# Patient Record
Sex: FEMALE | Race: WHITE | NOT HISPANIC OR LATINO | Employment: OTHER | URBAN - METROPOLITAN AREA
[De-identification: names, ages, dates, MRNs, and addresses within clinical notes are randomized per-mention and may not be internally consistent; named-entity substitution may affect disease eponyms.]

---

## 2017-01-06 ENCOUNTER — TRANSCRIBE ORDERS (OUTPATIENT)
Dept: ADMINISTRATIVE | Facility: HOSPITAL | Age: 77
End: 2017-01-06

## 2017-01-06 DIAGNOSIS — E04.1 NONTOXIC UNINODULAR GOITER: Primary | ICD-10-CM

## 2017-01-09 ENCOUNTER — ALLSCRIPTS OFFICE VISIT (OUTPATIENT)
Dept: OTHER | Facility: OTHER | Age: 77
End: 2017-01-09

## 2017-01-16 ENCOUNTER — ALLSCRIPTS OFFICE VISIT (OUTPATIENT)
Dept: OTHER | Facility: OTHER | Age: 77
End: 2017-01-16

## 2017-01-20 ENCOUNTER — ALLSCRIPTS OFFICE VISIT (OUTPATIENT)
Dept: OTHER | Facility: OTHER | Age: 77
End: 2017-01-20

## 2017-01-26 ENCOUNTER — HOSPITAL ENCOUNTER (OUTPATIENT)
Dept: RADIOLOGY | Facility: HOSPITAL | Age: 77
Discharge: HOME | End: 2017-01-26
Attending: SURGERY
Payer: MEDICARE

## 2017-01-26 DIAGNOSIS — E04.1 NONTOXIC UNINODULAR GOITER: ICD-10-CM

## 2017-01-26 PROCEDURE — 76536 US EXAM OF HEAD AND NECK: CPT

## 2017-02-14 ENCOUNTER — ALLSCRIPTS OFFICE VISIT (OUTPATIENT)
Dept: OTHER | Facility: OTHER | Age: 77
End: 2017-02-14

## 2017-04-10 ENCOUNTER — ALLSCRIPTS OFFICE VISIT (OUTPATIENT)
Dept: OTHER | Facility: OTHER | Age: 77
End: 2017-04-10

## 2017-05-15 ENCOUNTER — APPOINTMENT (EMERGENCY)
Dept: RADIOLOGY | Facility: HOSPITAL | Age: 77
End: 2017-05-15
Payer: MEDICARE

## 2017-05-15 ENCOUNTER — HOSPITAL ENCOUNTER (EMERGENCY)
Facility: HOSPITAL | Age: 77
Discharge: HOME | End: 2017-05-15
Attending: EMERGENCY MEDICINE | Admitting: EMERGENCY MEDICINE
Payer: MEDICARE

## 2017-05-15 VITALS
HEART RATE: 66 BPM | SYSTOLIC BLOOD PRESSURE: 169 MMHG | WEIGHT: 160 LBS | BODY MASS INDEX: 31.25 KG/M2 | OXYGEN SATURATION: 100 % | TEMPERATURE: 97.1 F | RESPIRATION RATE: 20 BRPM | DIASTOLIC BLOOD PRESSURE: 82 MMHG

## 2017-05-15 DIAGNOSIS — R10.9 ABDOMINAL PAIN: Primary | ICD-10-CM

## 2017-05-15 LAB
ALBUMIN SERPL BCP-MCNC: 3.8 G/DL (ref 3.5–5)
ALP SERPL-CCNC: 63 U/L (ref 46–116)
ALT SERPL W P-5'-P-CCNC: 19 U/L (ref 12–78)
ANION GAP SERPL CALCULATED.3IONS-SCNC: 11 MMOL/L (ref 4–13)
AST SERPL W P-5'-P-CCNC: 24 U/L (ref 5–45)
BASOPHILS # BLD AUTO: 0 THOUSANDS/ΜL (ref 0–0.1)
BASOPHILS NFR BLD AUTO: 1 % (ref 0–1)
BILIRUB SERPL-MCNC: 0.5 MG/DL (ref 0.2–1)
BILIRUB UR QL STRIP: NEGATIVE
BUN SERPL-MCNC: 12 MG/DL (ref 5–25)
CALCIUM SERPL-MCNC: 9.7 MG/DL (ref 8.3–10.1)
CHLORIDE SERPL-SCNC: 103 MMOL/L (ref 100–108)
CLARITY UR: CLEAR
CO2 SERPL-SCNC: 27 MMOL/L (ref 21–32)
COLOR UR: YELLOW
CREAT SERPL-MCNC: 0.71 MG/DL (ref 0.6–1.3)
EOSINOPHIL # BLD AUTO: 0.4 THOUSAND/ΜL (ref 0–0.61)
EOSINOPHIL NFR BLD AUTO: 8 % (ref 0–6)
ERYTHROCYTE [DISTWIDTH] IN BLOOD BY AUTOMATED COUNT: 16 % (ref 11.6–15.1)
EST. AVERAGE GLUCOSE BLD GHB EST-MCNC: 111 MG/DL
GFR SERPL CREATININE-BSD FRML MDRD: >60 ML/MIN/1.73SQ M
GLUCOSE SERPL-MCNC: 97 MG/DL (ref 65–140)
GLUCOSE UR STRIP-MCNC: NEGATIVE MG/DL
HBA1C MFR BLD: 5.5 % (ref 4.2–6.3)
HCT VFR BLD AUTO: 31.6 % (ref 37–47)
HGB BLD-MCNC: 10.2 G/DL (ref 12–16)
HGB UR QL STRIP.AUTO: NEGATIVE
KETONES UR STRIP-MCNC: NEGATIVE MG/DL
LACTATE SERPL-SCNC: 1 MMOL/L (ref 0.5–2)
LEUKOCYTE ESTERASE UR QL STRIP: NEGATIVE
LIPASE SERPL-CCNC: 65 U/L (ref 73–393)
LYMPHOCYTES # BLD AUTO: 0.7 THOUSANDS/ΜL (ref 0.6–4.47)
LYMPHOCYTES NFR BLD AUTO: 15 % (ref 14–44)
MCH RBC QN AUTO: 26.5 PG (ref 27–31)
MCHC RBC AUTO-ENTMCNC: 32.2 G/DL (ref 31.4–37.4)
MCV RBC AUTO: 82 FL (ref 82–98)
MONOCYTES # BLD AUTO: 0.6 THOUSAND/ΜL (ref 0.17–1.22)
MONOCYTES NFR BLD AUTO: 13 % (ref 4–12)
NEUTROPHILS # BLD AUTO: 3 THOUSANDS/ΜL (ref 1.85–7.62)
NEUTS SEG NFR BLD AUTO: 63 % (ref 43–75)
NITRITE UR QL STRIP: NEGATIVE
NRBC BLD AUTO-RTO: 0 /100 WBCS
PH UR STRIP.AUTO: 6.5 [PH] (ref 5–9)
PLATELET # BLD AUTO: 232 THOUSANDS/UL (ref 130–400)
PMV BLD AUTO: 7.9 FL (ref 8.9–12.7)
POTASSIUM SERPL-SCNC: 4.2 MMOL/L (ref 3.5–5.3)
PROT SERPL-MCNC: 7.5 G/DL (ref 6.4–8.2)
PROT UR STRIP-MCNC: NEGATIVE MG/DL
RBC # BLD AUTO: 3.84 MILLION/UL (ref 4.2–5.4)
SODIUM SERPL-SCNC: 141 MMOL/L (ref 136–145)
SP GR UR STRIP.AUTO: 1.01 (ref 1–1.03)
T3 SERPL-MCNC: 1.1 NG/ML (ref 0.6–1.8)
T4 FREE SERPL-MCNC: 1.17 NG/DL (ref 0.76–1.46)
TSH SERPL DL<=0.05 MIU/L-ACNC: 2.11 UIU/ML (ref 0.36–3.74)
UROBILINOGEN UR QL STRIP.AUTO: 0.2 E.U./DL
WBC # BLD AUTO: 4.9 THOUSAND/UL (ref 4.8–10.8)

## 2017-05-15 PROCEDURE — 84439 ASSAY OF FREE THYROXINE: CPT | Performed by: EMERGENCY MEDICINE

## 2017-05-15 PROCEDURE — 83690 ASSAY OF LIPASE: CPT | Performed by: EMERGENCY MEDICINE

## 2017-05-15 PROCEDURE — 87040 BLOOD CULTURE FOR BACTERIA: CPT | Performed by: EMERGENCY MEDICINE

## 2017-05-15 PROCEDURE — 85025 COMPLETE CBC W/AUTO DIFF WBC: CPT | Performed by: EMERGENCY MEDICINE

## 2017-05-15 PROCEDURE — 84480 ASSAY TRIIODOTHYRONINE (T3): CPT | Performed by: EMERGENCY MEDICINE

## 2017-05-15 PROCEDURE — 74177 CT ABD & PELVIS W/CONTRAST: CPT

## 2017-05-15 PROCEDURE — 36415 COLL VENOUS BLD VENIPUNCTURE: CPT | Performed by: EMERGENCY MEDICINE

## 2017-05-15 PROCEDURE — 80053 COMPREHEN METABOLIC PANEL: CPT | Performed by: EMERGENCY MEDICINE

## 2017-05-15 PROCEDURE — 84443 ASSAY THYROID STIM HORMONE: CPT | Performed by: EMERGENCY MEDICINE

## 2017-05-15 PROCEDURE — 83036 HEMOGLOBIN GLYCOSYLATED A1C: CPT | Performed by: EMERGENCY MEDICINE

## 2017-05-15 PROCEDURE — 83605 ASSAY OF LACTIC ACID: CPT | Performed by: EMERGENCY MEDICINE

## 2017-05-15 PROCEDURE — 93005 ELECTROCARDIOGRAM TRACING: CPT | Performed by: EMERGENCY MEDICINE

## 2017-05-15 PROCEDURE — 81003 URINALYSIS AUTO W/O SCOPE: CPT | Performed by: EMERGENCY MEDICINE

## 2017-05-15 PROCEDURE — 99284 EMERGENCY DEPT VISIT MOD MDM: CPT

## 2017-05-15 RX ADMIN — IOHEXOL 100 ML: 350 INJECTION, SOLUTION INTRAVENOUS at 14:59

## 2017-05-16 LAB
ATRIAL RATE: 57 BPM
P AXIS: -1 DEGREES
PR INTERVAL: 168 MS
QRS AXIS: -5 DEGREES
QRSD INTERVAL: 158 MS
QT INTERVAL: 514 MS
QTC INTERVAL: 500 MS
T WAVE AXIS: 9 DEGREES
VENTRICULAR RATE: 57 BPM

## 2017-05-17 ENCOUNTER — ALLSCRIPTS OFFICE VISIT (OUTPATIENT)
Dept: OTHER | Facility: OTHER | Age: 77
End: 2017-05-17

## 2017-05-20 LAB
BACTERIA BLD CULT: NORMAL
BACTERIA BLD CULT: NORMAL

## 2017-05-22 ENCOUNTER — ALLSCRIPTS OFFICE VISIT (OUTPATIENT)
Dept: OTHER | Facility: OTHER | Age: 77
End: 2017-05-22

## 2017-06-14 ENCOUNTER — ALLSCRIPTS OFFICE VISIT (OUTPATIENT)
Dept: OTHER | Facility: OTHER | Age: 77
End: 2017-06-14

## 2017-06-14 DIAGNOSIS — E61.2 MAGNESIUM DEFICIENCY: ICD-10-CM

## 2017-06-14 DIAGNOSIS — E55.9 VITAMIN D DEFICIENCY: ICD-10-CM

## 2017-06-26 ENCOUNTER — TRANSCRIBE ORDERS (OUTPATIENT)
Dept: ADMINISTRATIVE | Facility: HOSPITAL | Age: 77
End: 2017-06-26

## 2017-06-26 ENCOUNTER — HOSPITAL ENCOUNTER (OUTPATIENT)
Dept: RADIOLOGY | Facility: HOSPITAL | Age: 77
Discharge: HOME | End: 2017-06-26
Attending: SURGERY
Payer: MEDICARE

## 2017-06-26 ENCOUNTER — APPOINTMENT (OUTPATIENT)
Dept: LAB | Facility: HOSPITAL | Age: 77
End: 2017-06-26
Attending: SURGERY
Payer: MEDICARE

## 2017-06-26 DIAGNOSIS — C50.912 MALIGNANT NEOPLASM OF LEFT FEMALE BREAST, UNSPECIFIED SITE OF BREAST: ICD-10-CM

## 2017-06-26 DIAGNOSIS — E55.9 UNSPECIFIED VITAMIN D DEFICIENCY: ICD-10-CM

## 2017-06-26 DIAGNOSIS — C50.912 MALIGNANT NEOPLASM OF LEFT FEMALE BREAST, UNSPECIFIED SITE OF BREAST: Primary | ICD-10-CM

## 2017-06-26 DIAGNOSIS — E61.2 MAGNESIUM DEFICIENCY: ICD-10-CM

## 2017-06-26 DIAGNOSIS — E61.2 MAGNESIUM DEFICIENCY: Primary | ICD-10-CM

## 2017-06-26 PROCEDURE — 71020 HB CHEST X-RAY 2VW FRONTAL&LATL: CPT

## 2017-06-26 PROCEDURE — 82306 VITAMIN D 25 HYDROXY: CPT | Performed by: FAMILY MEDICINE

## 2017-06-26 PROCEDURE — 86300 IMMUNOASSAY TUMOR CA 15-3: CPT

## 2017-06-26 PROCEDURE — 36415 COLL VENOUS BLD VENIPUNCTURE: CPT | Performed by: FAMILY MEDICINE

## 2017-06-26 PROCEDURE — 83735 ASSAY OF MAGNESIUM: CPT | Performed by: FAMILY MEDICINE

## 2017-06-27 LAB — CANCER AG27-29 SERPL-ACNC: 21.9 U/ML (ref 0–42.3)

## 2017-06-30 ENCOUNTER — ALLSCRIPTS OFFICE VISIT (OUTPATIENT)
Dept: OTHER | Facility: OTHER | Age: 77
End: 2017-06-30

## 2017-07-10 DIAGNOSIS — J30.9 ALLERGIC RHINITIS: ICD-10-CM

## 2017-07-10 DIAGNOSIS — E78.5 HYPERLIPIDEMIA: ICD-10-CM

## 2017-07-12 ENCOUNTER — ALLSCRIPTS OFFICE VISIT (OUTPATIENT)
Dept: OTHER | Facility: OTHER | Age: 77
End: 2017-07-12

## 2017-08-15 ENCOUNTER — GENERIC CONVERSION - ENCOUNTER (OUTPATIENT)
Dept: OTHER | Facility: OTHER | Age: 77
End: 2017-08-15

## 2017-08-15 ENCOUNTER — ALLSCRIPTS OFFICE VISIT (OUTPATIENT)
Dept: OTHER | Facility: OTHER | Age: 77
End: 2017-08-15

## 2017-08-15 DIAGNOSIS — E03.9 HYPOTHYROIDISM: ICD-10-CM

## 2017-08-15 DIAGNOSIS — E78.5 HYPERLIPIDEMIA: ICD-10-CM

## 2017-08-15 DIAGNOSIS — D64.9 ANEMIA: ICD-10-CM

## 2017-08-16 ENCOUNTER — APPOINTMENT (OUTPATIENT)
Dept: LAB | Facility: CLINIC | Age: 77
End: 2017-08-16
Payer: MEDICARE

## 2017-08-16 ENCOUNTER — TRANSCRIBE ORDERS (OUTPATIENT)
Dept: LAB | Facility: CLINIC | Age: 77
End: 2017-08-16

## 2017-08-16 DIAGNOSIS — E78.5 HYPERLIPIDEMIA: ICD-10-CM

## 2017-08-16 DIAGNOSIS — E03.9 HYPOTHYROIDISM: ICD-10-CM

## 2017-08-16 DIAGNOSIS — D64.9 ANEMIA: ICD-10-CM

## 2017-08-16 LAB
BASOPHILS # BLD AUTO: 0.02 THOUSANDS/ΜL (ref 0–0.1)
BASOPHILS NFR BLD AUTO: 0 % (ref 0–1)
CHOLEST SERPL-MCNC: 205 MG/DL (ref 50–200)
EOSINOPHIL # BLD AUTO: 0.14 THOUSAND/ΜL (ref 0–0.61)
EOSINOPHIL NFR BLD AUTO: 3 % (ref 0–6)
ERYTHROCYTE [DISTWIDTH] IN BLOOD BY AUTOMATED COUNT: 15.8 % (ref 11.6–15.1)
HCT VFR BLD AUTO: 32.1 % (ref 34.8–46.1)
HDLC SERPL-MCNC: 43 MG/DL (ref 40–60)
HGB BLD-MCNC: 10.2 G/DL (ref 11.5–15.4)
LDLC SERPL CALC-MCNC: 134 MG/DL (ref 0–100)
LYMPHOCYTES # BLD AUTO: 1.08 THOUSANDS/ΜL (ref 0.6–4.47)
LYMPHOCYTES NFR BLD AUTO: 21 % (ref 14–44)
MCH RBC QN AUTO: 26.1 PG (ref 26.8–34.3)
MCHC RBC AUTO-ENTMCNC: 31.8 G/DL (ref 31.4–37.4)
MCV RBC AUTO: 82 FL (ref 82–98)
MONOCYTES # BLD AUTO: 0.65 THOUSAND/ΜL (ref 0.17–1.22)
MONOCYTES NFR BLD AUTO: 13 % (ref 4–12)
NEUTROPHILS # BLD AUTO: 3.23 THOUSANDS/ΜL (ref 1.85–7.62)
NEUTS SEG NFR BLD AUTO: 63 % (ref 43–75)
NRBC BLD AUTO-RTO: 0 /100 WBCS
PLATELET # BLD AUTO: 243 THOUSANDS/UL (ref 149–390)
PMV BLD AUTO: 11.1 FL (ref 8.9–12.7)
RBC # BLD AUTO: 3.91 MILLION/UL (ref 3.81–5.12)
TRIGL SERPL-MCNC: 141 MG/DL
TSH SERPL DL<=0.05 MIU/L-ACNC: 1.52 UIU/ML (ref 0.36–3.74)
WBC # BLD AUTO: 5.13 THOUSAND/UL (ref 4.31–10.16)

## 2017-08-16 PROCEDURE — 80061 LIPID PANEL: CPT

## 2017-08-16 PROCEDURE — 36415 COLL VENOUS BLD VENIPUNCTURE: CPT

## 2017-08-16 PROCEDURE — 84443 ASSAY THYROID STIM HORMONE: CPT

## 2017-08-16 PROCEDURE — 85025 COMPLETE CBC W/AUTO DIFF WBC: CPT

## 2017-08-23 ENCOUNTER — GENERIC CONVERSION - ENCOUNTER (OUTPATIENT)
Dept: OTHER | Facility: OTHER | Age: 77
End: 2017-08-23

## 2017-09-07 ENCOUNTER — TRANSCRIBE ORDERS (OUTPATIENT)
Dept: LAB | Facility: CLINIC | Age: 77
End: 2017-09-07

## 2017-09-07 ENCOUNTER — APPOINTMENT (OUTPATIENT)
Dept: LAB | Facility: CLINIC | Age: 77
End: 2017-09-07
Payer: MEDICARE

## 2017-09-07 DIAGNOSIS — R10.32 ABDOMINAL PAIN, LEFT LOWER QUADRANT: Primary | ICD-10-CM

## 2017-09-07 PROCEDURE — 83993 ASSAY FOR CALPROTECTIN FECAL: CPT

## 2017-09-13 ENCOUNTER — GENERIC CONVERSION - ENCOUNTER (OUTPATIENT)
Dept: OTHER | Facility: OTHER | Age: 77
End: 2017-09-13

## 2017-09-14 LAB — CALPROTECTIN STL-MCNT: 52 UG/G (ref 0–120)

## 2017-09-25 ENCOUNTER — ALLSCRIPTS OFFICE VISIT (OUTPATIENT)
Dept: OTHER | Facility: OTHER | Age: 77
End: 2017-09-25

## 2017-10-27 NOTE — PROGRESS NOTES
Assessment  1  Nasal congestion (478 19) (R09 81)   2  Itchy eyes (379 99) (H57 8)   3  Fatigue (780 79) (R53 83)   4  Ear pain, left (388 70) (H92 02)   5  Eye pain (379 91) (H57 10)   6  Allergic rhinitis (477 9) (J30 9)    Plan  Myalgia, Nasal congestion    · MethylPREDNISolone Acetate 40 MG/ML Injection Suspension  (DEPO-Medrol)    Discussion/Summary    Christina Costa is a 68year old female who presents for eye itching/pain, ear itching/pain, and sinus congestion  Allergic rhinitis & conjunctivitis - allergic etiology is the likely cause of the patients symptoms, despite the fact that predominance of symptoms on the left makes it less likely  she has not experienced fevers or chills, and she has had no other signs/symptoms of URI  her symptoms have been ongoing for months, have not worsened or resolved, and are associated with itchiness  the patient also has a history of allergies  furthermore, the patient is a poor candidate for oral antibiotics, as she has severe GI side effects when taking them  patient was given a 40 mg injection of methylprednisolone in office  She is to continue drinking plenty of water to ensure hydration, to continue the rest of her prescribed medications including flonase, singulair, and her eyedrops  she will monitor her symptoms for resolution and call back if there is no improvement, or worsening  she is to follow up in 2 weeks otherwise  dr Lyric Lees  The patient was counseled regarding instructions for management,-- prognosis,-- patient and family education,-- impressions,-- risks and benefits of treatment options  Possible side effects of new medications were reviewed with the patient/guardian today  The treatment plan was reviewed with the patient/guardian  The patient/guardian understands and agrees with the treatment plan      Chief Complaint  patient c/o sinus pressure   MM      History of Present Illness  HPI: Christina Costa is a 68year old female who presents for a followup for primarily left sided nasal sinus congestion/pain, eye pain/itching, and ear pain/itching  states she has been experiencing these symptoms, along with generalized weakness and fatigue, for months now  The symptoms occur primarily on the left side, however, she notes that occasionally she does experience symptoms on the right side as well  She states her eyes are dry, itchy, and painful  She states that she feels a fullness/congestion in her nose, primarily on the left side  She states she has a history of a deviated nasal septum as well  She also complains of a feeling of fullness in her left ear, which is sometimes painful and other times like a deep itch which she cannot alleviate  She states she has minimal rhinorrhea, but her congestion is her primary concern  She states she does experience allergies year-round, in the spring/summer as well as the winter  She has felt chills at home, but has had no subjective or objective fever  She denies any lower respiratory signs  She states she does not sleep well at night normally, so her disrupted sleep is likely not related to her current symptomology  came to see Dr Chema Gerardo on sep 13 and was prescribed ketorolac eye drops which she states only burn and do not help her feel better  She has been taking her flonase, which she states helps minimally  She does not notice whether or not her montelukast helps her at all    states she otherwise feels okay  She has been seeing Dr Sebastian Gaxiola for depression and anxiety  He recently changed her medications and she is sleeping better now    has a history of colectomy and cannot take oral antibiotics or steroids without experiencing ill effects to her GI system  Review of Systems    Constitutional: feeling poorly,-- chills-- and-- feeling tired, but-- no fever  ENT: earache-- and-- nasal discharge, but-- no nosebleeds,-- no sore throat,-- no hearing loss-- and-- no hoarseness     Cardiovascular: no complaints of slow or fast heart rate, no chest pain, no palpitations, no leg claudication or lower extremity edema  Respiratory: cough, but-- no shortness of breath,-- no orthopnea,-- no wheezing,-- no shortness of breath during exertion-- and-- no PND  Breasts: no complaints of breast pain, breast lump or nipple discharge  Gastrointestinal: no complaints of abdominal pain, no constipation, no nausea or diarrhea, no vomiting, no bloody stools  Genitourinary: no complaints of dysuria, no incontinence, no pelvic pain, no dysmenorrhea, no vaginal discharge or abnormal vaginal bleeding  Musculoskeletal: no complaints of arthralgia, no myalgia, no joint swelling or stiffness, no limb pain or swelling  Integumentary: no complaints of skin rash or lesion, no itching or dry skin, no skin wounds  Neurological: headache, but-- no numbness,-- no tingling,-- no confusion,-- no dizziness-- and-- no fainting  ROS reviewed  Active Problems  1  Abdominal pain, chronic, left lower quadrant (375 10,433 67) (R10 32,G89 29)   2  Advance directive on file (V49 89) (Z78 9)   3  Allergic rhinitis (477 9) (J30 9)   4  Anemia (285 9) (D64 9)   5  Anxiety disorder (300 00) (F41 9)   6  Breast cancer (174 9) (C50 919)   7  Chronic allergic conjunctivitis (372 14) (H10 45)   8  Depression (311) (F32 9)   9  Esophageal reflux (530 81) (K21 9)   10  Fatigue (780 79) (R53 83)   11  Feared complaint without diagnosis (V65 5) (Z71 1)   12  Fecal impaction (560 32) (K56 41)   13  Fibromyalgia (729 1) (M79 7)   14  Hyperlipidemia (272 4) (E78 5)   15  Hypertension (401 9) (I10)   16  Hypothyroidism (244 9) (E03 9)   17  Insomnia (780 52) (G47 00)   18  Left inguinal hernia (550 90) (K40 90)   19  Lumbar radiculopathy (724 4) (M54 16)   20  Magnesium deficiency (275 2) (E61 2)   21  Myalgia (729 1) (M79 1)   22  Need for Tdap vaccination (V06 1) (Z23)   23   Need for vaccination with 13-polyvalent pneumococcal conjugate vaccine (V03 82) (Z23) 24  Obstructive sleep apnea (327 23) (G47 33)   25  Vitamin D deficiency (268 9) (E55 9)   26  Welcome to Medicare preventive visit (V70 0) (Z00 00)    Past Medical History  1  History of Acute diverticulitis (562 11) (K57 92)   2  History of Allergic conjunctivitis (372 14) (H10 10)   3  History of Anxiety disorder (300 00) (F41 9)   4  History of Benign Polyps Of The Large Intestine (V12 72)   5  History of Breast Cancer (V10 3)   6  History of Cat-scratch disease (078 3) (A28 1)   7  History of Depression (311) (F32 9)   8  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)   9  History of Fibromyalgia (729 1) (M79 7)   10  History of Genital warts (078 11) (A63 0)   11  History of Herniated lumbar intervertebral disc (722 10) (M51 26)   12  History of allergic rhinitis (V12 69) (Z87 09)   13  History of allergic rhinitis (V12 69) (Z87 09)   14  History of constipation (V12 79) (Z87 19)   15  History of dehydration (V12 29) (Z86 39)   16  History of diverticulitis of colon (V12 79) (Z87 19)   17  History of hemorrhoids (V13 89) (Z87 19)   18  History of hypertension (V12 59) (Z86 79)   19  History of urinary frequency (V13 09) (Z87 898)   20  History of urinary tract infection (V13 02) (Z87 440)   21  History of Irritable bowel syndrome (564 1) (K58 9)   22  History of Lyme disease (088 81) (A69 20)   23  History of Lymphedema (457 1) (I89 0)   24  History of Muscle spasms of neck (728 85) (M62 838)   25  History of Muscle strain of chest wall (848 8) (S29 011A)   26  History of Rectocele (618 04) (N81 6)    Family History  Mother    1  No pertinent family history  Father    2  Family history of Emphysema  Sister    3  Family history of Breast Cancer (V16 3)   4  Family history of Carcinoma Of The Pancreas   5  Family history of Lung Cancer (V16 1)  Brother    6  Family history of Lung Cancer (V16 1)  Family History    7  Denied: Family history of Adverse Reaction To Anesthesia   8   Denied: Family history of Benign Polyps Of The Large Intestine   9  Denied: Family history of Bleeding   10  Denied: Family history of Crohn's Disease   11  Denied: Family history of Ulcerative Colitis    Social History   · Advance directive on file (F52 18) (Z78 9)   · Denied: History of Alcohol Use (History)   · Denied: History of Drug Use   · Marital History - Currently    · Marital History - Single   · Never a smoker   · Non-smoker (V49 89) (Z78 9)   · Occupation: Retired  The social history was reviewed and updated today  Surgical History  1  History of Breast Surgery Mastectomy   2  History of  Section   3  History of Cholecystectomy   4  History of Complete Colonoscopy   5  History of Hysterectomy   6  History of Incisional Hernia Repair With Implantation Of Mesh   7  History of Partial Colectomy   8  History of Partial Colectomy, End Colostomy & Distal Segment Closure  Surgical History Reviewed: The surgical history was reviewed and updated today  Current Meds   1  Align Oral Capsule; Therapy: (Recorded:86Gpx1115) to Recorded   2  ALPRAZolam 0 5 MG Oral Tablet; one tab tid prn; Therapy: 82RPG0207 to (Last Rx:54Nhd3510) Ordered   3  Dexilant 60 MG Oral Capsule Delayed Release; TAKE 1 CAPSULE DAILY EVERY   MORNING BEFORE BREAKFAST; Therapy: 39Mya5863 to (Prudence Fair)  Requested for: 61Aji6385; Last   Rx:55Zhc3109 Ordered   4  Dicyclomine HCl - 10 MG Oral Capsule; TAKE 1 CAPSULE 3 TIMES DAILY; Therapy: 48DPL5509 to (Evaluate:91Byf4264)  Requested for: 13LZU8001; Last   Rx:49Ypk7520 Ordered   5  DULoxetine HCl - 30 MG Oral Capsule Delayed Release Particles; TAKE 1 CAPSULE   DAILY  Requested for: 68KMO5559; Last Rx:13Nvq0936 Ordered   6  Fluticasone Propionate 50 MCG/ACT Nasal Suspension; USE 1 SPRAY IN EACH   NOSTRIL DAILY; Therapy: 86Mvf9804 to (Last Rx:97Jaa5217)  Requested for: 74Oit8710 Ordered   7   Ketorolac Tromethamine 0 5 % Ophthalmic Solution; INSTILL 1 DROP INTO AFFECTED   EYE(S) 4 TIMES DAILY; Therapy: 22Wxo5955 to (Last Rx:11Pql9808)  Requested for: 48Etx8327 Ordered   8  Levothyroxine Sodium 25 MCG Oral Tablet; take 1 tablet by mouth once daily; Therapy: 84VUD8249 to (Last Rx:99Qyt9181) Ordered   9  Montelukast Sodium 10 MG Oral Tablet; TAKE 1 TABLET DAILY; Therapy: 22OJP2118 to (Evaluate:13Nov2017)  Requested for: 44ZCF7424; Last   Rx:02Cjp5380 Ordered   10  Olopatadine HCl - 0 2 % Ophthalmic Solution; INSTILL 1 DROP  INTO AFFECTED    EYE(S) ONCE DAILY AS DIRECTED  Requested for: 20Hcp5902; Last Rx:49Bbr4633    Ordered   11  Valsartan 80 MG Oral Tablet; TAKE 1 TABLET DAILY; Therapy: 61Nfj8909 to (Evaluate:11Axh7366)  Requested for: 79Tyt3316; Last    Rx:69Jxf9848 Ordered   12  Vicodin ES 7 5-300 MG Oral Tablet; one to two tabs q4 -q6 hours prn pain for inguinal    hernia; Therapy: 02AWQ0818 to (Last Rx:19Fds8815) Ordered    The medication list was reviewed and updated today  Allergies  1  Duricef TABS   2  Sulfa Drugs  3  Latex    Vitals   Recorded: 57NAA9363 05:00PM   Temperature 97 6 F   Heart Rate 105   Respiration 18   Systolic 437   Diastolic 80   Height 5 ft    Weight 152 lb    BMI Calculated 29 69   BSA Calculated 1 66   O2 Saturation 97     Physical Exam    Constitutional   General appearance: No acute distress, well appearing and well nourished  Eyes   Conjunctiva and lids: No swelling, erythema or discharge  Pupils and irises: Equal, round and reactive to light  Ears, Nose, Mouth, and Throat   External inspection of ears and nose: Normal     Otoscopic examination: Abnormal   The right tympanic membrane was normal  The left tympanic membrane was normal  The right external canal was tender, but-- was normal,-- was not swollen,-- was not erythematous,-- did not have a cerumen impaction,-- did not have a discharge-- and-- did not have a foreign body   The left external canal was tender, but-- was not swollen,-- was not erythematous,-- did not have a cerumen impaction,-- did not have a discharge-- and-- did not have a foreign body  Left ear canal dark red crusting / scabbing  Exam of the right middle ear showed no hemotympanum-- and-- no middle ear effusion  Exam of the left middle ear showed no hemotympanum-- and-- no middle ear effusion  Nasal mucosa, septum, and turbinates: Normal without edema or erythema  Oropharynx: Normal with no erythema, edema, exudate or lesions  Pulmonary   Respiratory effort: No increased work of breathing or signs of respiratory distress  Auscultation of lungs: Clear to auscultation  Cardiovascular   Palpation of heart: Normal PMI, no thrills  Auscultation of heart: Normal rate and rhythm, normal S1 and S2, without murmurs  Examination of extremities for edema and/or varicosities: Normal     Lymphatic   Palpation of lymph nodes in neck: No lymphadenopathy  Skin   Skin and subcutaneous tissue: Normal without rashes or lesions  Psychiatric   Orientation to person, place, and time: Normal     Mood and affect: Normal          Attending Note  Attending Note [facility]: Attending Note: I discussed the case with the Resident and reviewed the Resident's note-- and-- I agree with the Resident management plan as it was presented to me  Level of Participation: I was present in clinic, but did not examine the patient  Future Appointments    Date/Time Provider Specialty Site   11/08/2017 06:30 PM NADIA Covarrubias  Family Medicine [facility]   10/13/2017 11:00 AM NADIA Thakkar  [address] [place] [address]   02/14/2018 03:00 PM Roxi Ellison MD Hematology [facility]cology [place]     Signatures   Electronically signed by : Brody Waller DO; Sep 25 2017  7:44PM EST                       (Author)    Electronically signed by :  NADIA Barton ; Sep 26 2017  2:32PM EST                       (Author)

## 2017-12-22 ENCOUNTER — GENERIC CONVERSION - ENCOUNTER (OUTPATIENT)
Dept: FAMILY MEDICINE CLINIC | Facility: CLINIC | Age: 77
End: 2017-12-22

## 2017-12-26 ENCOUNTER — ALLSCRIPTS OFFICE VISIT (OUTPATIENT)
Dept: OTHER | Facility: OTHER | Age: 77
End: 2017-12-26

## 2017-12-26 ENCOUNTER — GENERIC CONVERSION - ENCOUNTER (OUTPATIENT)
Dept: OTHER | Facility: OTHER | Age: 77
End: 2017-12-26

## 2017-12-26 DIAGNOSIS — E83.42 HYPOMAGNESEMIA: ICD-10-CM

## 2017-12-26 DIAGNOSIS — E55.9 VITAMIN D DEFICIENCY: ICD-10-CM

## 2017-12-26 DIAGNOSIS — D64.9 ANEMIA: ICD-10-CM

## 2017-12-26 DIAGNOSIS — E03.9 HYPOTHYROIDISM: ICD-10-CM

## 2017-12-26 DIAGNOSIS — I10 ESSENTIAL (PRIMARY) HYPERTENSION: ICD-10-CM

## 2017-12-27 ENCOUNTER — APPOINTMENT (OUTPATIENT)
Dept: LAB | Facility: CLINIC | Age: 77
End: 2017-12-27
Payer: MEDICARE

## 2017-12-27 DIAGNOSIS — E03.9 HYPOTHYROIDISM: ICD-10-CM

## 2017-12-27 DIAGNOSIS — J30.9 ALLERGIC RHINITIS: ICD-10-CM

## 2017-12-27 DIAGNOSIS — E83.42 HYPOMAGNESEMIA: ICD-10-CM

## 2017-12-27 DIAGNOSIS — E78.5 HYPERLIPIDEMIA: ICD-10-CM

## 2017-12-27 DIAGNOSIS — E55.9 VITAMIN D DEFICIENCY: ICD-10-CM

## 2017-12-27 DIAGNOSIS — D64.9 ANEMIA: ICD-10-CM

## 2017-12-27 DIAGNOSIS — I10 ESSENTIAL (PRIMARY) HYPERTENSION: ICD-10-CM

## 2017-12-27 LAB
25(OH)D3 SERPL-MCNC: 23.7 NG/ML (ref 30–100)
ALBUMIN SERPL BCP-MCNC: 3.5 G/DL (ref 3.5–5)
ALP SERPL-CCNC: 70 U/L (ref 46–116)
ALT SERPL W P-5'-P-CCNC: 14 U/L (ref 12–78)
ANION GAP SERPL CALCULATED.3IONS-SCNC: 4 MMOL/L (ref 4–13)
AST SERPL W P-5'-P-CCNC: 18 U/L (ref 5–45)
BILIRUB SERPL-MCNC: 0.4 MG/DL (ref 0.2–1)
BUN SERPL-MCNC: 13 MG/DL (ref 5–25)
CALCIUM SERPL-MCNC: 9.1 MG/DL (ref 8.3–10.1)
CHLORIDE SERPL-SCNC: 104 MMOL/L (ref 100–108)
CHOLEST SERPL-MCNC: 183 MG/DL (ref 50–200)
CO2 SERPL-SCNC: 29 MMOL/L (ref 21–32)
CREAT SERPL-MCNC: 0.66 MG/DL (ref 0.6–1.3)
ERYTHROCYTE [DISTWIDTH] IN BLOOD BY AUTOMATED COUNT: 15.4 % (ref 11.6–15.1)
GFR SERPL CREATININE-BSD FRML MDRD: 85 ML/MIN/1.73SQ M
GLUCOSE P FAST SERPL-MCNC: 87 MG/DL (ref 65–99)
HCT VFR BLD AUTO: 30.1 % (ref 34.8–46.1)
HDLC SERPL-MCNC: 39 MG/DL (ref 40–60)
HGB BLD-MCNC: 9.1 G/DL (ref 11.5–15.4)
LDLC SERPL CALC-MCNC: 118 MG/DL (ref 0–100)
MAGNESIUM SERPL-MCNC: 2.1 MG/DL (ref 1.6–2.6)
MCH RBC QN AUTO: 26.4 PG (ref 26.8–34.3)
MCHC RBC AUTO-ENTMCNC: 30.2 G/DL (ref 31.4–37.4)
MCV RBC AUTO: 87 FL (ref 82–98)
PLATELET # BLD AUTO: 229 THOUSANDS/UL (ref 149–390)
PMV BLD AUTO: 10.2 FL (ref 8.9–12.7)
POTASSIUM SERPL-SCNC: 4 MMOL/L (ref 3.5–5.3)
PROT SERPL-MCNC: 7.3 G/DL (ref 6.4–8.2)
RBC # BLD AUTO: 3.45 MILLION/UL (ref 3.81–5.12)
SODIUM SERPL-SCNC: 137 MMOL/L (ref 136–145)
TRIGL SERPL-MCNC: 130 MG/DL
TSH SERPL DL<=0.05 MIU/L-ACNC: 1.87 UIU/ML (ref 0.36–3.74)
WBC # BLD AUTO: 3.99 THOUSAND/UL (ref 4.31–10.16)

## 2017-12-27 PROCEDURE — 83735 ASSAY OF MAGNESIUM: CPT

## 2017-12-27 PROCEDURE — 80061 LIPID PANEL: CPT

## 2017-12-27 PROCEDURE — 84443 ASSAY THYROID STIM HORMONE: CPT

## 2017-12-27 PROCEDURE — 36415 COLL VENOUS BLD VENIPUNCTURE: CPT

## 2017-12-27 PROCEDURE — 80053 COMPREHEN METABOLIC PANEL: CPT

## 2017-12-27 PROCEDURE — 85027 COMPLETE CBC AUTOMATED: CPT

## 2017-12-27 PROCEDURE — 82306 VITAMIN D 25 HYDROXY: CPT

## 2018-01-08 ENCOUNTER — GENERIC CONVERSION - ENCOUNTER (OUTPATIENT)
Dept: OTHER | Facility: OTHER | Age: 78
End: 2018-01-08

## 2018-01-09 NOTE — MISCELLANEOUS
Current Meds   1  Align Oral Capsule; Therapy: (Recorded:54Zyx4273) to Recorded   2  ALPRAZolam 0 5 MG Oral Tablet; one tab tid prn; Therapy: 63KPJ2403 to (Last Rx:55Ule2798) Ordered   3  Dexilant 60 MG Oral Capsule Delayed Release; TAKE 1 CAPSULE DAILY EVERY   MORNING BEFORE BREAKFAST; Therapy: 47Iru7884 to [phone]  Requested for: 25Apr2017; Last   Rx:07Bji9780 Ordered   4  Dicyclomine HCl - 10 MG Oral Capsule; TAKE 1 CAPSULE 3 TIMES DAILY; Therapy: 02SAW3190 to (Evaluate:61Jvg3541)  Requested for: 36LHC0318; Last   Rx:68Ntd8443 Ordered   5  DULoxetine HCl - 30 MG Oral Capsule Delayed Release Particles (Cymbalta); TAKE 1   CAPSULE DAILY  Requested for: 20ARM7064; Last Rx:15Mar2017 Ordered   6  Fluticasone Propionate 50 MCG/ACT Nasal Suspension; USE 1 SPRAY IN EACH   NOSTRIL DAILY; Therapy: 97Lfe3485 to (Last Rx:84Cva7226)  Requested for: 36Gyk6300 Ordered   7  Levothyroxine Sodium 25 MCG Oral Tablet (Synthroid); take 1 tablet by mouth once daily; Therapy: 43NJP1047 to (Last Rx:73Nmq3355) Ordered   8  Montelukast Sodium 10 MG Oral Tablet (Singulair); TAKE 1 TABLET DAILY; Therapy: 43QAA1873 to (Evaluate:13Nov2017)  Requested for: 61XBT1409; Last   Rx:89Nid7224 Ordered   9  Pataday 0 2 % Ophthalmic Solution; INSTILL 1 DROP  INTO AFFECTED EYE(S) ONCE   DAILY AS DIRECTED  Requested for: 21Jun2016; Last Rx:21Jun2016 Ordered   10  Valsartan 80 MG Oral Tablet (Diovan); TAKE 1 TABLET DAILY; Therapy: 89Nqy4303 to (Evaluate:92Drn4433)  Requested for: 84Def0587; Last    Rx:73Bsa2623 Ordered   11  Vicodin ES 7 5-300 MG Oral Tablet; one to two tabs q4 -q6 hours prn pain for inguinal    hernia; Therapy: 66OEL7352 to (Last Rx:07Hbk0105) Ordered    Allergies    1  Duricef TABS   2  Sulfa Drugs    3   Latex    Message   Recorded as Task   Date: 08/21/2017 01:39 PM, Created By: Boaz Andrade   Task Name: Care Coordination   Assigned To: RED coventry,team   Regarding Patient: Gurvinder Ramey, Status: Active   CommentCherri Copeland - 21 Aug 2017 1:39 PM     TASK CREATED  Caller: Self; Care Coordination; [phone] (Home)  DR KAUFMAN - PT WANTS YOU TO KNOW THAT DR Demetra Narayanan WILL SEE HER AGAIN  HER BP HAS BEEN RUNNING A LITTLE HIGH  SHE ALSO HAS SHE ALSO HAS THE OPTION GO TO [facility], BUT SHE WOULD RATHER SEE DR Demetra Narayanan  SHE WANTS TO KNOW IF SHE SHOULD CONTINUE HER BP MEDICATION  SHE DIDN'T REALLY WANT TO SPEAK TO A NURSE  Danisha Henry - 23 Aug 2017 10:22 AM     TASK EDITED  Spoke to patient on the phone this am about her BP  She was concerned it was running too low and if she should still take her valsartan 80mg  We have had several low readings in office, and she has reported losing weight as well  I told her to prevent it from going too low to only take half of the pill 40mg, for a while until she sees Dr Zayda Engel in [address]  She should regularly check her BP while out and keep a record of it and bring it to the next appt  Told her to call with any symptoms like dizziness or palpitations  She agreed  Future Appointments    Date/Time Provider Specialty Site   09/13/2017 07:00 PM NADIA Jorge   [facility]   02/14/2018 03:00 PM Edward Peña MD Hematology [facility]     Signatures   Electronically signed by : Joy Trejo DO; Aug 23 2017 10:22AM EST                       (Author)

## 2018-01-10 NOTE — MISCELLANEOUS
Message  Message Free Text Note Form: Patient called the on-call phone at 5:04 PM and call was returned at 5:06 PM  Patient was seen in the office by Dr Walker Nichols today and She was asked to follow up with her psychiatrist Dr Katelyn Wooten whom she has seen in the past for her depression  However patient called to say that Dr Linda Piña office will not accept her as a new patient since she hasn't been seen there in the last 3 months  Patient wanted to know if there are any other options for her to see a psychiatrist  I suggested to patient to follow-up with family guidance and the phone number was provided to her during this phone call  She was also advised to follow up with [facility] and let us know if family guidance is unable to see her within the next month  Patient agrees with the plan and will call us back tomorrow in the office with an update after she contacts family guidance  Denies any suicidal and homicidal ideation at this time        Signatures   Electronically signed by : Marco Leon MD; Aug 15 2017  5:18PM EST                       (Author)    Electronically signed by : RICKY Oneal ; Aug 16 2017 10:08AM EST                       (Author)

## 2018-01-12 VITALS
DIASTOLIC BLOOD PRESSURE: 84 MMHG | TEMPERATURE: 98.7 F | WEIGHT: 161 LBS | SYSTOLIC BLOOD PRESSURE: 150 MMHG | RESPIRATION RATE: 16 BRPM | HEIGHT: 60 IN | BODY MASS INDEX: 31.61 KG/M2 | HEART RATE: 82 BPM

## 2018-01-12 VITALS
WEIGHT: 156 LBS | TEMPERATURE: 98.5 F | SYSTOLIC BLOOD PRESSURE: 118 MMHG | HEART RATE: 80 BPM | OXYGEN SATURATION: 97 % | BODY MASS INDEX: 30.63 KG/M2 | RESPIRATION RATE: 18 BRPM | DIASTOLIC BLOOD PRESSURE: 70 MMHG | HEIGHT: 60 IN

## 2018-01-12 VITALS
RESPIRATION RATE: 18 BRPM | TEMPERATURE: 96.8 F | DIASTOLIC BLOOD PRESSURE: 72 MMHG | BODY MASS INDEX: 30.69 KG/M2 | OXYGEN SATURATION: 97 % | HEIGHT: 60 IN | HEART RATE: 63 BPM | WEIGHT: 156.31 LBS | SYSTOLIC BLOOD PRESSURE: 124 MMHG

## 2018-01-12 NOTE — MISCELLANEOUS
Provider Comments  Provider Comments:   no show, called and patient will like to come when the temperature comes up        Signatures   Electronically signed by : RICKY Meraz ; Jan 9 2017  6:33PM EST                       (Author)

## 2018-01-13 VITALS
DIASTOLIC BLOOD PRESSURE: 88 MMHG | RESPIRATION RATE: 18 BRPM | HEIGHT: 60 IN | HEART RATE: 97 BPM | OXYGEN SATURATION: 98 % | SYSTOLIC BLOOD PRESSURE: 144 MMHG | TEMPERATURE: 97.8 F

## 2018-01-13 VITALS
HEART RATE: 78 BPM | WEIGHT: 152 LBS | RESPIRATION RATE: 18 BRPM | SYSTOLIC BLOOD PRESSURE: 116 MMHG | HEIGHT: 60 IN | BODY MASS INDEX: 29.84 KG/M2 | DIASTOLIC BLOOD PRESSURE: 70 MMHG | OXYGEN SATURATION: 99 % | TEMPERATURE: 97.8 F

## 2018-01-14 VITALS
SYSTOLIC BLOOD PRESSURE: 100 MMHG | HEIGHT: 60 IN | HEART RATE: 86 BPM | WEIGHT: 153 LBS | TEMPERATURE: 97.5 F | BODY MASS INDEX: 30.04 KG/M2 | DIASTOLIC BLOOD PRESSURE: 70 MMHG | RESPIRATION RATE: 16 BRPM

## 2018-01-14 VITALS
RESPIRATION RATE: 18 BRPM | BODY MASS INDEX: 29.84 KG/M2 | OXYGEN SATURATION: 97 % | WEIGHT: 152 LBS | DIASTOLIC BLOOD PRESSURE: 80 MMHG | SYSTOLIC BLOOD PRESSURE: 140 MMHG | HEIGHT: 60 IN | HEART RATE: 105 BPM | TEMPERATURE: 97.6 F

## 2018-01-14 VITALS
DIASTOLIC BLOOD PRESSURE: 72 MMHG | TEMPERATURE: 97.6 F | HEART RATE: 82 BPM | SYSTOLIC BLOOD PRESSURE: 110 MMHG | OXYGEN SATURATION: 98 % | HEIGHT: 60 IN | BODY MASS INDEX: 30.92 KG/M2 | RESPIRATION RATE: 18 BRPM | WEIGHT: 157.5 LBS

## 2018-01-14 VITALS
WEIGHT: 157 LBS | RESPIRATION RATE: 18 BRPM | BODY MASS INDEX: 30.82 KG/M2 | DIASTOLIC BLOOD PRESSURE: 80 MMHG | HEIGHT: 60 IN | TEMPERATURE: 97.5 F | SYSTOLIC BLOOD PRESSURE: 132 MMHG | HEART RATE: 88 BPM

## 2018-01-14 VITALS
RESPIRATION RATE: 18 BRPM | WEIGHT: 161.5 LBS | DIASTOLIC BLOOD PRESSURE: 80 MMHG | HEART RATE: 87 BPM | SYSTOLIC BLOOD PRESSURE: 128 MMHG | OXYGEN SATURATION: 98 % | TEMPERATURE: 99.4 F | BODY MASS INDEX: 31.71 KG/M2 | HEIGHT: 60 IN

## 2018-01-15 VITALS
HEIGHT: 60 IN | HEART RATE: 76 BPM | OXYGEN SATURATION: 96 % | BODY MASS INDEX: 31.22 KG/M2 | TEMPERATURE: 98.7 F | SYSTOLIC BLOOD PRESSURE: 104 MMHG | WEIGHT: 159 LBS | RESPIRATION RATE: 18 BRPM | DIASTOLIC BLOOD PRESSURE: 64 MMHG

## 2018-01-16 NOTE — MISCELLANEOUS
Message   Recorded as Task   Date: 08/01/2016 04:38 PM, Created By: Radha Zarate   Task Name: Medical Complaint Callback   Assigned To: RED coventry,team   Regarding Patient: Calin Huerta, Status: Active   Comment:    Radha Zarate - 01 Aug 2016 4:38 PM     TASK CREATED  Caller: Self; Medical Complaint; [phone] (Home)  DR Casimiro Negron PT,PT STOPPED TAKING CYMBALTA ,SHE HAD DISCUSSED THIS WITH DR Casimiro Negron  THE PAIN IS SEVERE IN HER LEGS ,SHE KNEW THERE WOULD BE PAIN BUT NOT THIS SEVERE    lm on machine      Signatures   Electronically signed by : RICKY Escamilla ; Aug  4 2016 12:50PM EST                       (Author)

## 2018-01-22 VITALS
RESPIRATION RATE: 18 BRPM | BODY MASS INDEX: 29.64 KG/M2 | HEART RATE: 99 BPM | OXYGEN SATURATION: 98 % | TEMPERATURE: 98.5 F | WEIGHT: 151 LBS | HEIGHT: 60 IN | SYSTOLIC BLOOD PRESSURE: 118 MMHG | DIASTOLIC BLOOD PRESSURE: 70 MMHG

## 2018-01-23 ENCOUNTER — HOSPITAL ENCOUNTER (OUTPATIENT)
Dept: RADIOLOGY | Facility: HOSPITAL | Age: 78
Discharge: HOME | End: 2018-01-23
Attending: SURGERY
Payer: MEDICARE

## 2018-01-23 ENCOUNTER — TRANSCRIBE ORDERS (OUTPATIENT)
Dept: ADMINISTRATIVE | Facility: HOSPITAL | Age: 78
End: 2018-01-23

## 2018-01-23 ENCOUNTER — APPOINTMENT (OUTPATIENT)
Dept: LAB | Facility: HOSPITAL | Age: 78
End: 2018-01-23
Attending: FAMILY MEDICINE
Payer: MEDICARE

## 2018-01-23 VITALS
DIASTOLIC BLOOD PRESSURE: 80 MMHG | HEIGHT: 60 IN | WEIGHT: 155.13 LBS | TEMPERATURE: 97.8 F | RESPIRATION RATE: 18 BRPM | OXYGEN SATURATION: 91 % | BODY MASS INDEX: 30.46 KG/M2 | HEART RATE: 68 BPM | SYSTOLIC BLOOD PRESSURE: 140 MMHG

## 2018-01-23 DIAGNOSIS — C50.912 MALIGNANT NEOPLASM OF LEFT FEMALE BREAST, UNSPECIFIED ESTROGEN RECEPTOR STATUS, UNSPECIFIED SITE OF BREAST (HCC): ICD-10-CM

## 2018-01-23 DIAGNOSIS — D64.9 ANEMIA: ICD-10-CM

## 2018-01-23 DIAGNOSIS — C50.912 MALIGNANT NEOPLASM OF LEFT FEMALE BREAST, UNSPECIFIED ESTROGEN RECEPTOR STATUS, UNSPECIFIED SITE OF BREAST (HCC): Primary | ICD-10-CM

## 2018-01-23 LAB
FERRITIN SERPL-MCNC: 9 NG/ML (ref 8–388)
IRON SERPL-MCNC: 36 UG/DL (ref 50–170)

## 2018-01-23 PROCEDURE — 83540 ASSAY OF IRON: CPT

## 2018-01-23 PROCEDURE — 36415 COLL VENOUS BLD VENIPUNCTURE: CPT

## 2018-01-23 PROCEDURE — 86300 IMMUNOASSAY TUMOR CA 15-3: CPT

## 2018-01-23 PROCEDURE — 71046 X-RAY EXAM CHEST 2 VIEWS: CPT

## 2018-01-23 PROCEDURE — 82728 ASSAY OF FERRITIN: CPT

## 2018-01-23 NOTE — MISCELLANEOUS
Reason For Visit  Reason For Visit Free Text Note Form: ANA met with the PT in regards to physician's request for assistance with Life Alert or some type of alert device to be used in the home in the event that the PT would fall and cannot get to a phone  ANA contacted two companies but both were too expensive for the budget of the PT  Representative suggested that the PT call directly to have a phone screening and to discuss financial options  PT states she will contact them when she is home on her land line phone  ANA will continue to look for free/more affordable options for the PT  ANA provided contact number for future/ follow-up needs  Active Problems    1  Abdominal pain, chronic, left lower quadrant (072 71,616 79) (R10 32,G89 29)   2  Advance directive on file (V49 89) (Z78 9)   3  Allergic rhinitis (477 9) (J30 9)   4  Anemia (285 9) (D64 9)   5  Anxiety disorder (300 00) (F41 9)   6  Breast cancer (174 9) (C50 919)   7  Chronic allergic conjunctivitis (372 14) (H10 45)   8  Depression (311) (F32 9)   9  Esophageal reflux (530 81) (K21 9)   10  Fatigue (780 79) (R53 83)   11  Fibromyalgia (729 1) (M79 7)   12  Hyperlipidemia (272 4) (E78 5)   13  Hypertension (401 9) (I10)   14  Hypomagnesemia (275 2) (E83 42)   15  Hypothyroidism (244 9) (E03 9)   16  Insomnia (780 52) (G47 00)   17  Left inguinal hernia (550 90) (K40 90)   18  Lumbar radiculopathy (724 4) (M54 16)   19  Magnesium deficiency (275 2) (E61 2)   20  Nasal congestion (478 19) (R09 81)   21  Need for vaccination with 13-polyvalent pneumococcal conjugate vaccine (V03 82) (Z23)   22  Obstructive sleep apnea (327 23) (G47 33)   23  Opioid use, unspecified, uncomplicated (386 35) (Q44 98)   24  Vitamin D deficiency (268 9) (E55 9)    Current Meds   1  Align Oral Capsule; Therapy: (Recorded:46Afn2596) to Recorded   2  ALPRAZolam 0 5 MG Oral Tablet; one tab tid prn; Therapy: 62LNK7219 to (Last Rx:46Xuo3510) Ordered   3   Depakote  MG Oral Tablet Extended Release 24 Hour (Divalproex Sodium ER); Therapy: (Recorded:12Obl4730) to Recorded   4  Dexilant 60 MG Oral Capsule Delayed Release; TAKE 1 CAPSULE DAILY EVERY   MORNING BEFORE BREAKFAST; Therapy: 24Cja8890 to (Evaluate:20Yjj2768)  Requested for: 04MFK5439; Last   Rx:66Qto8611 Ordered   5  DULoxetine HCl - 30 MG Oral Capsule Delayed Release Particles (Cymbalta); TAKE 1   CAPSULE DAILY  Requested for: 37NTM8862; Last Rx:17Vma6636 Ordered   6  Fluticasone Propionate 50 MCG/ACT Nasal Suspension; USE 1 SPRAY IN EACH   NOSTRIL DAILY; Therapy: 83Bvr8189 to (Last Rx:50Jly4969)  Requested for: 41Wvd1366 Ordered   7  Ketorolac Tromethamine 0 5 % Ophthalmic Solution; INSTILL 1 DROP INTO AFFECTED   EYE(S) 4 TIMES DAILY; Therapy: 06Hsa9790 to (Last Rx:89Gay3999)  Requested for: 36Nce7530 Ordered   8  Levothyroxine Sodium 25 MCG Oral Tablet (Synthroid); take 1 tablet by mouth once daily; Therapy: 59FGX5823 to (Last Rx:30Tzm2465) Ordered   9  Olopatadine HCl - 0 2 % Ophthalmic Solution (Pataday); INSTILL 1 DROP  INTO   AFFECTED EYE(S) ONCE DAILY AS DIRECTED  Requested for: 37Rlb3485; Last   Rx:19Zji7481 Ordered   10  RisperDAL 0 25 MG Oral Tablet (RisperiDONE); Therapy: (Recorded:52Tkw0714) to Recorded   11  Valsartan 80 MG Oral Tablet (Diovan); TAKE 1 TABLET DAILY; Therapy: 23Qtk6138 to (Danuta Rodriguez)  Requested for: 05QKE5876; Last    Rx:46Ock0305 Ordered   12  Vicodin ES 7 5-300 MG Oral Tablet; one to two tabs q4 -q6 hours prn pain for inguinal    hernia; Therapy: 07NRX2898 to (Last Rx:74Udt8441) Ordered    Allergies    1  Duricef TABS   2  Sulfa Drugs    3   Latex    Signatures   Electronically signed by : Santy Banegas; Jan 8 2018 10:44AM EST                       (Author)

## 2018-01-23 NOTE — CONSULTS
Chief Complaint  needs bloodwork for vitamin d , hemoglobin a1c and thyroid      History of Present Illness  HPI: "I feel horrible"  Pt is having more problems ambulating  She recently saw her orthopod who stated she is a candidate for b/l knee replacement but is she medically stable  She has a rx for a walker which she will fill The pt has long standing depression with all her medical problems  She is seeing Dr Corry Conway who is adjusting her depression meds  The pt states she doesn't feel well but most of the time this is the same complaint in every visit  The pt has constant pain and is on long standing narcotics for her pain  Review of Systems    Constitutional: recent 3 lbs lb weight gain  Cardiovascular: No complaints of slow heart rate, no fast heart rate, no chest pain, no palpitations, no leg claudication, no lower extremity edema  Respiratory: No complaints of shortness of breath, no wheezing, no cough, no SOB on exertion, no orthopnea, no PND  Musculoskeletal: arthralgias, myalgias and joint stiffness  Psychiatric: depression  Active Problems    1  Abdominal pain, chronic, left lower quadrant (308 90,298 30) (R10 32,G89 29)   2  Advance directive on file (V49 89) (Z78 9)   3  Allergic rhinitis (477 9) (J30 9)   4  Anemia (285 9) (D64 9)   5  Anxiety disorder (300 00) (F41 9)   6  Breast cancer (174 9) (C50 919)   7  Chronic allergic conjunctivitis (372 14) (H10 45)   8  Depression (311) (F32 9)   9  Esophageal reflux (530 81) (K21 9)   10  Fatigue (780 79) (R53 83)   11  Fibromyalgia (729 1) (M79 7)   12  Hyperlipidemia (272 4) (E78 5)   13  Hypertension (401 9) (I10)   14  Hypothyroidism (244 9) (E03 9)   15  Insomnia (780 52) (G47 00)   16  Left inguinal hernia (550 90) (K40 90)   17  Lumbar radiculopathy (724 4) (M54 16)   18  Magnesium deficiency (275 2) (E61 2)   19  Nasal congestion (478 19) (R09 81)   20   Need for vaccination with 13-polyvalent pneumococcal conjugate vaccine (V03 82) (Z23)   21  Obstructive sleep apnea (327 23) (G47 33)   22  Opioid use, unspecified, uncomplicated (926 14) (E03 31)   23   Vitamin D deficiency (268 9) (E55 9)    Past Medical History    · History of Acute diverticulitis (562 11) (K57 92)   · History of Acute hyperglycemia (790 29) (R73 9)   · History of Allergic conjunctivitis (372 14) (H10 10)   · History of Anxiety disorder (300 00) (F41 9)   · History of Benign Polyps Of The Large Intestine (V12 72)   · History of Breast Cancer (V10 3)   · History of Cat-scratch disease (078 3) (A28 1)   · History of Depression (311) (F32 9)   · History of Ear pain, left (388 70) (H92 02)   · 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)   · History of Feared complaint without diagnosis (V65 5) (Z71 1)   · History of Fibromyalgia (729 1) (M79 7)   · History of Genital warts (078 11) (A63 0)   · History of Herniated lumbar intervertebral disc (722 10) (M51 26)   · History of abdominal pain (V13 89) (V33 901)   · History of allergic rhinitis (V12 69) (Z87 09)   · History of allergic rhinitis (V12 69) (Z87 09)   · History of constipation (V12 79) (Z87 19)   · History of dehydration (V12 29) (Z86 39)   · History of diarrhea (V12 79) (U52 379)   · History of diverticulitis of colon (V12 79) (Z87 19)   · History of eye pain (V12 49) (Z86 69)   · History of fecal impaction (V12 79) (Z87 19)   · History of hemorrhoids (V13 89) (Z87 19)   · History of hypertension (V12 59) (Z86 79)   · History of influenza vaccination (V49 89) (Z92 29)   · History of itching of eye (V12 49) (Z86 69)   · History of muscle pain (V13 59) (Z87 39)   · History of skin pruritus (V13 3) (Z87 2)   · History of urinary frequency (V13 09) (G48 352)   · History of urinary frequency (V13 09) (P98 974)   · History of urinary tract infection (V13 02) (Z87 440)   · History of Irritable bowel syndrome (564 1) (K58 9)   · History of Lyme disease (088 81) (A69 20)   · History of Lymphedema (457 1) (I89 0)   · History of Muscle spasms of neck (728 85) (D67 581)   · History of Muscle strain of chest wall (848 8) (S29 011A)   · History of Need for Tdap vaccination (V06 1) (Z23)   · History of Rectocele (618 04) (N81 6)   · History of Screening for cardiovascular condition (V81 2) (Z13 6)   · History of Screening for diabetes mellitus (V77 1) (Z13 1)   · History of Welcome to Medicare preventive visit (V70 0) (Z00 00)    Surgical History    · History of Breast Surgery Mastectomy   · History of  Section   · History of Cholecystectomy   · History of Complete Colonoscopy   · History of Hysterectomy   · History of Incisional Hernia Repair With Implantation Of Mesh   · History of Partial Colectomy   · History of Partial Colectomy, End Colostomy & Distal Segment Closure    Family History    · No pertinent family history    · Family history of Emphysema    · Family history of Breast Cancer (V16 3)   · Family history of Carcinoma Of The Pancreas   · Family history of Lung Cancer (V16 1)    · Family history of Lung Cancer (V16 1)    · Denied: Family history of Adverse Reaction To Anesthesia   · Denied: Family history of Benign Polyps Of The Large Intestine   · Denied: Family history of Bleeding   · Denied: Family history of Crohn's Disease   · Denied: Family history of Ulcerative Colitis    Social History    · Advance directive on file (V49 89) (Z78 9)   · Denied: History of Alcohol Use (History)   · Denied: History of Drug Use   · Marital History - Currently    · Marital History - Single   · Never a smoker   · Non-smoker (V49 89) (Z78 9)   · Occupation: Retired    Current Meds   1  Align Oral Capsule; Therapy: (Recorded:25Htd6135) to Recorded   2  ALPRAZolam 0 5 MG Oral Tablet; one tab tid prn; Therapy: 23NCP7522 to (Last Rx:2017) Ordered   3  Depakote  MG Oral Tablet Extended Release 24 Hour; Therapy: (Recorded:17Tls1969) to Recorded   4   Dexilant 60 MG Oral Capsule Delayed Release; TAKE 1 CAPSULE DAILY EVERY MORNING BEFORE BREAKFAST; Therapy: 83Ngm8815 to (Evaluate:62Bcy6558)  Requested for: 15AGL8781; Last   Rx:74Paz9116 Ordered   5  DULoxetine HCl - 30 MG Oral Capsule Delayed Release Particles; TAKE 1 CAPSULE   DAILY  Requested for: 79DID0176; Last Rx:15Mar2017 Ordered   6  Fluticasone Propionate 50 MCG/ACT Nasal Suspension; USE 1 SPRAY IN EACH   NOSTRIL DAILY; Therapy: 13Amg7360 to (Last Rx:41Hmi8934)  Requested for: 37Vah7604 Ordered   7  Ketorolac Tromethamine 0 5 % Ophthalmic Solution; INSTILL 1 DROP INTO AFFECTED   EYE(S) 4 TIMES DAILY; Therapy: 98Hnu6324 to (Last Rx:26Idl8657)  Requested for: 34Xdo7975 Ordered   8  Levothyroxine Sodium 25 MCG Oral Tablet; take 1 tablet by mouth once daily; Therapy: 27ZSA2157 to (Last Rx:11Fqg8072) Ordered   9  Olopatadine HCl - 0 2 % Ophthalmic Solution; INSTILL 1 DROP  INTO AFFECTED   EYE(S) ONCE DAILY AS DIRECTED  Requested for: 21Jvs0360; Last Rx:26Raq9934   Ordered   10  RisperDAL 0 25 MG Oral Tablet; Therapy: (Recorded:68Dis6160) to Recorded   11  Valsartan 80 MG Oral Tablet; TAKE 1 TABLET DAILY; Therapy: 73Jbm9512 to (21 )  Requested for: 26Oct2017; Last    Rx:26Oct2017 Ordered   12  Vicodin ES 7 5-300 MG Oral Tablet; one to two tabs q4 -q6 hours prn pain for inguinal    hernia; Therapy: 59YTN7613 to (Last Rx:16Top8784) Ordered    Allergies    1  Duricef TABS   2  Sulfa Drugs    3  Latex    Vitals  Signs    Systolic: 998FNSHUJHQD: 80   Temperature: 97 8 FHeart Rate: 47IQDNLSLWZVL: 33ZRKLAEGB: 102ZSWXOHYHL: 37SWHDEO: 5 ft Weight: 155 lb 2 ozBMI Calculated: 30  3BSA Calculated: 1 68O2 Saturation: 91    Physical Exam    Constitutional   General appearance: Abnormal   chronically ill, obese and appears older than stated age  Pulmonary   Auscultation of lungs: Clear to auscultation  Musculoskeletal uses a cane Very slow walking     Psychiatric   Orientation to person, place, and time: Normal     Mood and affect: Abnormal   Mood and Affect: depressed and despairing  Assessment    1  Anemia (285 9) (D64 9)   2  Fatigue (780 79) (R53 83)   3  Hypothyroidism (244 9) (E03 9)   4  Fibromyalgia (729 1) (M79 7)   5  Vitamin D deficiency (268 9) (E55 9)   6  Hypomagnesemia (275 2) (E83 42)   7  Depression (311) (F32 9)    Plan     Anemia    · (1) CBC/ PLT (NO DIFF); Status:Active; Requested for:22Evk7284; Anxiety disorder    · ALPRAZolam 0 5 MG Oral Tablet; one tab tid prn     Depression    · *VB-Depression Screening; Status:Temporary Deferral - currently treated by psych;     12/26/2018     Hypertension, Hypothyroidism, Vitamin D deficiency    · (1) COMPREHENSIVE METABOLIC PANEL; Status:Active; Requested for:43Rjh0035;      Hypomagnesemia    · (1) MAGNESIUM; Status:Active; Requested for:86Ajx5709;      Hypothyroidism    · (1) TSH; Status:Active; Requested for:14Sqo1119;      Lumbar radiculopathy, Opioid use, unspecified, uncomplicated    · Vicodin ES 7 5-300 MG Oral Tablet; one to two tabs q4 -q6 hours prn pain for  inguinal hernia     Vitamin D deficiency    · (1) VITAMIN D 25-HYDROXY; Status:Active; Requested for:47Dve3370; Pt will get blood work to work up fatigue and possible see if the pt is ready for knee replacement surgery  Pt will continue her antidepressant meds and f/u with Dr Katelin Birmingham  Pt will get the walker for stability when she walks  F/u in 3 months for chronic problems  Alprazolam refilled     Signatures   Electronically signed by : NADIA Rob ; Dec 27 2017  8:33PM EST                       (Author)

## 2018-01-23 NOTE — MISCELLANEOUS
Reason For Visit  Reason For Visit Free Text Note Form: ANA spoke with Benoit Grimaldo [phone] of [employer] in regards to Life Alert (type) services for the PT  This representative explained that we would need to determine if the PT has PERS benefits through her [employer]  This benefit would allow her to have services within the home paid for to assist her in the event that she would fall   Her heatlhcare benefits would cover this if she is eligible  [facility] is one of the companies who offer this service in her area and if eligible, the process can be started to begin the service  ANA then called PT to explain this process and will follow -up with the next step  Active Problems    1  Abdominal pain, chronic, left lower quadrant (488 44,441 88) (R10 32,G89 29)   2  Advance directive on file (V49 89) (Z78 9)   3  Allergic rhinitis (477 9) (J30 9)   4  Anemia (285 9) (D64 9)   5  Anxiety disorder (300 00) (F41 9)   6  Breast cancer (174 9) (C50 919)   7  Chronic allergic conjunctivitis (372 14) (H10 45)   8  Depression (311) (F32 9)   9  Esophageal reflux (530 81) (K21 9)   10  Fatigue (780 79) (R53 83)   11  Fibromyalgia (729 1) (M79 7)   12  Hyperlipidemia (272 4) (E78 5)   13  Hypertension (401 9) (I10)   14  Hypomagnesemia (275 2) (E83 42)   15  Hypothyroidism (244 9) (E03 9)   16  Insomnia (780 52) (G47 00)   17  Left inguinal hernia (550 90) (K40 90)   18  Lumbar radiculopathy (724 4) (M54 16)   19  Magnesium deficiency (275 2) (E61 2)   20  Nasal congestion (478 19) (R09 81)   21  Need for vaccination with 13-polyvalent pneumococcal conjugate vaccine (V03 82) (Z23)   22  Obstructive sleep apnea (327 23) (G47 33)   23  Opioid use, unspecified, uncomplicated (357 35) (X57 25)   24  Vitamin D deficiency (268 9) (E55 9)    Current Meds   1  Align Oral Capsule; Therapy: (Recorded:19Igp0666) to Recorded   2  ALPRAZolam 0 5 MG Oral Tablet; one tab tid prn;    Therapy: 85AVE2248 to (Last Rx:50Haw9324) Ordered   3  Depakote  MG Oral Tablet Extended Release 24 Hour (Divalproex Sodium ER); Therapy: (Recorded:04Lal2324) to Recorded   4  Dexilant 60 MG Oral Capsule Delayed Release; TAKE 1 CAPSULE DAILY EVERY   MORNING BEFORE BREAKFAST; Therapy: 07Meh8916 to (Evaluate:50Hxv2356)  Requested for: 61HXN5340; Last   Rx:30Vug5242 Ordered   5  DULoxetine HCl - 30 MG Oral Capsule Delayed Release Particles (Cymbalta); TAKE 1   CAPSULE DAILY  Requested for: 71MNF3257; Last Rx:44Sbq0429 Ordered   6  Fluticasone Propionate 50 MCG/ACT Nasal Suspension; USE 1 SPRAY IN EACH   NOSTRIL DAILY; Therapy: 66Bwd7280 to (Last Rx:98Fhj0621)  Requested for: 60Kil4762 Ordered   7  Ketorolac Tromethamine 0 5 % Ophthalmic Solution; INSTILL 1 DROP INTO AFFECTED   EYE(S) 4 TIMES DAILY; Therapy: 59Pgl3500 to (Last Rx:23Nee5081)  Requested for: 94Hqa6003 Ordered   8  Levothyroxine Sodium 25 MCG Oral Tablet (Synthroid); take 1 tablet by mouth once daily; Therapy: 73DYT3621 to (Last Rx:77Clf1930) Ordered   9  Olopatadine HCl - 0 2 % Ophthalmic Solution (Pataday); INSTILL 1 DROP  INTO   AFFECTED EYE(S) ONCE DAILY AS DIRECTED  Requested for: 86Zvg4649; Last   Rx:37Puo5995 Ordered   10  RisperDAL 0 25 MG Oral Tablet (RisperiDONE); Therapy: (Recorded:51Byv3848) to Recorded   11  Valsartan 80 MG Oral Tablet (Diovan); TAKE 1 TABLET DAILY; Therapy: 26Rju4957 to (Brittany [place])  Requested for: 38NQB4152; Last    Rx:59Jtu9727 Ordered   12  Vicodin ES 7 5-300 MG Oral Tablet; one to two tabs q4 -q6 hours prn pain for inguinal    hernia; Therapy: 97RUC4586 to (Last Rx:01Fna0772) Ordered    Allergies    1  Duricef TABS   2  Sulfa Drugs    3   Latex    Signatures   Electronically signed by : Santy Smith; Jan 9 2018 11:40AM EST                       (Author)

## 2018-01-24 LAB — CANCER AG27-29 SERPL-ACNC: 15.5 U/ML (ref 0–42.3)

## 2018-01-30 ENCOUNTER — OFFICE VISIT (OUTPATIENT)
Dept: FAMILY MEDICINE CLINIC | Facility: CLINIC | Age: 78
End: 2018-01-30
Payer: MEDICARE

## 2018-01-30 VITALS
SYSTOLIC BLOOD PRESSURE: 120 MMHG | BODY MASS INDEX: 29.88 KG/M2 | WEIGHT: 153 LBS | DIASTOLIC BLOOD PRESSURE: 60 MMHG | RESPIRATION RATE: 16 BRPM | HEART RATE: 64 BPM | OXYGEN SATURATION: 99 %

## 2018-01-30 DIAGNOSIS — D50.8 IRON DEFICIENCY ANEMIA SECONDARY TO INADEQUATE DIETARY IRON INTAKE: Primary | ICD-10-CM

## 2018-01-30 DIAGNOSIS — R10.13 ABDOMINAL PAIN, EPIGASTRIC: ICD-10-CM

## 2018-01-30 DIAGNOSIS — I10 ESSENTIAL HYPERTENSION: ICD-10-CM

## 2018-01-30 DIAGNOSIS — R06.00 DYSPNEA ON EXERTION: ICD-10-CM

## 2018-01-30 LAB
SL AMB POCT GLUCOSE BLD: 135
SL AMB POCT HGB: 7.7

## 2018-01-30 PROCEDURE — 99214 OFFICE O/P EST MOD 30 MIN: CPT | Performed by: FAMILY MEDICINE

## 2018-01-30 PROCEDURE — 85018 HEMOGLOBIN: CPT | Performed by: FAMILY MEDICINE

## 2018-01-30 PROCEDURE — 82948 REAGENT STRIP/BLOOD GLUCOSE: CPT | Performed by: FAMILY MEDICINE

## 2018-01-30 NOTE — PROGRESS NOTES
Assessment/Plan:    No problem-specific Assessment & Plan notes found for this encounter  Diagnoses and all orders for this visit:    Iron deficiency anemia secondary to inadequate dietary iron intake  -     POCT hemoglobin fingerstick    Dyspnea on exertion  -     POCT blood glucose        Subjective:      Patient ID: Anne Cole is a 68 y o  female  HPI  This is a follow-up exam for recent review of blood work where the patient had worsening anemia  The patient also had iron and ferritin levels done which were also low  The patient states that she is having epigastric pain in spite of taking a PPI medication  The patient also has a history of fibromyalgia and has been on Vicodin tablets for treatment of the pain for many years  The patient also states chronic fatigue which seems to be getting worse over the last couple weeks  The patient states her last upper endoscopy was approximately 4 years ago by Dr Bailey Ortiz  Review of Systems   Constitutional:          The patient's gait states decreased activity secondary to the anemia most likely  Respiratory: Negative  Cardiovascular: Positive for palpitations  Gastrointestinal: Positive for abdominal pain (  Patient states epigastric pain most recently  )  Psychiatric/Behavioral:          Patient states continued depression in spite is seen a psychiatrist and being treated with medications  Objective:     Physical Exam   Constitutional: She appears well-developed and well-nourished  Cardiovascular: Normal rate, regular rhythm and normal heart sounds  Pulmonary/Chest: Effort normal and breath sounds normal    Psychiatric: She has a normal mood and affect   Her behavior is normal  Judgment and thought content normal

## 2018-01-30 NOTE — PATIENT INSTRUCTIONS
The patient had a fingerstick hemoglobin of 7 7 today on this visit  The patient will be referred for a full CBC to compare her hemoglobin from the visit on 12/27/2017  The patient will be referred to her GI doctor, Dr Mina Simpson who will most likely need to do an EGD to determine the cause of her anemia and epigastric pain  Note that the patient is a Jehovah's witness and is not a candidate for transfusions  The patient was advised that her iron and ferritin levels were low and that she should start iron supplementation

## 2018-01-31 ENCOUNTER — APPOINTMENT (OUTPATIENT)
Dept: LAB | Facility: CLINIC | Age: 78
End: 2018-01-31
Payer: MEDICARE

## 2018-01-31 DIAGNOSIS — D50.8 IRON DEFICIENCY ANEMIA SECONDARY TO INADEQUATE DIETARY IRON INTAKE: ICD-10-CM

## 2018-01-31 DIAGNOSIS — M79.7 FIBROMYALGIA: Primary | ICD-10-CM

## 2018-01-31 LAB
BASOPHILS # BLD AUTO: 0.03 THOUSANDS/ΜL (ref 0–0.1)
BASOPHILS NFR BLD AUTO: 1 % (ref 0–1)
EOSINOPHIL # BLD AUTO: 0.15 THOUSAND/ΜL (ref 0–0.61)
EOSINOPHIL NFR BLD AUTO: 3 % (ref 0–6)
ERYTHROCYTE [DISTWIDTH] IN BLOOD BY AUTOMATED COUNT: 15.5 % (ref 11.6–15.1)
HCT VFR BLD AUTO: 33 % (ref 34.8–46.1)
HGB BLD-MCNC: 10 G/DL (ref 11.5–15.4)
LYMPHOCYTES # BLD AUTO: 1.18 THOUSANDS/ΜL (ref 0.6–4.47)
LYMPHOCYTES NFR BLD AUTO: 25 % (ref 14–44)
MCH RBC QN AUTO: 26 PG (ref 26.8–34.3)
MCHC RBC AUTO-ENTMCNC: 30.3 G/DL (ref 31.4–37.4)
MCV RBC AUTO: 86 FL (ref 82–98)
MONOCYTES # BLD AUTO: 0.43 THOUSAND/ΜL (ref 0.17–1.22)
MONOCYTES NFR BLD AUTO: 9 % (ref 4–12)
NEUTROPHILS # BLD AUTO: 2.88 THOUSANDS/ΜL (ref 1.85–7.62)
NEUTS SEG NFR BLD AUTO: 62 % (ref 43–75)
NRBC BLD AUTO-RTO: 0 /100 WBCS
PLATELET # BLD AUTO: 254 THOUSANDS/UL (ref 149–390)
PMV BLD AUTO: 10.3 FL (ref 8.9–12.7)
RBC # BLD AUTO: 3.85 MILLION/UL (ref 3.81–5.12)
WBC # BLD AUTO: 4.68 THOUSAND/UL (ref 4.31–10.16)

## 2018-01-31 PROCEDURE — 85025 COMPLETE CBC W/AUTO DIFF WBC: CPT

## 2018-01-31 PROCEDURE — 36415 COLL VENOUS BLD VENIPUNCTURE: CPT

## 2018-02-01 RX ORDER — HYDROCODONE BITARTRATE AND ACETAMINOPHEN 7.5; 3 MG/1; MG/1
1 TABLET ORAL EVERY 6 HOURS PRN
Qty: 180 TABLET | Refills: 0 | Status: SHIPPED | OUTPATIENT
Start: 2018-02-01 | End: 2018-02-21 | Stop reason: SDUPTHER

## 2018-02-02 ENCOUNTER — TELEPHONE (OUTPATIENT)
Dept: FAMILY MEDICINE CLINIC | Facility: CLINIC | Age: 78
End: 2018-02-02

## 2018-02-07 ENCOUNTER — TELEPHONE (OUTPATIENT)
Dept: FAMILY MEDICINE CLINIC | Facility: CLINIC | Age: 78
End: 2018-02-07

## 2018-02-09 ENCOUNTER — PATIENT OUTREACH (OUTPATIENT)
Dept: FAMILY MEDICINE CLINIC | Facility: CLINIC | Age: 78
End: 2018-02-09

## 2018-02-19 ENCOUNTER — TELEPHONE (OUTPATIENT)
Dept: FAMILY MEDICINE CLINIC | Facility: CLINIC | Age: 78
End: 2018-02-19

## 2018-02-19 NOTE — TELEPHONE ENCOUNTER
Dr Marquita Henriquez REFILL FOR HYDROCODONE  SEND TO [facility]

## 2018-02-21 DIAGNOSIS — M79.7 FIBROMYALGIA: ICD-10-CM

## 2018-02-21 RX ORDER — HYDROCODONE BITARTRATE AND ACETAMINOPHEN 7.5; 3 MG/1; MG/1
1 TABLET ORAL EVERY 6 HOURS PRN
Qty: 45 TABLET | Refills: 0 | Status: SHIPPED | OUTPATIENT
Start: 2018-02-21 | End: 2018-03-12 | Stop reason: SDUPTHER

## 2018-02-21 NOTE — TELEPHONE ENCOUNTER
Hydrocodone rx is ready for   It is too early to check iron levels  The pt needs to be on the supplement for at least too months

## 2018-02-27 ENCOUNTER — OFFICE VISIT (OUTPATIENT)
Dept: FAMILY MEDICINE CLINIC | Facility: CLINIC | Age: 78
End: 2018-02-27
Payer: MEDICARE

## 2018-02-27 VITALS
DIASTOLIC BLOOD PRESSURE: 70 MMHG | RESPIRATION RATE: 18 BRPM | WEIGHT: 152 LBS | OXYGEN SATURATION: 96 % | BODY MASS INDEX: 29.69 KG/M2 | HEART RATE: 80 BPM | SYSTOLIC BLOOD PRESSURE: 130 MMHG

## 2018-02-27 DIAGNOSIS — F41.9 ANXIETY: ICD-10-CM

## 2018-02-27 DIAGNOSIS — F33.1 MODERATE EPISODE OF RECURRENT MAJOR DEPRESSIVE DISORDER (HCC): ICD-10-CM

## 2018-02-27 DIAGNOSIS — D50.9 IRON DEFICIENCY ANEMIA, UNSPECIFIED IRON DEFICIENCY ANEMIA TYPE: Primary | ICD-10-CM

## 2018-02-27 DIAGNOSIS — E03.9 ACQUIRED HYPOTHYROIDISM: ICD-10-CM

## 2018-02-27 PROCEDURE — 99214 OFFICE O/P EST MOD 30 MIN: CPT | Performed by: FAMILY MEDICINE

## 2018-02-27 RX ORDER — VENLAFAXINE HYDROCHLORIDE 75 MG/1
150 CAPSULE, EXTENDED RELEASE ORAL EVERY MORNING
COMMUNITY
Start: 2018-02-12 | End: 2018-11-08 | Stop reason: ALTCHOICE

## 2018-02-27 NOTE — PROGRESS NOTES
Assessment/Plan:    No problem-specific Assessment & Plan notes found for this encounter  Diagnoses and all orders for this visit:    Iron deficiency anemia, unspecified iron deficiency anemia type  -     CBC and differential; Future  -     Ferritin; Future  -     Iron; Future    Moderate episode of recurrent major depressive disorder (HCC)    Acquired hypothyroidism    Anxiety    Other orders  -     venlafaxine (EFFEXOR-XR) 75 mg 24 hr capsule;         Subjective:      Patient ID: David Reilly is a 68 y o  female  This is a follow-up appointment for fatigue complaints  The patient has iron deficiency anemia and has been on iron supplements for about 1 month  Patient was told today that is too early to check a CBC  Patient was given an order to check his CBC in 1 month  The patient also was complaining of fatigue symptoms unrelated she thinks to her iron deficiency anemia  The patient is seen a psychiatrist for longstanding depression  He recently took her off Depakote and Cymbalta and started on Effexor 75 milligrams daily  Patient also takes alprazolam 0 5 milligrams 3 times a day  The patient was requesting increased dose of the alprazolam but she was told the anxiety should be treated with the Effexor  The present time she denies constipation secondary to the iron supplementation  The patient is waiting for an EGD that should be scheduled with her GI doctor on Friday  The following portions of the patient's history were reviewed and updated as appropriate: allergies, current medications, past family history, past medical history, past social history, past surgical history and problem list     Review of Systems   Constitutional:          The patient states she has decreased activity secondary to fatigue symptoms   Respiratory: Negative  Cardiovascular: Negative      Gastrointestinal:         The patient complains of abdominal bloating symptoms   Musculoskeletal: Positive for arthralgias and myalgias  Neurological: Negative  Psychiatric/Behavioral: Positive for sleep disturbance  Objective:      /70   Pulse 80   Resp 18   Wt 68 9 kg (152 lb)   SpO2 96%   BMI 29 69 kg/m²          Physical Exam   Constitutional: She appears well-developed and well-nourished  Patient is overweight with a BMI of 29 69   Cardiovascular: Normal rate, regular rhythm and normal heart sounds  Pulmonary/Chest: Effort normal and breath sounds normal    Musculoskeletal:    Patient uses a cane to walk  Psychiatric: Her behavior is normal  Judgment and thought content normal      Patient has depressed mood today  Vitals reviewed

## 2018-02-27 NOTE — PATIENT INSTRUCTIONS
The patient has iron deficiency anemia and a CBC and diff, iron, and ferritin level were ordered in 1 month  The anemia probably explains the patient's symptoms of fatigue  The patient also has depression has been restarted on Effexor 75 milligrams by her psychiatrist   The psychiatrist stopped her Cymbalta and Depakote  The patient has hypothyroidism but her last 3 levels have been stable  There are no changes in her levothyroxine dose  Patient also has anxiety and/or he takes alprazolam 0 5 milligrams t i d  This medication was not changed  The patient was advised to follow up in 1 month after she gets her CBC and diff, iron and ferritin level

## 2018-03-05 ENCOUNTER — TELEPHONE (OUTPATIENT)
Dept: FAMILY MEDICINE CLINIC | Facility: CLINIC | Age: 78
End: 2018-03-05

## 2018-03-05 DIAGNOSIS — F41.9 ANXIETY: Primary | ICD-10-CM

## 2018-03-05 RX ORDER — CLONAZEPAM 0.5 MG/1
0.5 TABLET, ORALLY DISINTEGRATING ORAL 2 TIMES DAILY
Qty: 60 TABLET | Refills: 0 | OUTPATIENT
Start: 2018-03-05 | End: 2018-03-08

## 2018-03-06 NOTE — TELEPHONE ENCOUNTER
PT CALLED RE REFILL OF ALPRAZOLAM ,ASKS IF YOU WILL ORDER TO [facility]  ASKS IF YOU COULD DO THIS ASAP SO THAT HER CHIQUITA CAN  AT PHARMACY AROUND 530

## 2018-03-06 NOTE — TELEPHONE ENCOUNTER
Pt meant that her xanax wasn't covered but alprazolam is  She cant afford clonazapam its $65   Can you change it?

## 2018-03-08 ENCOUNTER — TELEPHONE (OUTPATIENT)
Dept: FAMILY MEDICINE CLINIC | Facility: CLINIC | Age: 78
End: 2018-03-08

## 2018-03-08 DIAGNOSIS — F41.9 ANXIETY: Primary | ICD-10-CM

## 2018-03-08 RX ORDER — CLONAZEPAM 0.5 MG/1
0.5 TABLET ORAL 2 TIMES DAILY
Qty: 60 TABLET | Refills: 0 | OUTPATIENT
Start: 2018-03-08 | End: 2018-03-12 | Stop reason: SINTOL

## 2018-03-08 RX ORDER — ALPRAZOLAM 0.5 MG/1
0.5 TABLET ORAL 3 TIMES DAILY PRN
Qty: 30 TABLET | Refills: 0 | OUTPATIENT
Start: 2018-03-08

## 2018-03-08 NOTE — TELEPHONE ENCOUNTER
Dr Baylee Pena Pt asking if you could take care of this for her since Dr Gemini Gamboa hasn't sent the alprazolam  She didn't get the clonazepam bc of cost

## 2018-03-09 ENCOUNTER — TELEPHONE (OUTPATIENT)
Dept: FAMILY MEDICINE CLINIC | Facility: CLINIC | Age: 78
End: 2018-03-09

## 2018-03-09 DIAGNOSIS — G89.4 CHRONIC PAIN SYNDROME: Primary | ICD-10-CM

## 2018-03-09 NOTE — TELEPHONE ENCOUNTER
I spoke to this pt yesterday and reordered Clonazepam generic so see can afford it  It should have been called in today

## 2018-03-12 ENCOUNTER — TELEPHONE (OUTPATIENT)
Dept: FAMILY MEDICINE CLINIC | Facility: CLINIC | Age: 78
End: 2018-03-12

## 2018-03-12 ENCOUNTER — OFFICE VISIT (OUTPATIENT)
Dept: FAMILY MEDICINE CLINIC | Facility: CLINIC | Age: 78
End: 2018-03-12
Payer: MEDICARE

## 2018-03-12 VITALS
WEIGHT: 154 LBS | DIASTOLIC BLOOD PRESSURE: 72 MMHG | OXYGEN SATURATION: 96 % | SYSTOLIC BLOOD PRESSURE: 144 MMHG | RESPIRATION RATE: 18 BRPM | HEART RATE: 84 BPM | BODY MASS INDEX: 30.08 KG/M2

## 2018-03-12 DIAGNOSIS — F41.9 ANXIETY: ICD-10-CM

## 2018-03-12 DIAGNOSIS — E03.9 HYPOTHYROIDISM, UNSPECIFIED TYPE: ICD-10-CM

## 2018-03-12 DIAGNOSIS — T50.905A DRUG REACTION, INITIAL ENCOUNTER: Primary | ICD-10-CM

## 2018-03-12 DIAGNOSIS — D50.9 IRON DEFICIENCY ANEMIA, UNSPECIFIED IRON DEFICIENCY ANEMIA TYPE: ICD-10-CM

## 2018-03-12 DIAGNOSIS — M79.7 FIBROMYALGIA: ICD-10-CM

## 2018-03-12 DIAGNOSIS — F33.1 MODERATE EPISODE OF RECURRENT MAJOR DEPRESSIVE DISORDER (HCC): ICD-10-CM

## 2018-03-12 PROCEDURE — 99213 OFFICE O/P EST LOW 20 MIN: CPT | Performed by: FAMILY MEDICINE

## 2018-03-12 RX ORDER — ALPRAZOLAM 0.5 MG/1
0.5 TABLET ORAL 3 TIMES DAILY PRN
Qty: 30 TABLET | Refills: 1 | Status: SHIPPED | OUTPATIENT
Start: 2018-03-12 | End: 2018-04-04 | Stop reason: SDUPTHER

## 2018-03-12 RX ORDER — HYDROCODONE BITARTRATE AND ACETAMINOPHEN 7.5; 3 MG/1; MG/1
1 TABLET ORAL EVERY 6 HOURS PRN
Qty: 45 TABLET | Refills: 0 | Status: SHIPPED | OUTPATIENT
Start: 2018-03-12 | End: 2018-04-04 | Stop reason: SDUPTHER

## 2018-03-12 RX ORDER — HYDROCODONE BITARTRATE AND IBUPROFEN 7.5; 2 MG/1; MG/1
1 TABLET, FILM COATED ORAL EVERY 6 HOURS PRN
Qty: 45 TABLET | Refills: 0 | Status: SHIPPED | OUTPATIENT
Start: 2018-03-12 | End: 2018-04-02

## 2018-03-12 NOTE — TELEPHONE ENCOUNTER
PT CALLED TO ASK WHY THE PAIN MED YOU ORDERED WAS CHANGED FROM HYDROCODONE ACETAMINOPHEN  TO HYDROCODONE IBUPROFEN

## 2018-03-13 NOTE — TELEPHONE ENCOUNTER
Please call pt to  Hydrocodone 7 5 mg/ Acetominophen 300 mg rx     Hydrocodone 7 5/200 mg Ibuprofen should be canceled

## 2018-03-13 NOTE — PROGRESS NOTES
Subjective   Isabel Medrano is a 68 y o  female who presents for management of medication side effects and for follow up of anxiety disorder  Current symptoms: feelings of losing control, racing thoughts  She denies any other symptoms including somatic symptoms, situational anxiety, or paranoia  She denies current suicidal and homicidal ideation  Her medications were recently switched from xanax 0 5 mg PO TID to klonopin 0 5 mg PO BID  When she first took her klonopin a few days ago, she noted that she very quickly became fatigued / drowsy to the point that she had trouble standing up and ambulating  She made it to her bed and laid down, and her symptoms subsided after about 15 minutes  She has not taken any more klonopin since then  The patient states she has been on the same dose and schedule of xanax for "years" now and it was working well for her despite requesting an increase in dose on her last visit  She wishes to be placed back on xanax, but again requests a dose increase of the xanax  She denies any chest pain, sob, syncope, visual changes, n/v/d, rashes, abdominal pain, headaches, lightheadedness, dizziness, palpitations, or other symptoms at the time of her drug reaction or since  She denies any current lingering side effects from her medication  The following portions of the patient's history were reviewed and updated as appropriate: allergies, current medications, past family history, past medical history, past social history, past surgical history and problem list     Objective   /72   Pulse 84   Resp 18   Wt 69 9 kg (154 lb)   SpO2 96%   BMI 30 08 kg/m²      Physical Exam   Constitutional: She is oriented to person, place, and time  She appears well-developed and well-nourished  No distress  HENT:   Head: Normocephalic and atraumatic  Eyes: Right eye exhibits no discharge  Left eye exhibits no discharge  No scleral icterus  Neck: Neck supple  No JVD present  Cardiovascular: Normal rate and regular rhythm  Exam reveals no gallop and no friction rub  No murmur heard  Pulmonary/Chest: Effort normal and breath sounds normal  No stridor  No respiratory distress  She has no wheezes  Abdominal: Soft  Bowel sounds are normal  She exhibits no distension  There is no tenderness  Neurological: She is alert and oriented to person, place, and time  No sensory deficit  She exhibits normal muscle tone  Skin: Skin is warm and dry  Capillary refill takes less than 2 seconds  She is not diaphoretic  No erythema  No pallor  Psychiatric: Her speech is normal and behavior is normal  Judgment and thought content normal  Her mood appears anxious  Cognition and memory are normal    Patient became tearful when told I cannot increase her xanax dose         Assessment/Plan      1  Anxiety Disorder - unchanged    · Medications: Continue effexor as prescribed by psychiatrist  Klonopin 0 5 mg BID discontinued  Reordered xanax 0 5 mg PO TID as patient was originally taking  · Continue with psychiatry as originally scheduled  Counseled that she should not have two different providers prescibing different medications  Counseled that if she wants her dose of xanax increased, she may bring this up to her psychiatrist   · Reviewed concept of anxiety as biochemical imbalance of neurotransmitters and rationale for treatment  · Instructed patient to contact office or on-call physician promptly should condition worsen or any new symptoms appear and provided on-call telephone numbers  IF THE PATIENT HAS ANY SUICIDAL OR HOMICIDAL IDEATIONS, CALL THE OFFICE, DISCUSS WITH A SUPPORT MEMBER, OR GO TO THE ER IMMEDIATELY  Patient was agreeable with this plan  · Follow up: 1 month       2  Health Maintenance    · Patient states that she is due for repeat blood count for her chronic iron deficiency anemia    · She is also due for another TSH check as she has been getting them every 3 months and her last one was in December of 2017  · Lab requisitions for CBC and TSH provided  All patient questions and concerns were addressed  Return precautions reviewed  Medication side effects reviewed      Ashlee Waldron DO  3/12/2018

## 2018-03-14 ENCOUNTER — TELEPHONE (OUTPATIENT)
Dept: FAMILY MEDICINE CLINIC | Facility: CLINIC | Age: 78
End: 2018-03-14

## 2018-03-14 NOTE — TELEPHONE ENCOUNTER
Pt called re klonopin,was given to wafers that dissolve in water,asks if you will order tablets,would like to speak to you

## 2018-03-16 ENCOUNTER — LAB (OUTPATIENT)
Dept: LAB | Facility: CLINIC | Age: 78
End: 2018-03-16
Payer: MEDICARE

## 2018-03-16 DIAGNOSIS — E03.9 HYPOTHYROIDISM, UNSPECIFIED TYPE: ICD-10-CM

## 2018-03-16 DIAGNOSIS — D50.9 IRON DEFICIENCY ANEMIA, UNSPECIFIED IRON DEFICIENCY ANEMIA TYPE: ICD-10-CM

## 2018-03-16 DIAGNOSIS — F41.9 ANXIETY: Primary | ICD-10-CM

## 2018-03-16 LAB
BASOPHILS # BLD AUTO: 0.03 THOUSANDS/ΜL (ref 0–0.1)
BASOPHILS NFR BLD AUTO: 1 % (ref 0–1)
EOSINOPHIL # BLD AUTO: 0.09 THOUSAND/ΜL (ref 0–0.61)
EOSINOPHIL NFR BLD AUTO: 2 % (ref 0–6)
ERYTHROCYTE [DISTWIDTH] IN BLOOD BY AUTOMATED COUNT: 16.7 % (ref 11.6–15.1)
FERRITIN SERPL-MCNC: 14 NG/ML (ref 8–388)
HCT VFR BLD AUTO: 33.6 % (ref 34.8–46.1)
HGB BLD-MCNC: 10.5 G/DL (ref 11.5–15.4)
IRON SERPL-MCNC: 171 UG/DL (ref 50–170)
LYMPHOCYTES # BLD AUTO: 0.77 THOUSANDS/ΜL (ref 0.6–4.47)
LYMPHOCYTES NFR BLD AUTO: 20 % (ref 14–44)
MCH RBC QN AUTO: 27.3 PG (ref 26.8–34.3)
MCHC RBC AUTO-ENTMCNC: 31.3 G/DL (ref 31.4–37.4)
MCV RBC AUTO: 88 FL (ref 82–98)
MONOCYTES # BLD AUTO: 0.42 THOUSAND/ΜL (ref 0.17–1.22)
MONOCYTES NFR BLD AUTO: 11 % (ref 4–12)
NEUTROPHILS # BLD AUTO: 2.58 THOUSANDS/ΜL (ref 1.85–7.62)
NEUTS SEG NFR BLD AUTO: 66 % (ref 43–75)
NRBC BLD AUTO-RTO: 0 /100 WBCS
PLATELET # BLD AUTO: 230 THOUSANDS/UL (ref 149–390)
PMV BLD AUTO: 10.7 FL (ref 8.9–12.7)
RBC # BLD AUTO: 3.84 MILLION/UL (ref 3.81–5.12)
TSH SERPL DL<=0.05 MIU/L-ACNC: 1.15 UIU/ML (ref 0.36–3.74)
WBC # BLD AUTO: 3.91 THOUSAND/UL (ref 4.31–10.16)

## 2018-03-16 PROCEDURE — 82728 ASSAY OF FERRITIN: CPT

## 2018-03-16 PROCEDURE — 36415 COLL VENOUS BLD VENIPUNCTURE: CPT

## 2018-03-16 PROCEDURE — 83540 ASSAY OF IRON: CPT

## 2018-03-16 PROCEDURE — 84443 ASSAY THYROID STIM HORMONE: CPT

## 2018-03-16 PROCEDURE — 85025 COMPLETE CBC W/AUTO DIFF WBC: CPT

## 2018-03-16 RX ORDER — CLONAZEPAM 0.5 MG/1
0.5 TABLET ORAL 2 TIMES DAILY
Qty: 14 TABLET | Refills: 0 | OUTPATIENT
Start: 2018-03-16 | End: 2018-04-02

## 2018-03-16 NOTE — TELEPHONE ENCOUNTER
I called Clonazepam 0 5 mg , #14 one bid into [facility]  I spent 30 minutes on the phone with the pt going over her anxiety meds    She will be calling Dr Lina Steven for further anxiety management

## 2018-03-16 NOTE — TELEPHONE ENCOUNTER
If possible can you call pt after 4 today  She is going to get bw soon  She said the klonopin says do not swallow and it hits her like a ton of bricks     She wants to try the pill form and asks if you can order it today

## 2018-03-16 NOTE — TELEPHONE ENCOUNTER
Pt called again to make sure she didn't miss dr Liyah Mark phone call, I spoke to dr Mayuri Pathak who will call her back after precepting

## 2018-03-20 ENCOUNTER — TRANSCRIBE ORDERS (OUTPATIENT)
Dept: ADMINISTRATIVE | Facility: HOSPITAL | Age: 78
End: 2018-03-20

## 2018-03-20 ENCOUNTER — TELEPHONE (OUTPATIENT)
Dept: FAMILY MEDICINE CLINIC | Facility: CLINIC | Age: 78
End: 2018-03-20

## 2018-03-20 DIAGNOSIS — R10.31 ABDOMINAL PAIN, RIGHT LOWER QUADRANT: ICD-10-CM

## 2018-03-20 DIAGNOSIS — R10.32 ABDOMINAL PAIN, LEFT LOWER QUADRANT: Primary | ICD-10-CM

## 2018-03-20 NOTE — TELEPHONE ENCOUNTER
Dr Joselin Handy  SHE IS GOING BACK TO  [facility]  SHE IS VERY UPSET  [facility] IS SENDING HER FOR COLONOSCOPY/EGD, AND CAT SCAN  AND SHE'S VERY NERVOUS ABOUT THE TESTS  SHE WANTS TO SPEAK TO YOU   WOULD LIKE CALL BACK TODAY

## 2018-03-22 DIAGNOSIS — F41.9 ANXIETY: ICD-10-CM

## 2018-03-22 RX ORDER — ALPRAZOLAM 0.5 MG/1
0.5 TABLET ORAL 3 TIMES DAILY PRN
Qty: 30 TABLET | Refills: 0 | Status: CANCELLED | OUTPATIENT
Start: 2018-03-22

## 2018-03-22 NOTE — TELEPHONE ENCOUNTER
I will have the nurses call in hr Alprazolam 0 5 mg rx   She will stop Clonazepam because of side effects  The rx for Alprazolam will be 0 5 mg , #90 , one tid

## 2018-03-22 NOTE — TELEPHONE ENCOUNTER
Pt calling again  She has been taking 3 xanax a day as prescribed and now only has 3 left  Klonopin made her feel sick so cant take them  She said they have opposite effect  She is scared that she is going to go to withdrawal    Tried calling dr Sage Scott but hasnt received a call back  Would like a refill of xanax today  Patient actually has a refill  And it is ready at [address] [place] [address]  Pt aware      Disregard request

## 2018-03-23 ENCOUNTER — HOSPITAL ENCOUNTER (OUTPATIENT)
Dept: RADIOLOGY | Facility: HOSPITAL | Age: 78
Discharge: HOME | End: 2018-03-23
Attending: INTERNAL MEDICINE
Payer: MEDICARE

## 2018-03-23 ENCOUNTER — TRANSCRIBE ORDERS (OUTPATIENT)
Dept: ADMINISTRATIVE | Facility: HOSPITAL | Age: 78
End: 2018-03-23

## 2018-03-23 ENCOUNTER — APPOINTMENT (OUTPATIENT)
Dept: LAB | Facility: HOSPITAL | Age: 78
End: 2018-03-23
Attending: INTERNAL MEDICINE
Payer: MEDICARE

## 2018-03-23 DIAGNOSIS — R19.7 DIARRHEA, UNSPECIFIED TYPE: ICD-10-CM

## 2018-03-23 DIAGNOSIS — D64.9 ANEMIA, UNSPECIFIED TYPE: ICD-10-CM

## 2018-03-23 DIAGNOSIS — R23.2 FLUSHING: ICD-10-CM

## 2018-03-23 DIAGNOSIS — D64.9 ANEMIA, UNSPECIFIED TYPE: Primary | ICD-10-CM

## 2018-03-23 DIAGNOSIS — R10.31 ABDOMINAL PAIN, RIGHT LOWER QUADRANT: ICD-10-CM

## 2018-03-23 DIAGNOSIS — R10.32 ABDOMINAL PAIN, LEFT LOWER QUADRANT: ICD-10-CM

## 2018-03-23 PROCEDURE — 82784 ASSAY IGA/IGD/IGG/IGM EACH: CPT

## 2018-03-23 PROCEDURE — 82941 ASSAY OF GASTRIN: CPT

## 2018-03-23 PROCEDURE — 36415 COLL VENOUS BLD VENIPUNCTURE: CPT

## 2018-03-23 PROCEDURE — 83516 IMMUNOASSAY NONANTIBODY: CPT

## 2018-03-23 PROCEDURE — 86255 FLUORESCENT ANTIBODY SCREEN: CPT

## 2018-03-23 PROCEDURE — 74250 X-RAY XM SM INT 1CNTRST STD: CPT

## 2018-03-24 LAB
ENDOMYSIUM IGA SER QL: NEGATIVE
GLIADIN PEPTIDE IGA SER-ACNC: 3 UNITS (ref 0–19)
GLIADIN PEPTIDE IGG SER-ACNC: 4 UNITS (ref 0–19)
IGA SERPL-MCNC: 143 MG/DL (ref 64–422)
TTG IGA SER-ACNC: <2 U/ML (ref 0–3)
TTG IGG SER-ACNC: <2 U/ML (ref 0–5)

## 2018-03-25 LAB — GASTRIN SERPL-MCNC: 364 PG/ML (ref 0–115)

## 2018-03-30 ENCOUNTER — APPOINTMENT (OUTPATIENT)
Dept: LAB | Facility: CLINIC | Age: 78
End: 2018-03-30
Payer: MEDICARE

## 2018-03-30 ENCOUNTER — TRANSCRIBE ORDERS (OUTPATIENT)
Dept: LAB | Facility: CLINIC | Age: 78
End: 2018-03-30

## 2018-03-30 DIAGNOSIS — R23.2 FLUSHING: Primary | ICD-10-CM

## 2018-03-30 DIAGNOSIS — R19.7 DIARRHEA, UNSPECIFIED TYPE: ICD-10-CM

## 2018-03-30 DIAGNOSIS — D64.9 ANEMIA, UNSPECIFIED TYPE: ICD-10-CM

## 2018-03-30 PROCEDURE — 83497 ASSAY OF 5-HIAA: CPT

## 2018-04-02 ENCOUNTER — ANESTHESIA EVENT (OUTPATIENT)
Dept: GASTROENTEROLOGY | Facility: AMBULARY SURGERY CENTER | Age: 78
End: 2018-04-02
Payer: MEDICARE

## 2018-04-02 NOTE — PRE-PROCEDURE INSTRUCTIONS
Pre-Surgery Instructions:   Medication Instructions    ALPRAZolam (XANAX) 0 5 mg tablet Patient was instructed by Physician and understands   Cholecalciferol (VITAMIN D-3 PO) Patient was instructed by Physician and understands   dexlansoprazole (DEXILANT) 60 MG capsule Patient was instructed by Physician and understands   docusate sodium (COLACE) 100 mg capsule Patient was instructed by Physician and understands   HYDROcodone-acetaminophen (XODOL) 7 5-300 MG per tablet Patient was instructed by Physician and understands   levothyroxine 25 mcg tablet Patient was instructed by Physician and understands   valsartan (DIOVAN) 80 mg tablet Patient was instructed by Physician and understands   venlafaxine (EFFEXOR-XR) 75 mg 24 hr capsule Patient was instructed by Physician and understands

## 2018-04-03 ENCOUNTER — ANESTHESIA (OUTPATIENT)
Dept: GASTROENTEROLOGY | Facility: AMBULARY SURGERY CENTER | Age: 78
End: 2018-04-03
Payer: MEDICARE

## 2018-04-03 ENCOUNTER — HOSPITAL ENCOUNTER (OUTPATIENT)
Facility: AMBULARY SURGERY CENTER | Age: 78
Setting detail: OUTPATIENT SURGERY
Discharge: HOME | End: 2018-04-03
Attending: INTERNAL MEDICINE | Admitting: INTERNAL MEDICINE
Payer: MEDICARE

## 2018-04-03 VITALS
HEART RATE: 50 BPM | TEMPERATURE: 97.4 F | SYSTOLIC BLOOD PRESSURE: 154 MMHG | OXYGEN SATURATION: 97 % | DIASTOLIC BLOOD PRESSURE: 73 MMHG | RESPIRATION RATE: 18 BRPM

## 2018-04-03 DIAGNOSIS — R19.7 DIARRHEA: ICD-10-CM

## 2018-04-03 DIAGNOSIS — D50.9 IRON DEFICIENCY ANEMIA: ICD-10-CM

## 2018-04-03 DIAGNOSIS — R10.84 GENERALIZED ABDOMINAL PAIN: ICD-10-CM

## 2018-04-03 PROCEDURE — 88342 IMHCHEM/IMCYTCHM 1ST ANTB: CPT | Performed by: PATHOLOGY

## 2018-04-03 PROCEDURE — 88305 TISSUE EXAM BY PATHOLOGIST: CPT | Performed by: PATHOLOGY

## 2018-04-03 RX ORDER — SODIUM CHLORIDE 9 MG/ML
125 INJECTION, SOLUTION INTRAVENOUS CONTINUOUS
Status: DISCONTINUED | OUTPATIENT
Start: 2018-04-03 | End: 2018-04-03 | Stop reason: HOSPADM

## 2018-04-03 RX ORDER — PROPOFOL 10 MG/ML
INJECTION, EMULSION INTRAVENOUS AS NEEDED
Status: DISCONTINUED | OUTPATIENT
Start: 2018-04-03 | End: 2018-04-03 | Stop reason: SURG

## 2018-04-03 RX ORDER — PROPOFOL 10 MG/ML
INJECTION, EMULSION INTRAVENOUS CONTINUOUS PRN
Status: DISCONTINUED | OUTPATIENT
Start: 2018-04-03 | End: 2018-04-03 | Stop reason: SURG

## 2018-04-03 RX ADMIN — LIDOCAINE HYDROCHLORIDE 50 MG: 20 INJECTION, SOLUTION INTRAVENOUS at 13:25

## 2018-04-03 RX ADMIN — PROPOFOL 80 MG: 10 INJECTION, EMULSION INTRAVENOUS at 13:25

## 2018-04-03 RX ADMIN — SODIUM CHLORIDE 125 ML/HR: 0.9 INJECTION, SOLUTION INTRAVENOUS at 12:47

## 2018-04-03 RX ADMIN — SODIUM CHLORIDE: 0.9 INJECTION, SOLUTION INTRAVENOUS at 13:17

## 2018-04-03 RX ADMIN — PROPOFOL 100 MCG/KG/MIN: 10 INJECTION, EMULSION INTRAVENOUS at 13:25

## 2018-04-03 NOTE — OP NOTE
OPERATIVE REPORT  PATIENT NAME: Linda Alford    :  1940  MRN: [id number]  Pt Location: [facility] GI ROOM 01    SURGERY DATE: 4/3/2018    Surgeon(s) and Role:     * Ricardo Levin MD - Primary    Preop Diagnosis:  Generalized abdominal pain [R10 84]  Iron deficiency anemia [D50 9]  Diarrhea [R19 7]    Post-Op Diagnosis Codes:     * Generalized abdominal pain [R10 84]     * Iron deficiency anemia [D50 9]     * Diarrhea [R19 7]     * Colitis    Procedure(s) (LRB):  ESOPHAGOGASTRODUODENOSCOPY (EGD) (N/A)  COLONOSCOPY (N/A)     EGD with biopsy and cold snare polypectomy colonoscopy with biopsy    Specimen(s):  ID Type Source Tests Collected by Time Destination   1 : 1  cold bx  antrum, R/O h pylori Tissue Stomach TISSUE Juarez Meckel Ricardo Levin MD 4/3/2018 1332    2 : 2  cold snare gastric polyp  Tissue Stomach TISSUE EXAM Ricardo Levin MD 4/3/2018 1332    3 : 3  cold bx  ulcer sigmoid colon Tissue Colon TISSUE Yanet Crespo MD 4/3/2018 1348        Estimated Blood Loss:   Minimal       Anesthesia Type:   IV Sedation with Anesthesia    Operative Indications:  Generalized abdominal pain [R10 84]  Iron deficiency anemia [D50 9]  Diarrhea [R19 7]      Operative Findings:    1   2 cm hiatal hernia  2   Multiple small and medium size polyps in the proximal stomach, 1 of which was within the hernia sac at the level of the diaphragm and appeared inflamed  This was removed with cold snare  3   Multiple colonic diverticula  4   Mild patchy erythema and ulceration in the sigmoid colon at the level of the ileocolonic anastomosis  Question Crohn's disease versus segmental colitis associated with diverticulosis  Recommendations:    1  Follow-up biopsy results  2   Follow up with me  Complications:   None    Procedure and Technique:  After adequate sedation by Anesthesia Service, the Olympus upper endoscope was passed through the mouth and advanced to the 3rd portion of the duodenum   Upon withdrawal of the scope, all mucosal surfaces were carefully examined  The patient was then repositioned and the Olympus colonoscope advanced through the anus to the distal small bowel  The quality of the preparation was adequate  The GE junction was located approximately 34 cm  There was a 2 cm sliding hiatal hernia with a 6 mm polyp within the hernia sac at the level of the diaphragm which appeared inflamed  This was removed with cold snare and collected for pathology  Several other polyps were noted in the proximal stomach and were not removed  The stomach appeared normal otherwise  Antral biopsies were obtained for pathology  The duodenum was normal  There were numerous sigmoid diverticula, as well as a few diverticula in the distal small bowel  There was a single small ulceration at the ileocolonic anastomosis and mild patchy erythema in this area and within 3 cm distal to the anastomosis  Multiple biopsies were obtained   The colonic and small bowel mucosa was otherwise normal     Patient Disposition:  Stable    SIGNATURE: Javier Taylor MD  DATE: April 3, 2018  TIME: 1:53 PM

## 2018-04-03 NOTE — DISCHARGE INSTRUCTIONS
Follow-up  in office 1-2 weeks  Resume medications  Moderate Sedation   WHAT YOU NEED TO KNOW:   Moderate sedation, or conscious sedation, is medicine used during procedures to help you feel relaxed and calm  You will be awake and able to follow directions without anxiety or pain  You will remember little to none of the procedure  You may feel tired, weak, or unsteady on your feet after you get sedation  You may also have trouble concentrating or short-term memory loss  These symptoms should go away in 24 hours or less  DISCHARGE INSTRUCTIONS:   Call 911 or have someone else call for any of the following:   · You have sudden trouble breathing  · You cannot be woken  Seek care immediately if:   · You have a severe headache or dizziness  · Your heart is beating faster than usual   Contact your healthcare provider if:   · You have a fever  · You have nausea or are vomiting for more than 8 hours after the procedure  · Your skin is itchy, swollen, or you have a rash  · You have questions or concerns about your condition or care  Self-care:   · Have someone stay with you for 24 hours  This person can drive you to errands and help you do things around the house  This person can also watch for problems  · Rest and do quiet activities for 24 hours  Do not exercise, ride a bike, or play sports  Stand up slowly to prevent dizziness and falls  Take short walks around the house with another person  Slowly return to your usual activities the next day  · Do not drive or use dangerous machines or tools for 24 hours  You may injure yourself or others  Examples include a lawnmower, saw, or drill  Do not return to work for 24 hours if you use dangerous machines or tools for work  · Do not make important decisions for 24 hours  For example, do not sign important papers or invest money  · Drink liquids as directed  Liquids help flush the sedation medicine out of your body   Ask how much liquid to drink each day and which liquids are best for you  · Eat small, frequent meals to prevent nausea and vomiting  Start with clear liquids such as juice or broth  If you do not vomit after clear liquids, you can eat your usual foods  · Do not drink alcohol or take medicines that make you drowsy  This includes medicines that help you sleep and anxiety medicines  Ask your healthcare provider if it is safe for you to take pain medicine  Follow up with your healthcare provider as directed:  Write down your questions so you remember to ask them during your visits  © 2017 [employer] Information is for End User's use only and may not be sold, redistributed or otherwise used for commercial purposes  All illustrations and images included in CareNotes® are the copyrighted property of [employer] , [employer]  or Karl Ledesma  The above information is an  only  It is not intended as medical advice for individual conditions or treatments  Talk to your doctor, nurse or pharmacist before following any medical regimen to see if it is safe and effective for you

## 2018-04-03 NOTE — H&P
H&P EXAM - Outpatient Endoscopy   Won Duenas 68 y o  female MRN: [id number]    [address]  [facility] ENDOSCOPY   Encounter: [id number]        Impression:  Abdominal pain    Plan:  Colonoscopy and upper endoscopy    Chief Complaint:  Abdominal pain    Physical Exam:  No acute distress, alert and oriented   Chest:  Clear   Heart:  Regular

## 2018-04-03 NOTE — ANESTHESIA PREPROCEDURE EVALUATION
Review of Systems/Medical History    Chart reviewed  No history of anesthetic complications     Cardiovascular  Hypertension controlled,    Pulmonary  Negative pulmonary ROS        GI/Hepatic    Bowel prep  Comment: constipation     Negative  ROS        Endo/Other  History of thyroid disease , hypothyroidism,   Obesity (BMI 30)    GYN  Negative gynecology ROS          Hematology  Anemia iron deficiency anemia,     Musculoskeletal  Back pain ,   Arthritis     Neurology   Psychology   Anxiety, Depression ,     Comment: fibromyalgia         Physical Exam    Airway    Mallampati score: II  TM Distance: >3 FB  Neck ROM: full     Dental   upper dentures and lower dentures,     Cardiovascular      Pulmonary      Other Findings        Anesthesia Plan  ASA Score- 3     Anesthesia Type- IV sedation with anesthesia with ASA Monitors  Additional Monitors:   Airway Plan:         Plan Factors- Instructed to abstain from smoking on day of procedure: nonsmoker    Patient smoked on day of surgery: nonsmoker       Induction- intravenous  Postoperative Plan-     Informed Consent- Anesthetic plan and risks discussed with patient

## 2018-04-03 NOTE — ANESTHESIA POSTPROCEDURE EVALUATION
Post-Op Assessment Note      CV Status:  Stable    Mental Status:  Alert and awake    Hydration Status:  Stable    PONV Controlled:  None    Airway Patency:  Patent  Airway: intubated    Post Op Vitals Reviewed: Yes          Staff: Anesthesiologist           /77 (04/03/18 1359)    Temp     Pulse 64 (04/03/18 1359)   Resp 18 (04/03/18 1359)    SpO2 98 % (04/03/18 1359)

## 2018-04-04 ENCOUNTER — OFFICE VISIT (OUTPATIENT)
Dept: FAMILY MEDICINE CLINIC | Facility: CLINIC | Age: 78
End: 2018-04-04
Payer: MEDICARE

## 2018-04-04 ENCOUNTER — TELEPHONE (OUTPATIENT)
Dept: FAMILY MEDICINE CLINIC | Facility: CLINIC | Age: 78
End: 2018-04-04

## 2018-04-04 VITALS
BODY MASS INDEX: 29.29 KG/M2 | SYSTOLIC BLOOD PRESSURE: 130 MMHG | HEART RATE: 72 BPM | RESPIRATION RATE: 16 BRPM | OXYGEN SATURATION: 94 % | WEIGHT: 150 LBS | DIASTOLIC BLOOD PRESSURE: 80 MMHG

## 2018-04-04 DIAGNOSIS — M79.7 FIBROMYALGIA: ICD-10-CM

## 2018-04-04 DIAGNOSIS — F41.9 ANXIETY: ICD-10-CM

## 2018-04-04 DIAGNOSIS — D50.9 IRON DEFICIENCY ANEMIA, UNSPECIFIED IRON DEFICIENCY ANEMIA TYPE: Primary | ICD-10-CM

## 2018-04-04 DIAGNOSIS — R10.84 GENERALIZED ABDOMINAL PAIN: ICD-10-CM

## 2018-04-04 LAB
5OH-INDOLEACETATE 24H UR-MCNC: 2.3 MG/L
5OH-INDOLEACETATE 24H UR-MRATE: 5.4 MG/24 HR (ref 0–14.9)

## 2018-04-04 PROCEDURE — 99214 OFFICE O/P EST MOD 30 MIN: CPT | Performed by: FAMILY MEDICINE

## 2018-04-04 RX ORDER — HYDROCODONE BITARTRATE AND ACETAMINOPHEN 7.5; 3 MG/1; MG/1
1 TABLET ORAL EVERY 6 HOURS PRN
Qty: 45 TABLET | Refills: 0 | Status: SHIPPED | OUTPATIENT
Start: 2018-04-04 | End: 2018-05-02 | Stop reason: SDUPTHER

## 2018-04-04 RX ORDER — ALPRAZOLAM 0.5 MG/1
0.5 TABLET ORAL 3 TIMES DAILY PRN
Qty: 30 TABLET | Refills: 0 | Status: SHIPPED | OUTPATIENT
Start: 2018-04-04 | End: 2018-04-20 | Stop reason: SDUPTHER

## 2018-04-04 NOTE — PROGRESS NOTES
Assessment/Plan:    No problem-specific Assessment & Plan notes found for this encounter  There are no diagnoses linked to this encounter  Subjective:      Patient ID: Carl Francisco is a 68 y o  female  This is a 66-year-old white female who is following up for her history of iron deficiency anemia  The patient recently had blood work to check her iron and ferritin levels  The patient also recently had a colonoscopy and EGD done by Dr Digna Howard to follow up her complaints of epigastric pain  The results of the colonoscopy and EGD are pending at this point  Dr Bower Files also order other blood work to rule out inflammatory bowel disease  The patient has a follow-up appoint with Dr  well as on this Friday to review these results  Patient does have complaints of a burning sensation in her epigastric region  The patient is on a PPI will continue the PPI until she sees Dr Digna Howard  The patient is requesting a refill on her anxiety and pain medication tonight  The following portions of the patient's history were reviewed and updated as appropriate: allergies, current medications, past family history, past medical history, past social history, past surgical history and problem list     Review of Systems   Constitutional: Negative for fatigue and fever  HENT: Negative  Eyes: Negative  Respiratory: Negative  Cardiovascular: Negative  Gastrointestinal: Positive for abdominal distention, abdominal pain and rectal pain  Endocrine: Negative  Genitourinary: Negative  Musculoskeletal: Positive for arthralgias  Skin: Negative  Neurological: Negative  Psychiatric/Behavioral: The patient is nervous/anxious  Objective:      /80   Pulse 72   Resp 16   Wt 68 kg (150 lb)   SpO2 94%   BMI 29 29 kg/m²          Physical Exam   Constitutional: She is oriented to person, place, and time  She appears well-developed  Patient is a BMI 29 29 is overweight     Cardiovascular: Normal rate and regular rhythm  Pulmonary/Chest: Effort normal and breath sounds normal    Musculoskeletal:   The patient uses a cane to ambulate  Neurological: She is alert and oriented to person, place, and time  Psychiatric: She has a normal mood and affect  Her behavior is normal  Judgment and thought content normal    Vitals reviewed

## 2018-04-04 NOTE — TELEPHONE ENCOUNTER
Dr Kang Sauer - PT HAS APPT TONIGHT, HOWEVER SHE MIGHT CANCEL It   IF SHE CANCELS IT SHE NEEDS A REFILL FOR ALPRAZOLAM CALLED INTO [facility] [facility]  SHE HAS 2 LEFT AND WOULD LIKE IT TODAY

## 2018-04-04 NOTE — PATIENT INSTRUCTIONS
The patient has iron deficiency anemia has improved with iron supplementation  Her iron and ferritin levels also improved  The patient will continue her iron supplementation for least another month  A repeat iron ferritin and CBC are ordered for 1 month  The patient's antianxiety and pain medications reordered  The patient will keep her follow-up appointment with her GI doctor on Friday review her colonoscopy and EGD biopsy results  The patient will also review the blood work ordered by her GI doctor  The patient will follow up with me in 1 month for her iron deficiency anemia

## 2018-04-05 ENCOUNTER — TELEPHONE (OUTPATIENT)
Dept: FAMILY MEDICINE CLINIC | Facility: CLINIC | Age: 78
End: 2018-04-05

## 2018-04-05 NOTE — TELEPHONE ENCOUNTER
Dr KAUFMAN - PT WOULD LIKE BW ORDERS FOR MAGNESIUM AND VITAMIN D TO GO ALONG WITH THE OTHER BW ORDERS YOU DID FOR HER  SHE WOULD LIKE CALL BACK  SCD boots   contineu protonix and colace resolved  continue to monitor   amio restarted for ectopy (with NSVT vs afib)> caused nausea> changed to daily, should consider changing to low dose lopressor if nausea recurs. resolved  continue to monitor   amio restarted for ectopy (with NSVT vs afib)> caused nausea> changed to daily, should consider changing to low dose lopressor if nausea recurs. resolved  continue to monitor   amio restarted for ectopy (with NSVT vs afib)> caused nausea> changed to daily, should consider changing to low dose lopressor if nausea recurs. SCD boots   contineu protonix and colace

## 2018-04-13 DIAGNOSIS — F41.9 ANXIETY: ICD-10-CM

## 2018-04-13 NOTE — TELEPHONE ENCOUNTER
Pt said alpazolam is back in the formulary and would like a 90 day supply sent to [facility] pharmacy if possible,

## 2018-04-16 ENCOUNTER — TELEPHONE (OUTPATIENT)
Dept: FAMILY MEDICINE CLINIC | Facility: CLINIC | Age: 78
End: 2018-04-16

## 2018-04-16 RX ORDER — ALPRAZOLAM 0.5 MG/1
0.5 TABLET ORAL 3 TIMES DAILY PRN
Qty: 30 TABLET | Refills: 0 | Status: CANCELLED | OUTPATIENT
Start: 2018-04-16

## 2018-04-16 NOTE — TELEPHONE ENCOUNTER
Pt lara bc she said now her xanax will need prior auth if dr Yogi Segura orders it      [phone] - phone

## 2018-04-19 DIAGNOSIS — F41.9 ANXIETY: ICD-10-CM

## 2018-04-19 RX ORDER — ALPRAZOLAM 0.5 MG/1
0.5 TABLET ORAL 3 TIMES DAILY PRN
Qty: 30 TABLET | Refills: 0 | Status: CANCELLED | OUTPATIENT
Start: 2018-04-19

## 2018-04-19 NOTE — TELEPHONE ENCOUNTER
Dr Karina Day  SEE PREVIOUS MESSAGES   PT'S DAUGHTER WOULD LIKE TO PICK IT UP AT THE PHARM AROUND 5:30 TODAY

## 2018-04-19 NOTE — TELEPHONE ENCOUNTER
Dr Guillermina Gabriel  SHE HAS BEEN OUT SINCE YESTERDAY  SHE WANTS 90 DAY SUPPLY  CALL INTO  [facility]  SHE WANTS A CALL WHEN THIS IS DONE

## 2018-04-20 DIAGNOSIS — F41.9 ANXIETY: ICD-10-CM

## 2018-04-20 RX ORDER — ALPRAZOLAM 0.5 MG/1
0.5 TABLET ORAL 3 TIMES DAILY PRN
Qty: 30 TABLET | Refills: 0 | Status: SHIPPED | OUTPATIENT
Start: 2018-04-20 | End: 2018-04-20 | Stop reason: SDUPTHER

## 2018-04-20 RX ORDER — ALPRAZOLAM 0.5 MG/1
0.5 TABLET ORAL 3 TIMES DAILY PRN
Qty: 90 TABLET | Refills: 0 | Status: SHIPPED | OUTPATIENT
Start: 2018-04-20 | End: 2018-05-30 | Stop reason: SDUPTHER

## 2018-04-24 ENCOUNTER — APPOINTMENT (OUTPATIENT)
Dept: LAB | Facility: CLINIC | Age: 78
End: 2018-04-24
Payer: MEDICARE

## 2018-04-24 ENCOUNTER — TRANSCRIBE ORDERS (OUTPATIENT)
Dept: LAB | Facility: CLINIC | Age: 78
End: 2018-04-24

## 2018-04-24 ENCOUNTER — TELEPHONE (OUTPATIENT)
Dept: PAIN MEDICINE | Facility: CLINIC | Age: 78
End: 2018-04-24

## 2018-04-24 DIAGNOSIS — R10.32 ABDOMINAL PAIN, LEFT LOWER QUADRANT: Primary | ICD-10-CM

## 2018-04-24 DIAGNOSIS — D50.9 IRON DEFICIENCY ANEMIA, UNSPECIFIED IRON DEFICIENCY ANEMIA TYPE: ICD-10-CM

## 2018-04-24 LAB
BASOPHILS # BLD AUTO: 0.03 THOUSANDS/ΜL (ref 0–0.1)
BASOPHILS NFR BLD AUTO: 1 % (ref 0–1)
EOSINOPHIL # BLD AUTO: 0.09 THOUSAND/ΜL (ref 0–0.61)
EOSINOPHIL NFR BLD AUTO: 2 % (ref 0–6)
ERYTHROCYTE [DISTWIDTH] IN BLOOD BY AUTOMATED COUNT: 15.7 % (ref 11.6–15.1)
FERRITIN SERPL-MCNC: 19 NG/ML (ref 8–388)
HCT VFR BLD AUTO: 36.1 % (ref 34.8–46.1)
HGB BLD-MCNC: 11.6 G/DL (ref 11.5–15.4)
IRON SERPL-MCNC: 78 UG/DL (ref 50–170)
LYMPHOCYTES # BLD AUTO: 0.88 THOUSANDS/ΜL (ref 0.6–4.47)
LYMPHOCYTES NFR BLD AUTO: 16 % (ref 14–44)
MCH RBC QN AUTO: 28.2 PG (ref 26.8–34.3)
MCHC RBC AUTO-ENTMCNC: 32.1 G/DL (ref 31.4–37.4)
MCV RBC AUTO: 88 FL (ref 82–98)
MONOCYTES # BLD AUTO: 0.56 THOUSAND/ΜL (ref 0.17–1.22)
MONOCYTES NFR BLD AUTO: 10 % (ref 4–12)
NEUTROPHILS # BLD AUTO: 4.09 THOUSANDS/ΜL (ref 1.85–7.62)
NEUTS SEG NFR BLD AUTO: 71 % (ref 43–75)
NRBC BLD AUTO-RTO: 0 /100 WBCS
PLATELET # BLD AUTO: 236 THOUSANDS/UL (ref 149–390)
PMV BLD AUTO: 10.7 FL (ref 8.9–12.7)
RBC # BLD AUTO: 4.11 MILLION/UL (ref 3.81–5.12)
WBC # BLD AUTO: 5.66 THOUSAND/UL (ref 4.31–10.16)

## 2018-04-24 PROCEDURE — 85025 COMPLETE CBC W/AUTO DIFF WBC: CPT

## 2018-04-24 PROCEDURE — 36415 COLL VENOUS BLD VENIPUNCTURE: CPT

## 2018-04-24 PROCEDURE — 83540 ASSAY OF IRON: CPT

## 2018-04-24 PROCEDURE — 86803 HEPATITIS C AB TEST: CPT

## 2018-04-24 PROCEDURE — 82728 ASSAY OF FERRITIN: CPT

## 2018-04-25 LAB — HCV AB SER QL: NORMAL

## 2018-05-02 ENCOUNTER — OFFICE VISIT (OUTPATIENT)
Dept: FAMILY MEDICINE CLINIC | Facility: CLINIC | Age: 78
End: 2018-05-02
Payer: MEDICARE

## 2018-05-02 VITALS
TEMPERATURE: 99 F | DIASTOLIC BLOOD PRESSURE: 70 MMHG | RESPIRATION RATE: 18 BRPM | SYSTOLIC BLOOD PRESSURE: 110 MMHG | WEIGHT: 150 LBS | HEIGHT: 60 IN | BODY MASS INDEX: 29.45 KG/M2 | HEART RATE: 89 BPM | OXYGEN SATURATION: 98 %

## 2018-05-02 DIAGNOSIS — D50.8 IRON DEFICIENCY ANEMIA SECONDARY TO INADEQUATE DIETARY IRON INTAKE: Primary | ICD-10-CM

## 2018-05-02 DIAGNOSIS — K52.9 INFLAMMATORY BOWEL DISEASE: ICD-10-CM

## 2018-05-02 DIAGNOSIS — G89.4 CHRONIC PAIN SYNDROME: ICD-10-CM

## 2018-05-02 DIAGNOSIS — M79.7 FIBROMYALGIA: ICD-10-CM

## 2018-05-02 DIAGNOSIS — E83.42 HYPOMAGNESEMIA: ICD-10-CM

## 2018-05-02 DIAGNOSIS — E03.9 ACQUIRED HYPOTHYROIDISM: ICD-10-CM

## 2018-05-02 DIAGNOSIS — F33.1 MODERATE EPISODE OF RECURRENT MAJOR DEPRESSIVE DISORDER (HCC): ICD-10-CM

## 2018-05-02 PROCEDURE — 99214 OFFICE O/P EST MOD 30 MIN: CPT | Performed by: FAMILY MEDICINE

## 2018-05-02 RX ORDER — DIVALPROEX SODIUM 125 MG/1
125 TABLET, DELAYED RELEASE ORAL
COMMUNITY
Start: 2018-05-01

## 2018-05-02 RX ORDER — MESALAMINE 4 G/60ML
ENEMA RECTAL
COMMUNITY
Start: 2018-04-16 | End: 2018-09-13

## 2018-05-02 RX ORDER — MESALAMINE 1.2 G/1
2400 TABLET, DELAYED RELEASE ORAL DAILY
COMMUNITY
Start: 2018-04-16 | End: 2019-02-06 | Stop reason: ALTCHOICE

## 2018-05-02 RX ORDER — RISPERIDONE 0.25 MG/1
TABLET, FILM COATED ORAL
COMMUNITY
Start: 2018-05-01 | End: 2018-09-13

## 2018-05-02 RX ORDER — HYDROCODONE BITARTRATE AND ACETAMINOPHEN 7.5; 3 MG/1; MG/1
1 TABLET ORAL EVERY 6 HOURS PRN
Qty: 45 TABLET | Refills: 0 | Status: SHIPPED | OUTPATIENT
Start: 2018-05-02 | End: 2018-05-29 | Stop reason: SDUPTHER

## 2018-05-02 NOTE — PROGRESS NOTES
Assessment/Plan:    No problem-specific Assessment & Plan notes found for this encounter  Diagnoses and all orders for this visit:    Iron deficiency anemia secondary to inadequate dietary iron intake  -     CBC and differential; Future  -     Ferritin; Future  -     Iron; Future    Inflammatory bowel disease  -     Magnesium; Future    Acquired hypothyroidism  -     TSH, 3rd generation with T4 reflex; Future  -     Vitamin D 25 hydroxy; Future  -     Comprehensive metabolic panel; Future    Moderate episode of recurrent major depressive disorder (HCC)    Hypomagnesemia    Fibromyalgia  -     HYDROcodone-acetaminophen (XODOL) 7 5-300 MG per tablet; Take 1 tablet by mouth every 6 (six) hours as needed for moderate pain Please cancel Hydrocodone 7 5/ Ibuprofen  200 mgrx Max Daily Amount: 4 tablets    Other orders  -     divalproex sodium (DEPAKOTE) 125 mg EC tablet;   -     LIALDA 1 2 g EC tablet;   -     mesalamine (ROWASA) 4 g;   -     risperiDONE (RisperDAL) 0 25 mg tablet;         Subjective:      Patient ID: Jad Mandaen is a 68 y o  female  This is a 68 year female who is following up for chronic medical problems that include anemia, abdominal pain, fibromyalgia, hypothyroidism, and depression  She had recent blood work which will be reviewed tonight  Patient had a recent EGD and colonoscopy which diagnosed gastritis and chronic sigmoiditis  Patient was given a diagnosis of inflammatory bowel disease and started on medications her GI physician  The patient was also recently restarted on Depakote for her depression her psychiatrist         The following portions of the patient's history were reviewed and updated as appropriate: allergies, current medications, past family history, past medical history, past social history, past surgical history and problem list     Review of Systems   Constitutional: Positive for chills and fatigue  Negative for fever  Respiratory: Negative      Cardiovascular: Negative  Gastrointestinal: Positive for abdominal distention, abdominal pain and rectal pain  Musculoskeletal: Positive for arthralgias  Skin: Negative  Objective:      /70 (BP Location: Left arm, Patient Position: Sitting, Cuff Size: Large)   Pulse 89   Temp 99 °F (37 2 °C) (Tympanic)   Resp 18   Ht 5' (1 524 m)   Wt 68 kg (150 lb)   SpO2 98%   BMI 29 29 kg/m²          Physical Exam   Constitutional: She is oriented to person, place, and time  She appears well-developed and well-nourished  Patient is overweight with a BMI of 29 29   Cardiovascular: Normal rate, regular rhythm, normal heart sounds and intact distal pulses  Pulmonary/Chest: Effort normal and breath sounds normal    Musculoskeletal:   Patient uses a cane to ambulate   Neurological: She is alert and oriented to person, place, and time     Psychiatric: Judgment and thought content normal    Patient a somewhat depressed affect

## 2018-05-03 NOTE — PATIENT INSTRUCTIONS
The patient's anemia has improved with iron supplementation  Iron and ferritin levels of also improved  Patient will continue her iron supplementation  Repeat iron ferritin and CBC were ordered for 1 month  Patient has a history of vitamin D deficiency and a repeat level was ordered in 1 month  The patient also has a history of hypothyroidism and a repeat TSH level was ordered month  She also has a history of hypo magnesium and a repeat magnesium level was ordered month  Her pain meds were ordered for her pain syndrome  Patient was advised to follow up with me in 1 month to review her blood work

## 2018-05-14 ENCOUNTER — TELEPHONE (OUTPATIENT)
Dept: FAMILY MEDICINE CLINIC | Facility: CLINIC | Age: 78
End: 2018-05-14

## 2018-05-14 DIAGNOSIS — G89.4 CHRONIC PAIN SYNDROME: Primary | ICD-10-CM

## 2018-05-17 ENCOUNTER — TELEPHONE (OUTPATIENT)
Dept: PAIN MEDICINE | Facility: CLINIC | Age: 78
End: 2018-05-17

## 2018-05-17 NOTE — TELEPHONE ENCOUNTER
Received physician order from Dr Lakesha Yang office called patient and scheduled consult with Dr Gerianne Goltz  New patient paperwork mailed on 05/17/18

## 2018-05-29 ENCOUNTER — OFFICE VISIT (OUTPATIENT)
Dept: PAIN MEDICINE | Facility: CLINIC | Age: 78
End: 2018-05-29
Payer: MEDICARE

## 2018-05-29 ENCOUNTER — TRANSCRIBE ORDERS (OUTPATIENT)
Dept: LAB | Facility: CLINIC | Age: 78
End: 2018-05-29

## 2018-05-29 ENCOUNTER — APPOINTMENT (OUTPATIENT)
Dept: LAB | Facility: CLINIC | Age: 78
End: 2018-05-29
Payer: MEDICARE

## 2018-05-29 VITALS
DIASTOLIC BLOOD PRESSURE: 66 MMHG | BODY MASS INDEX: 29.68 KG/M2 | HEART RATE: 63 BPM | TEMPERATURE: 98.6 F | SYSTOLIC BLOOD PRESSURE: 124 MMHG | RESPIRATION RATE: 18 BRPM | HEIGHT: 60 IN | WEIGHT: 151.2 LBS

## 2018-05-29 DIAGNOSIS — K52.9 INFLAMMATORY BOWEL DISEASE: ICD-10-CM

## 2018-05-29 DIAGNOSIS — G89.4 CHRONIC PAIN SYNDROME: ICD-10-CM

## 2018-05-29 DIAGNOSIS — R52 GENERALIZED PAIN: ICD-10-CM

## 2018-05-29 DIAGNOSIS — K43.9 HERNIA OF ABDOMINAL WALL: Primary | ICD-10-CM

## 2018-05-29 DIAGNOSIS — E03.9 ACQUIRED HYPOTHYROIDISM: ICD-10-CM

## 2018-05-29 DIAGNOSIS — D50.8 IRON DEFICIENCY ANEMIA SECONDARY TO INADEQUATE DIETARY IRON INTAKE: ICD-10-CM

## 2018-05-29 DIAGNOSIS — R10.84 GENERALIZED ABDOMINAL PAIN: ICD-10-CM

## 2018-05-29 DIAGNOSIS — M79.7 FIBROMYALGIA: ICD-10-CM

## 2018-05-29 LAB
25(OH)D3 SERPL-MCNC: 17.4 NG/ML (ref 30–100)
ALBUMIN SERPL BCP-MCNC: 3.8 G/DL (ref 3.5–5)
ALP SERPL-CCNC: 64 U/L (ref 46–116)
ALT SERPL W P-5'-P-CCNC: 15 U/L (ref 12–78)
ANION GAP SERPL CALCULATED.3IONS-SCNC: 10 MMOL/L (ref 4–13)
AST SERPL W P-5'-P-CCNC: 18 U/L (ref 5–45)
BASOPHILS # BLD AUTO: 0.05 THOUSANDS/ΜL (ref 0–0.1)
BASOPHILS NFR BLD AUTO: 1 % (ref 0–1)
BILIRUB SERPL-MCNC: 0.47 MG/DL (ref 0.2–1)
BUN SERPL-MCNC: 12 MG/DL (ref 5–25)
CALCIUM SERPL-MCNC: 9.5 MG/DL (ref 8.3–10.1)
CHLORIDE SERPL-SCNC: 104 MMOL/L (ref 100–108)
CO2 SERPL-SCNC: 25 MMOL/L (ref 21–32)
CREAT SERPL-MCNC: 0.8 MG/DL (ref 0.6–1.3)
EOSINOPHIL # BLD AUTO: 0.19 THOUSAND/ΜL (ref 0–0.61)
EOSINOPHIL NFR BLD AUTO: 4 % (ref 0–6)
ERYTHROCYTE [DISTWIDTH] IN BLOOD BY AUTOMATED COUNT: 15 % (ref 11.6–15.1)
FERRITIN SERPL-MCNC: 38 NG/ML (ref 8–388)
GFR SERPL CREATININE-BSD FRML MDRD: 71 ML/MIN/1.73SQ M
GLUCOSE P FAST SERPL-MCNC: 86 MG/DL (ref 65–99)
HCT VFR BLD AUTO: 36.7 % (ref 34.8–46.1)
HGB BLD-MCNC: 11.4 G/DL (ref 11.5–15.4)
IRON SERPL-MCNC: 75 UG/DL (ref 50–170)
LYMPHOCYTES # BLD AUTO: 1.09 THOUSANDS/ΜL (ref 0.6–4.47)
LYMPHOCYTES NFR BLD AUTO: 22 % (ref 14–44)
MAGNESIUM SERPL-MCNC: 2.3 MG/DL (ref 1.6–2.6)
MCH RBC QN AUTO: 28.6 PG (ref 26.8–34.3)
MCHC RBC AUTO-ENTMCNC: 31.1 G/DL (ref 31.4–37.4)
MCV RBC AUTO: 92 FL (ref 82–98)
MONOCYTES # BLD AUTO: 0.63 THOUSAND/ΜL (ref 0.17–1.22)
MONOCYTES NFR BLD AUTO: 13 % (ref 4–12)
NEUTROPHILS # BLD AUTO: 2.87 THOUSANDS/ΜL (ref 1.85–7.62)
NEUTS SEG NFR BLD AUTO: 59 % (ref 43–75)
NRBC BLD AUTO-RTO: 0 /100 WBCS
PLATELET # BLD AUTO: 250 THOUSANDS/UL (ref 149–390)
PMV BLD AUTO: 10.5 FL (ref 8.9–12.7)
POTASSIUM SERPL-SCNC: 4.4 MMOL/L (ref 3.5–5.3)
PROT SERPL-MCNC: 7.6 G/DL (ref 6.4–8.2)
RBC # BLD AUTO: 3.98 MILLION/UL (ref 3.81–5.12)
SODIUM SERPL-SCNC: 139 MMOL/L (ref 136–145)
TSH SERPL DL<=0.05 MIU/L-ACNC: 1.78 UIU/ML (ref 0.36–3.74)
WBC # BLD AUTO: 4.86 THOUSAND/UL (ref 4.31–10.16)

## 2018-05-29 PROCEDURE — 83540 ASSAY OF IRON: CPT

## 2018-05-29 PROCEDURE — 82306 VITAMIN D 25 HYDROXY: CPT

## 2018-05-29 PROCEDURE — 80053 COMPREHEN METABOLIC PANEL: CPT

## 2018-05-29 PROCEDURE — 36415 COLL VENOUS BLD VENIPUNCTURE: CPT

## 2018-05-29 PROCEDURE — 83735 ASSAY OF MAGNESIUM: CPT

## 2018-05-29 PROCEDURE — 85025 COMPLETE CBC W/AUTO DIFF WBC: CPT

## 2018-05-29 PROCEDURE — 82728 ASSAY OF FERRITIN: CPT

## 2018-05-29 PROCEDURE — 84443 ASSAY THYROID STIM HORMONE: CPT

## 2018-05-29 PROCEDURE — 99204 OFFICE O/P NEW MOD 45 MIN: CPT | Performed by: ANESTHESIOLOGY

## 2018-05-29 NOTE — LETTER
May 29, 2018     Sampson Clemens, [address]    Patient: Jason Ga   YOB: 1940   Date of Visit: 5/29/2018       Dear Dr Willie Chacon: Thank you for referring Danae Davis to me for evaluation  Below are my notes for this consultation  If you have questions, please do not hesitate to call me  I look forward to following your patient along with you  Sincerely,        Tonia Villela MD        CC: No Recipients  Tonia Villela MD  5/29/2018  2:59 PM  Sign at close encounter  Assessment:  1  Hernia of abdominal wall    2  Chronic pain syndrome    3  Generalized abdominal pain    4  Generalized pain        Plan:  My impressions and treatment recommendations were discussed in detail with the patient, who verbalized understanding and had no further questions  The patient is complaining of chronic left lower quadrant pain from multiple hernia surgeries  I did offer the patient a iliohypogastric nerve block with steroid and local anesthetic, but she was not interested in this at this time  She states that she would like to [facility] about it before proceeding  The in addition, the patient is complaining of generalized body pain  She reports that she has diagnosis of fibromyalgia, but has not seen a rheumatologist in several years  I offered the patient to get another opinion from another rheumatologist, Dr Whitney Grant, but she reported that she would like to [facility] about it      In addition, the patient was complaining of low back pain and lower extremity radicular symptoms  I did mention to the patient that I could perform interventional pain procedures for this, but she was not interested  Follow-up is planned with the patient on an as-needed basis  Discharge instructions were provided  I personally saw and examined the patient and I agree with the above discussed plan of care      History of Present Illness:    Jason Ga is a 68 y o  female who presents to [facility] for initial evaluation of the above stated pain complaints  The patient has a past medical and chronic pain history as outlined in the assessment section  She was referred by Dr Izaiah Barakat  The patient reports a several year history of left lower quadrant abdominal pain  She reports that she has in a repair wall hernia of the left lower quadrant that continues to bother her  She is also complaining of generalized pain throughout her whole body and states that she carries a diagnosis of fibromyalgia  She is also complaining of low back pain with lower extremity radicular symptoms  The patient states that her pain is severe in 10/10 on the verbal numerical pain rating scale  The patient states that her pain is constant in nature without any typical pattern  He describes the pain as "burning, cramping, numbness, sharp, dull/aching, pressure like, and throbbing    The patient reports weakness in her lower extremities  She does report that prior, lying down, and relaxation decreases pain  Standing, bending, sitting, walking, exercise, coughing/sneezing, and bowel movements increases pain  The patient does report that there are no pain relieving factors  Of multiple surgeries of the left lower quadrant to repair the hernia have provided moderate relief  She also reports that nerve block/injections, heat/ice treatment, chiropractic manipulation, and osteopathic manipulation all provided moderate pain relief  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     Review of Systems:    Review of Systems   Constitutional: Positive for fever and unexpected weight change  Eyes: Positive for visual disturbance  Respiratory: Positive for shortness of breath  Cardiovascular: Positive for palpitations  Negative for chest pain  Gastrointestinal: Positive for constipation, diarrhea and nausea  Negative for vomiting     Genitourinary: Positive for difficulty urinating  Musculoskeletal: Positive for gait problem (difficulty walking/ decreased range of motion) and joint swelling  Negative for arthralgias and myalgias  Skin: Negative for rash  Neurological: Positive for dizziness and weakness (muscle weakness)  Negative for seizures  Psychiatric/Behavioral: Positive for dysphoric mood (depression)  All other systems reviewed and are negative          Patient Active Problem List   Diagnosis    Iron deficiency anemia secondary to inadequate dietary iron intake    Dyspnea on exertion    Iron deficiency anemia    Moderate episode of recurrent major depressive disorder (HCC)    Acquired hypothyroidism    Anxiety    Generalized abdominal pain    Inflammatory bowel disease    Hypomagnesemia    Fibromyalgia    Chronic pain syndrome    Hernia of abdominal wall    Generalized pain       Past Medical History:   Diagnosis Date    Acute hyperglycemia     Anxiety     Arthritis     Vickie-Dawson    Benign polyp of large intestine     Cancer ([facility] [address] ) 2010    breast    Cat-scratch disease     Connective tissue disease ([facility] [address] )     DDD (degenerative disc disease), lumbar     Depression     Disease of thyroid gland     hypo    Diverticulitis large intestine     Feared complaint without diagnosis     last assessed: 1/16/17    Fibromyalgia     Full dentures     upper and lower    Genital warts     Hemorrhoids     Herniated lumbar intervertebral disc     History of transfusion     s/p diverticultits perf  emergency surgery    Hypertension     Irritable bowel syndrome     Lyme disease 2010    neurological residuals, feels ill daily, exhausted    Lymphedema     Rectocele     last assessed: 11/13/12    Shortness of breath     exertional    Skin pruritus     last assessed: 1/7/17    Spinal stenosis, lumbar     Urinary frequency     last assessed: 7/21/15    Wears glasses        Past Surgical History:   Procedure Laterality Date    APPENDECTOMY      during bowel surgery    BREAST SURGERY Left     CA in duct, needle bx     SECTION      CHOLECYSTECTOMY      open    COLON SURGERY      emergency perf diverticulitis, colostomy    COLON SURGERY      reversal of colostomy post rupture left side    COLONOSCOPY      3/1/11 Dr Emanuel Cleary: flexible sigmoidoscopy-ileosigmoid anastomotic ulcer (active mucositis with suppurative inflammatory exudate treated with Asacol and retention enema  )  08 Dr Talbot for RLQ abdominal pain: diverticulosis, otherwise normal rectum  Pain likely due to adhesions  06 Dr Emanuel Cleary for abdominal Pain: sigmoid hyperplastic polyp, f/u 3 years   ESOPHAGOGASTRODUODENOSCOPY N/A 2016    Procedure: ESOPHAGOGASTRODUODENOSCOPY (EGD) and biopsy, Flex Sig;  Surgeon: Anup Thomson MD;  Location: [facility]; Service:     EYE SURGERY Bilateral 2010    cataract with IOL    HERNIA REPAIR      x2 abdominal, x1 incisional    HYSTERECTOMY      age 29 post fall/ trauma/ emergency    LUMBAR EPIDURAL INJECTION      x4 L1-5    MASTECTOMY, RADICAL Bilateral     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, 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, no radiation or chemo    [place] COLSC FLX W/RMVL OF TUMOR POLYP LESION SNARE TQ N/A 4/3/2018    Procedure: COLONOSCOPY;  Surgeon: Stephanie Arias MD;  Location: [facility]; Service: Gastroenterology    [place] EGD TRANSORAL BIOPSY SINGLE/MULTIPLE N/A 4/3/2018    Procedure: ESOPHAGOGASTRODUODENOSCOPY (EGD); Surgeon: Stephanie Arias MD;  Location: [facility];   Service: Gastroenterology    RESECTION TONSIL RADICAL         Family History   Problem Relation Age of Onset    Heart disease Mother     Diverticulosis Mother     Emphysema Father     Cancer Sister      lung and breast, carcinoma of the pancreas    Heart disease Sister     Parkinsonism Brother     Lung cancer Brother      late 42's       Social History     Occupational History    Retired      Social History Main Topics    Smoking status: Never Smoker    Smokeless tobacco: Never Used    Alcohol use Yes      Comment: Social    Drug use: No    Sexual activity: Not on file         Current Outpatient Prescriptions:     ALPRAZolam (XANAX) 0 5 mg tablet, Take 1 tablet (0 5 mg total) by mouth 3 (three) times a day as needed for anxiety, Disp: 90 tablet, Rfl: 0    Cholecalciferol (VITAMIN D-3 PO), Take 1 tablet by mouth every evening , Disp: , Rfl:     dexlansoprazole (DEXILANT) 60 MG capsule, Take 60 mg by mouth every morning , Disp: , Rfl:     divalproex sodium (DEPAKOTE) 125 mg EC tablet, , Disp: , Rfl:     docusate sodium (COLACE) 100 mg capsule, Take 100 mg by mouth daily at bedtime  , Disp: , Rfl:     HYDROcodone-acetaminophen (XODOL) 7 5-300 MG per tablet, Take 1 tablet by mouth every 6 (six) hours as needed for moderate pain Please cancel Hydrocodone 7 5/ Ibuprofen  200 mgrx Max Daily Amount: 4 tablets, Disp: 45 tablet, Rfl: 0    levothyroxine 25 mcg tablet, TAKE 1 TABLET DAILY, Disp: 90 tablet, Rfl: 4    LIALDA 1 2 g EC tablet, , Disp: , Rfl:     mesalamine (ROWASA) 4 g, , Disp: , Rfl:     risperiDONE (RisperDAL) 0 25 mg tablet, , Disp: , Rfl:     valsartan (DIOVAN) 80 mg tablet, Take 80 mg by mouth every morning , Disp: , Rfl:     venlafaxine (EFFEXOR-XR) 75 mg 24 hr capsule, every morning  , Disp: , Rfl:     Allergies   Allergen Reactions    Duricef [Cefadroxil] GI Intolerance    Latex Rash    Sulfa Antibiotics Rash       Physical Exam:    /66 (BP Location: Right arm, Patient Position: Sitting, Cuff Size: Standard)   Pulse 63   Temp 98 6 °F (37 °C) (Oral)   Resp 18   Ht 5' (1 524 m)   Wt 68 6 kg (151 lb 3 2 oz)   BMI 29 53 kg/m²      Constitutional: normal, well developed, well nourished, alert, in no distress and non-toxic and no overt pain behavior    Eyes: anicteric  HEENT: grossly intact  Neck: supple, symmetric, trachea midline and no masses   Pulmonary:even and unlabored  Cardiovascular:No edema or pitting edema present  Abdominal:  Tenderness noted in the left lower quadrant around the region of the left scar from the hernia repair  Also noted diffuse pain throughout the abdominal region on palpation  Skin:Normal without rashes or lesions and well hydrated  Psychiatric:Mood and affect appropriate  Neurologic:Cranial Nerves II-XII grossly intact  Musculoskeletal:antalgic and ambulates with cane diffuse tenderness noted throughout the body

## 2018-05-29 NOTE — PROGRESS NOTES
Assessment:  1  Hernia of abdominal wall    2  Chronic pain syndrome    3  Generalized abdominal pain    4  Generalized pain        Plan:  My impressions and treatment recommendations were discussed in detail with the patient, who verbalized understanding and had no further questions  The patient is complaining of chronic left lower quadrant pain from multiple hernia surgeries  I did offer the patient a iliohypogastric nerve block with steroid and local anesthetic, but she was not interested in this at this time  She states that she would like to [address] about it before proceeding  The in addition, the patient is complaining of generalized body pain  She reports that she has diagnosis of fibromyalgia, but has not seen a rheumatologist in several years  I offered the patient to get another opinion from another rheumatologist, Dr Herberth Mcallister, but she reported that she would like to [address] about it      In addition, the patient was complaining of low back pain and lower extremity radicular symptoms  I did mention to the patient that I could perform interventional pain procedures for this, but she was not interested  Follow-up is planned with the patient on an as-needed basis  Discharge instructions were provided  I personally saw and examined the patient and I agree with the above discussed plan of care  History of Present Illness:    Deja Driscoll is a 68 y o  female who presents to [facility] for initial evaluation of the above stated pain complaints  The patient has a past medical and chronic pain history as outlined in the assessment section  She was referred by Dr Otis Xavier  The patient reports a several year history of left lower quadrant abdominal pain  She reports that she has in a repair wall hernia of the left lower quadrant that continues to bother her    She is also complaining of generalized pain throughout her whole body and states that she carries a diagnosis of fibromyalgia  She is also complaining of low back pain with lower extremity radicular symptoms  The patient states that her pain is severe in 10/10 on the verbal numerical pain rating scale  The patient states that her pain is constant in nature without any typical pattern  He describes the pain as "burning, cramping, numbness, sharp, dull/aching, pressure like, and throbbing    The patient reports weakness in her lower extremities  She does report that prior, lying down, and relaxation decreases pain  Standing, bending, sitting, walking, exercise, coughing/sneezing, and bowel movements increases pain  The patient does report that there are no pain relieving factors  Of multiple surgeries of the left lower quadrant to repair the hernia have provided moderate relief  She also reports that nerve block/injections, heat/ice treatment, chiropractic manipulation, and osteopathic manipulation all provided moderate pain relief  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     Review of Systems:    Review of Systems   Constitutional: Positive for fever and unexpected weight change  Eyes: Positive for visual disturbance  Respiratory: Positive for shortness of breath  Cardiovascular: Positive for palpitations  Negative for chest pain  Gastrointestinal: Positive for constipation, diarrhea and nausea  Negative for vomiting  Genitourinary: Positive for difficulty urinating  Musculoskeletal: Positive for gait problem (difficulty walking/ decreased range of motion) and joint swelling  Negative for arthralgias and myalgias  Skin: Negative for rash  Neurological: Positive for dizziness and weakness (muscle weakness)  Negative for seizures  Psychiatric/Behavioral: Positive for dysphoric mood (depression)  All other systems reviewed and are negative          Patient Active Problem List   Diagnosis    Iron deficiency anemia secondary to inadequate dietary iron intake    Dyspnea on exertion    Iron deficiency anemia    Moderate episode of recurrent major depressive disorder (HCC)    Acquired hypothyroidism    Anxiety    Generalized abdominal pain    Inflammatory bowel disease    Hypomagnesemia    Fibromyalgia    Chronic pain syndrome    Hernia of abdominal wall    Generalized pain       Past Medical History:   Diagnosis Date    Acute hyperglycemia     Anxiety     Arthritis     Vickie-Dawson    Benign polyp of large intestine     Cancer ([facility] [address] 2010    breast    Cat-scratch disease     Connective tissue disease ([facility] [address] [facility]     DDD (degenerative disc disease), lumbar     Depression     Disease of thyroid gland     hypo    Diverticulitis large intestine     Feared complaint without diagnosis     last assessed: 17    Fibromyalgia     Full dentures     upper and lower    Genital warts     Hemorrhoids     Herniated lumbar intervertebral disc     History of transfusion     s/p diverticultits perf  emergency surgery    Hypertension     Irritable bowel syndrome     Lyme disease 2010    neurological residuals, feels ill daily, exhausted    Lymphedema     Rectocele     last assessed: 12    Shortness of breath     exertional    Skin pruritus     last assessed: 17    Spinal stenosis, lumbar     Urinary frequency     last assessed: 7/21/15    Wears glasses        Past Surgical History:   Procedure Laterality Date    APPENDECTOMY      during bowel surgery    BREAST SURGERY Left     CA in duct, needle bx     SECTION      CHOLECYSTECTOMY      open    COLON SURGERY      emergency perf diverticulitis, colostomy    COLON SURGERY      reversal of colostomy post rupture left side    COLONOSCOPY      3/1/11 Dr Lundy Gun: flexible sigmoidoscopy-ileosigmoid anastomotic ulcer (active mucositis with suppurative inflammatory exudate treated with Asacol and retention enema  )  08 Dr Talbot for RLQ abdominal pain: diverticulosis, otherwise normal rectum  Pain likely due to adhesions  12/5/06 Dr Lulú Han for abdominal Pain: sigmoid hyperplastic polyp, f/u 3 years   ESOPHAGOGASTRODUODENOSCOPY N/A 9/27/2016    Procedure: ESOPHAGOGASTRODUODENOSCOPY (EGD) and biopsy, Flex Sig;  Surgeon: Pino Burton MD;  Location: [facility]; Service:     EYE SURGERY Bilateral 2010    cataract with IOL    HERNIA REPAIR      x2 abdominal, x1 incisional    HYSTERECTOMY      age 29 post fall/ trauma/ emergency    LUMBAR EPIDURAL INJECTION      x4 L1-5    MASTECTOMY, RADICAL Bilateral     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, 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, no radiation or chemo    [place] COLSC FLX W/RMVL OF TUMOR POLYP LESION SNARE TQ N/A 4/3/2018    Procedure: COLONOSCOPY;  Surgeon: Herberth Higginbotham MD;  Location: [facility]; Service: Gastroenterology    [place] EGD TRANSORAL BIOPSY SINGLE/MULTIPLE N/A 4/3/2018    Procedure: ESOPHAGOGASTRODUODENOSCOPY (EGD); Surgeon: Herberth Higginbotham MD;  Location: [facility];   Service: Gastroenterology    RESECTION TONSIL RADICAL         Family History   Problem Relation Age of Onset    Heart disease Mother     Diverticulosis Mother     Emphysema Father     Cancer Sister      lung and breast, carcinoma of the pancreas    Heart disease Sister     Parkinsonism Brother     Lung cancer Brother      late 42's       Social History     Occupational History    Retired      Social History Main Topics    Smoking status: Never Smoker    Smokeless tobacco: Never Used    Alcohol use Yes      Comment: Social    Drug use: No    Sexual activity: Not on file         Current Outpatient Prescriptions:     ALPRAZolam (XANAX) 0 5 mg tablet, Take 1 tablet (0 5 mg total) by mouth 3 (three) times a day as needed for anxiety, Disp: 90 tablet, Rfl: 0    Cholecalciferol (VITAMIN D-3 PO), Take 1 tablet by mouth every evening , Disp: , Rfl:     dexlansoprazole (DEXILANT) 60 MG capsule, Take 60 mg by mouth every morning , Disp: , Rfl:     divalproex sodium (DEPAKOTE) 125 mg EC tablet, , Disp: , Rfl:     docusate sodium (COLACE) 100 mg capsule, Take 100 mg by mouth daily at bedtime  , Disp: , Rfl:     HYDROcodone-acetaminophen (XODOL) 7 5-300 MG per tablet, Take 1 tablet by mouth every 6 (six) hours as needed for moderate pain Please cancel Hydrocodone 7 5/ Ibuprofen  200 mgrx Max Daily Amount: 4 tablets, Disp: 45 tablet, Rfl: 0    levothyroxine 25 mcg tablet, TAKE 1 TABLET DAILY, Disp: 90 tablet, Rfl: 4    LIALDA 1 2 g EC tablet, , Disp: , Rfl:     mesalamine (ROWASA) 4 g, , Disp: , Rfl:     risperiDONE (RisperDAL) 0 25 mg tablet, , Disp: , Rfl:     valsartan (DIOVAN) 80 mg tablet, Take 80 mg by mouth every morning , Disp: , Rfl:     venlafaxine (EFFEXOR-XR) 75 mg 24 hr capsule, every morning  , Disp: , Rfl:     Allergies   Allergen Reactions    Duricef [Cefadroxil] GI Intolerance    Latex Rash    Sulfa Antibiotics Rash       Physical Exam:    /66 (BP Location: Right arm, Patient Position: Sitting, Cuff Size: Standard)   Pulse 63   Temp 98 6 °F (37 °C) (Oral)   Resp 18   Ht 5' (1 524 m)   Wt 68 6 kg (151 lb 3 2 oz)   BMI 29 53 kg/m²     Constitutional: normal, well developed, well nourished, alert, in no distress and non-toxic and no overt pain behavior  Eyes: anicteric  HEENT: grossly intact  Neck: supple, symmetric, trachea midline and no masses   Pulmonary:even and unlabored  Cardiovascular:No edema or pitting edema present  Abdominal:  Tenderness noted in the left lower quadrant around the region of the left scar from the hernia repair  Also noted diffuse pain throughout the abdominal region on palpation  Skin:Normal without rashes or lesions and well hydrated  Psychiatric:Mood and affect appropriate  Neurologic:Cranial Nerves II-XII grossly intact  Musculoskeletal:antalgic and ambulates with cane diffuse tenderness noted throughout the body

## 2018-05-30 DIAGNOSIS — F41.9 ANXIETY: ICD-10-CM

## 2018-05-30 DIAGNOSIS — M79.7 FIBROMYALGIA: ICD-10-CM

## 2018-05-30 RX ORDER — ALPRAZOLAM 0.5 MG/1
0.5 TABLET ORAL 3 TIMES DAILY PRN
Qty: 90 TABLET | Refills: 1 | Status: SHIPPED | OUTPATIENT
Start: 2018-05-30 | End: 2018-07-30 | Stop reason: SDUPTHER

## 2018-05-30 RX ORDER — HYDROCODONE BITARTRATE AND ACETAMINOPHEN 7.5; 3 MG/1; MG/1
1 TABLET ORAL EVERY 6 HOURS PRN
Qty: 45 TABLET | Refills: 0 | Status: SHIPPED | OUTPATIENT
Start: 2018-05-30 | End: 2018-05-30 | Stop reason: SDUPTHER

## 2018-05-30 RX ORDER — HYDROCODONE BITARTRATE AND ACETAMINOPHEN 7.5; 3 MG/1; MG/1
1 TABLET ORAL EVERY 6 HOURS PRN
Qty: 45 TABLET | Refills: 0 | Status: SHIPPED | OUTPATIENT
Start: 2018-05-30 | End: 2018-06-20 | Stop reason: SDUPTHER

## 2018-05-30 NOTE — PROGRESS NOTES
Please call this pt to  Narcotic rx which I couldn't escribe    Her Mg level was normal and Mg wasn't ordered

## 2018-05-31 ENCOUNTER — TELEPHONE (OUTPATIENT)
Dept: FAMILY MEDICINE CLINIC | Facility: CLINIC | Age: 78
End: 2018-05-31

## 2018-05-31 DIAGNOSIS — I10 ESSENTIAL HYPERTENSION: Primary | ICD-10-CM

## 2018-05-31 NOTE — TELEPHONE ENCOUNTER
DR Ortega Mcbride    PT ALSO NEEDS VALSARTAN DIOVAN 80 MG  90 DAYS + REFILL  [facility]    THANKS

## 2018-06-01 RX ORDER — VALSARTAN 80 MG/1
80 TABLET ORAL EVERY MORNING
Qty: 90 TABLET | Refills: 3 | Status: SHIPPED | OUTPATIENT
Start: 2018-06-01 | End: 2018-06-20 | Stop reason: SDUPTHER

## 2018-06-01 RX ORDER — VALSARTAN 80 MG/1
80 TABLET ORAL DAILY
Qty: 90 TABLET | Refills: 3 | Status: SHIPPED | OUTPATIENT
Start: 2018-06-01 | End: 2018-07-17 | Stop reason: SDUPTHER

## 2018-06-20 ENCOUNTER — OFFICE VISIT (OUTPATIENT)
Dept: FAMILY MEDICINE CLINIC | Facility: CLINIC | Age: 78
End: 2018-06-20
Payer: MEDICARE

## 2018-06-20 VITALS
HEART RATE: 80 BPM | TEMPERATURE: 98.4 F | RESPIRATION RATE: 18 BRPM | WEIGHT: 151 LBS | BODY MASS INDEX: 29.64 KG/M2 | SYSTOLIC BLOOD PRESSURE: 134 MMHG | OXYGEN SATURATION: 96 % | DIASTOLIC BLOOD PRESSURE: 88 MMHG | HEIGHT: 60 IN

## 2018-06-20 DIAGNOSIS — D50.9 IRON DEFICIENCY ANEMIA, UNSPECIFIED IRON DEFICIENCY ANEMIA TYPE: Primary | ICD-10-CM

## 2018-06-20 DIAGNOSIS — G89.4 CHRONIC PAIN SYNDROME: ICD-10-CM

## 2018-06-20 DIAGNOSIS — E03.9 ACQUIRED HYPOTHYROIDISM: ICD-10-CM

## 2018-06-20 DIAGNOSIS — E55.9 VITAMIN D DEFICIENCY: ICD-10-CM

## 2018-06-20 DIAGNOSIS — M79.7 FIBROMYALGIA: ICD-10-CM

## 2018-06-20 PROCEDURE — 99214 OFFICE O/P EST MOD 30 MIN: CPT | Performed by: FAMILY MEDICINE

## 2018-06-20 RX ORDER — RIFAXIMIN 550 MG/1
TABLET ORAL
COMMUNITY
Start: 2018-06-07 | End: 2018-09-13

## 2018-06-20 RX ORDER — HYDROCODONE BITARTRATE AND ACETAMINOPHEN 7.5; 3 MG/1; MG/1
1 TABLET ORAL EVERY 6 HOURS PRN
Qty: 45 TABLET | Refills: 0 | Status: SHIPPED | OUTPATIENT
Start: 2018-06-20 | End: 2018-06-20 | Stop reason: SDUPTHER

## 2018-06-20 RX ORDER — HYDROCODONE BITARTRATE AND ACETAMINOPHEN 7.5; 3 MG/1; MG/1
1 TABLET ORAL EVERY 6 HOURS PRN
Qty: 45 TABLET | Refills: 0 | Status: SHIPPED | OUTPATIENT
Start: 2018-06-20 | End: 2018-07-30 | Stop reason: SDUPTHER

## 2018-06-20 NOTE — PROGRESS NOTES
Assessment/Plan:    No problem-specific Assessment & Plan notes found for this encounter  Diagnoses and all orders for this visit:    Iron deficiency anemia, unspecified iron deficiency anemia type  -     CBC and Platelet; Future  -     Iron; Future  -     Ferritin; Future    Vitamin D deficiency  -     Vitamin D 25 hydroxy; Future    Fibromyalgia  -     Discontinue: HYDROcodone-acetaminophen (XODOL) 7 5-300 MG per tablet; Take 1 tablet by mouth every 6 (six) hours as needed for moderate pain Please cancel Hydrocodone 7 5/ Ibuprofen  200 mgrx Max Daily Amount: 4 tablets  -     Discontinue: HYDROcodone-acetaminophen (XODOL) 7 5-300 MG per tablet; Take 1 tablet by mouth every 6 (six) hours as needed for moderate pain Please cancel Hydrocodone 7 5/ Ibuprofen  200 mgrx Max Daily Amount: 4 tablets  -     HYDROcodone-acetaminophen (XODOL) 7 5-300 MG per tablet; Take 1 tablet by mouth every 6 (six) hours as needed for moderate pain Please cancel Hydrocodone 7 5/ Ibuprofen  200 mgrx Max Daily Amount: 4 tablets    Chronic pain syndrome    Acquired hypothyroidism  -     TSH, 3rd generation with Free T4 reflex; Future    Other orders  -     XIFAXAN 550 MG tablet;         Subjective:      Patient ID: Devan Mail is a 68 y o  female  This is a follow-up visit for 70-year-old white female who has a history of iron deficiency anemia  The patient also has a history of fibromyalgia and chronic pain syndrome  The patient had recent blood work to check her status of her vitamin-D deficiency  Other blood work was reviewed with the patient today  The patient was recently seen by her GI doctor and given a antibiotic for E coli diarrhea  She states that her abdominal pain is decreased since starting the antibiotic  Patient denies any other new complaints          The following portions of the patient's history were reviewed and updated as appropriate: allergies, current medications, past family history, past medical history, past social history, past surgical history and problem list     Review of Systems   Constitutional: Negative  Respiratory: Negative  Cardiovascular: Negative  Gastrointestinal: Positive for diarrhea  Musculoskeletal: Negative  Psychiatric/Behavioral: Negative  Objective:      /88 (BP Location: Left arm, Patient Position: Sitting, Cuff Size: Large)   Pulse 80   Temp 98 4 °F (36 9 °C) (Tympanic)   Resp 18   Ht 5' (1 524 m)   Wt 68 5 kg (151 lb)   SpO2 96%   BMI 29 49 kg/m²          Physical Exam   Constitutional: She is oriented to person, place, and time  The patient is overweight with a BMI of 29 49   Cardiovascular: Normal rate, regular rhythm, normal heart sounds and intact distal pulses  Pulmonary/Chest: Effort normal and breath sounds normal    Musculoskeletal:    Patient uses a cane to ambulate   Neurological: She is alert and oriented to person, place, and time  Psychiatric: She has a normal mood and affect   Her behavior is normal  Judgment and thought content normal

## 2018-06-21 ENCOUNTER — TELEPHONE (OUTPATIENT)
Dept: FAMILY MEDICINE CLINIC | Facility: CLINIC | Age: 78
End: 2018-06-21

## 2018-06-21 NOTE — PATIENT INSTRUCTIONS
The patient's hypothyroidism is stable at this point  The patient has iron deficiency anemia is also improved  Her iron and ferritin levels are also improving and she should continue her iron therapy  Her fibromyalgia is stable at this point and a prescription was written for chronic pain meds  The  Patient was given a prescription for repeat CBC and  platelet count and an iron and ferritin level in 3 months  A repeat vitamin-D level will also be done in 3 months  The patient still has vitamin-D deficiency  She will follow up with me in 3 months for her chronic medical problems

## 2018-06-21 NOTE — TELEPHONE ENCOUNTER
DR Sandra Cali - PT SEEN YESTERDAY  SHE WOULD LIKE TO KNOW IF YOU COULD SEND EYE DROPS TO THE PHARM

## 2018-07-01 DIAGNOSIS — H10.13 ALLERGIC CONJUNCTIVITIS OF BOTH EYES: Primary | ICD-10-CM

## 2018-07-01 RX ORDER — OLOPATADINE HYDROCHLORIDE 1 MG/ML
1 SOLUTION/ DROPS OPHTHALMIC 2 TIMES DAILY
Qty: 5 ML | Refills: 1 | Status: SHIPPED | OUTPATIENT
Start: 2018-07-01 | End: 2018-09-13

## 2018-07-16 ENCOUNTER — PATIENT OUTREACH (OUTPATIENT)
Dept: FAMILY MEDICINE CLINIC | Facility: CLINIC | Age: 78
End: 2018-07-16

## 2018-07-16 DIAGNOSIS — I10 ESSENTIAL HYPERTENSION: ICD-10-CM

## 2018-07-16 NOTE — TELEPHONE ENCOUNTER
DR Issac Barr - PT DID GET REFILL BUT IT WAS ONLY FOR 14 PILLS  SHE SAID SHE USUALLY GETS 80 PILLS AND SEND TO  [facility]

## 2018-07-17 DIAGNOSIS — I10 ESSENTIAL HYPERTENSION: ICD-10-CM

## 2018-07-17 RX ORDER — VALSARTAN 80 MG/1
TABLET ORAL
Qty: 90 TABLET | Refills: 3 | Status: CANCELLED | OUTPATIENT
Start: 2018-07-17

## 2018-07-17 RX ORDER — VALSARTAN 80 MG/1
80 TABLET ORAL DAILY
Qty: 90 TABLET | Refills: 3 | Status: SHIPPED | OUTPATIENT
Start: 2018-07-17 | End: 2018-07-27

## 2018-07-26 ENCOUNTER — TELEPHONE (OUTPATIENT)
Dept: FAMILY MEDICINE CLINIC | Facility: CLINIC | Age: 78
End: 2018-07-26

## 2018-07-26 DIAGNOSIS — I10 ESSENTIAL HYPERTENSION: ICD-10-CM

## 2018-07-27 DIAGNOSIS — I10 ESSENTIAL HYPERTENSION: Primary | ICD-10-CM

## 2018-07-27 RX ORDER — LOSARTAN POTASSIUM 50 MG/1
50 TABLET ORAL DAILY
Qty: 90 TABLET | Refills: 3 | Status: SHIPPED | OUTPATIENT
Start: 2018-07-27 | End: 2019-05-07 | Stop reason: SDUPTHER

## 2018-07-30 DIAGNOSIS — M79.7 FIBROMYALGIA: ICD-10-CM

## 2018-07-30 DIAGNOSIS — E03.9 ACQUIRED HYPOTHYROIDISM: ICD-10-CM

## 2018-07-30 DIAGNOSIS — F41.9 ANXIETY: ICD-10-CM

## 2018-07-30 NOTE — TELEPHONE ENCOUNTER
Received notification that the patient is taking Valsartan  This needs to be changed due to recall  Please order another medication in [facility]  Thanks

## 2018-08-01 RX ORDER — LEVOTHYROXINE SODIUM 0.03 MG/1
25 TABLET ORAL DAILY
Qty: 90 TABLET | Refills: 3 | Status: SHIPPED | OUTPATIENT
Start: 2018-08-01 | End: 2018-08-02 | Stop reason: CLARIF

## 2018-08-01 RX ORDER — HYDROCODONE BITARTRATE AND ACETAMINOPHEN 7.5; 3 MG/1; MG/1
1 TABLET ORAL EVERY 6 HOURS PRN
Qty: 45 TABLET | Refills: 0 | Status: SHIPPED | OUTPATIENT
Start: 2018-08-01 | End: 2018-08-22 | Stop reason: SDUPTHER

## 2018-08-01 RX ORDER — ALPRAZOLAM 0.5 MG/1
0.5 TABLET ORAL 3 TIMES DAILY PRN
Qty: 90 TABLET | Refills: 0 | OUTPATIENT
Start: 2018-08-01 | End: 2018-08-04 | Stop reason: SDUPTHER

## 2018-08-02 ENCOUNTER — TELEPHONE (OUTPATIENT)
Dept: FAMILY MEDICINE CLINIC | Facility: CLINIC | Age: 78
End: 2018-08-02

## 2018-08-02 NOTE — TELEPHONE ENCOUNTER
Pt says her thyroid level has been normal, she is wondering if she is still supposed to be taking levothyroxine  She is confused bc they gave her the generic for levoxyl brand and was always on synthroid generic   Nurse told her they are all the same

## 2018-08-04 RX ORDER — ALPRAZOLAM 0.5 MG/1
0.5 TABLET ORAL 3 TIMES DAILY PRN
Qty: 90 TABLET | Refills: 0 | Status: SHIPPED | OUTPATIENT
Start: 2018-08-04 | End: 2018-09-04 | Stop reason: SDUPTHER

## 2018-08-04 RX ORDER — LEVOTHYROXINE SODIUM 0.03 MG/1
25 TABLET ORAL DAILY
Qty: 90 TABLET | Refills: 3 | Status: SHIPPED | OUTPATIENT
Start: 2018-08-04 | End: 2019-02-04 | Stop reason: SDUPTHER

## 2018-08-07 ENCOUNTER — TELEPHONE (OUTPATIENT)
Dept: FAMILY MEDICINE CLINIC | Facility: CLINIC | Age: 78
End: 2018-08-07

## 2018-08-10 NOTE — TELEPHONE ENCOUNTER
Pt is having urinary frequency and will drop off a urine sample to be sent for culture in a nurse visit

## 2018-08-13 ENCOUNTER — CLINICAL SUPPORT (OUTPATIENT)
Dept: FAMILY MEDICINE CLINIC | Facility: CLINIC | Age: 78
End: 2018-08-13
Payer: MEDICARE

## 2018-08-13 DIAGNOSIS — R30.0 DYSURIA: Primary | ICD-10-CM

## 2018-08-13 LAB
BACTERIA UR QL AUTO: ABNORMAL /HPF
BILIRUB UR QL STRIP: NEGATIVE
CLARITY UR: CLEAR
COLOR UR: YELLOW
GLUCOSE UR STRIP-MCNC: NEGATIVE MG/DL
HGB UR QL STRIP.AUTO: NEGATIVE
HYALINE CASTS #/AREA URNS LPF: ABNORMAL /LPF
KETONES UR STRIP-MCNC: NEGATIVE MG/DL
LEUKOCYTE ESTERASE UR QL STRIP: ABNORMAL
NITRITE UR QL STRIP: NEGATIVE
NON-SQ EPI CELLS URNS QL MICRO: ABNORMAL /HPF
PH UR STRIP.AUTO: 6 [PH] (ref 4.5–8)
PROT UR STRIP-MCNC: NEGATIVE MG/DL
RBC #/AREA URNS AUTO: ABNORMAL /HPF
SP GR UR STRIP.AUTO: 1.01 (ref 1–1.03)
UROBILINOGEN UR QL STRIP.AUTO: 0.2 E.U./DL
WBC #/AREA URNS AUTO: ABNORMAL /HPF

## 2018-08-13 PROCEDURE — 99211 OFF/OP EST MAY X REQ PHY/QHP: CPT

## 2018-08-13 PROCEDURE — 81001 URINALYSIS AUTO W/SCOPE: CPT | Performed by: FAMILY MEDICINE

## 2018-08-13 PROCEDURE — 87086 URINE CULTURE/COLONY COUNT: CPT | Performed by: FAMILY MEDICINE

## 2018-08-14 LAB — BACTERIA UR CULT: NORMAL

## 2018-08-22 ENCOUNTER — TELEPHONE (OUTPATIENT)
Dept: FAMILY MEDICINE CLINIC | Facility: CLINIC | Age: 78
End: 2018-08-22

## 2018-08-22 DIAGNOSIS — M79.7 FIBROMYALGIA: ICD-10-CM

## 2018-08-22 RX ORDER — HYDROCODONE BITARTRATE AND ACETAMINOPHEN 7.5; 3 MG/1; MG/1
1 TABLET ORAL EVERY 6 HOURS PRN
Qty: 45 TABLET | Refills: 0 | Status: SHIPPED | OUTPATIENT
Start: 2018-08-22 | End: 2018-08-22 | Stop reason: SDUPTHER

## 2018-08-22 RX ORDER — HYDROCODONE BITARTRATE AND ACETAMINOPHEN 7.5; 3 MG/1; MG/1
1 TABLET ORAL EVERY 6 HOURS PRN
Qty: 45 TABLET | Refills: 0 | Status: SHIPPED | OUTPATIENT
Start: 2018-08-22 | End: 2018-09-18 | Stop reason: SDUPTHER

## 2018-08-23 ENCOUNTER — APPOINTMENT (OUTPATIENT)
Dept: LAB | Facility: CLINIC | Age: 78
End: 2018-08-23
Payer: MEDICARE

## 2018-08-23 DIAGNOSIS — D50.8 IRON DEFICIENCY ANEMIA SECONDARY TO INADEQUATE DIETARY IRON INTAKE: Primary | ICD-10-CM

## 2018-08-23 DIAGNOSIS — D50.8 IRON DEFICIENCY ANEMIA SECONDARY TO INADEQUATE DIETARY IRON INTAKE: ICD-10-CM

## 2018-08-23 LAB
ERYTHROCYTE [DISTWIDTH] IN BLOOD BY AUTOMATED COUNT: 13.2 % (ref 11.6–15.1)
FERRITIN SERPL-MCNC: 23 NG/ML (ref 8–388)
HCT VFR BLD AUTO: 37.7 % (ref 34.8–46.1)
HGB BLD-MCNC: 12.1 G/DL (ref 11.5–15.4)
IRON SERPL-MCNC: 99 UG/DL (ref 50–170)
MCH RBC QN AUTO: 31 PG (ref 26.8–34.3)
MCHC RBC AUTO-ENTMCNC: 32.1 G/DL (ref 31.4–37.4)
MCV RBC AUTO: 97 FL (ref 82–98)
PLATELET # BLD AUTO: 208 THOUSANDS/UL (ref 149–390)
PMV BLD AUTO: 10.8 FL (ref 8.9–12.7)
RBC # BLD AUTO: 3.9 MILLION/UL (ref 3.81–5.12)
WBC # BLD AUTO: 7.21 THOUSAND/UL (ref 4.31–10.16)

## 2018-08-23 PROCEDURE — 85027 COMPLETE CBC AUTOMATED: CPT

## 2018-08-23 PROCEDURE — 83540 ASSAY OF IRON: CPT

## 2018-08-23 PROCEDURE — 36415 COLL VENOUS BLD VENIPUNCTURE: CPT

## 2018-08-23 PROCEDURE — 82728 ASSAY OF FERRITIN: CPT

## 2018-09-04 DIAGNOSIS — F41.9 ANXIETY: ICD-10-CM

## 2018-09-05 NOTE — TELEPHONE ENCOUNTER
DR Matias Steele ,CAN YOU PLEASE REFILL THIS FOR DR MERA PT ?SHE WILL BE OUT TOMORROW AM USES [address]

## 2018-09-06 RX ORDER — ALPRAZOLAM 0.5 MG/1
0.5 TABLET ORAL 3 TIMES DAILY PRN
Qty: 90 TABLET | Refills: 0 | Status: SHIPPED | OUTPATIENT
Start: 2018-09-06 | End: 2018-09-18 | Stop reason: SDUPTHER

## 2018-09-06 RX ORDER — ALPRAZOLAM 0.5 MG/1
TABLET ORAL
Qty: 90 TABLET | Refills: 0 | Status: SHIPPED | OUTPATIENT
Start: 2018-09-06 | End: 2018-09-13

## 2018-09-06 NOTE — TELEPHONE ENCOUNTER
DR Luiz Corbett - PT IS CALLING FOR HER REFILL  SEE PREVIOUS MESSAGE  SHE WOULD REALLY LIKE TO HAVE THIS TODAY

## 2018-09-10 ENCOUNTER — TELEPHONE (OUTPATIENT)
Dept: FAMILY MEDICINE CLINIC | Facility: CLINIC | Age: 78
End: 2018-09-10

## 2018-09-11 NOTE — TELEPHONE ENCOUNTER
DR Ami Oconnor - PT IS FEELING VERY TIRED FATIGUE  SHE WOULD LIKE TO SPEAK TO YOU  SHE WOULD  ALSO LIKE TO HAVE HER CBC ORDERED

## 2018-09-13 ENCOUNTER — APPOINTMENT (EMERGENCY)
Dept: RADIOLOGY | Facility: HOSPITAL | Age: 78
End: 2018-09-13
Payer: MEDICARE

## 2018-09-13 ENCOUNTER — OFFICE VISIT (OUTPATIENT)
Dept: FAMILY MEDICINE CLINIC | Facility: CLINIC | Age: 78
End: 2018-09-13
Payer: MEDICARE

## 2018-09-13 ENCOUNTER — HOSPITAL ENCOUNTER (EMERGENCY)
Facility: HOSPITAL | Age: 78
Discharge: HOME | End: 2018-09-13
Attending: EMERGENCY MEDICINE
Payer: MEDICARE

## 2018-09-13 VITALS
RESPIRATION RATE: 16 BRPM | BODY MASS INDEX: 29.07 KG/M2 | HEIGHT: 61 IN | OXYGEN SATURATION: 99 % | SYSTOLIC BLOOD PRESSURE: 185 MMHG | HEART RATE: 86 BPM | WEIGHT: 154 LBS | TEMPERATURE: 98.5 F | DIASTOLIC BLOOD PRESSURE: 90 MMHG

## 2018-09-13 VITALS
OXYGEN SATURATION: 97 % | TEMPERATURE: 97.7 F | BODY MASS INDEX: 29.07 KG/M2 | HEIGHT: 61 IN | DIASTOLIC BLOOD PRESSURE: 78 MMHG | WEIGHT: 154 LBS | RESPIRATION RATE: 18 BRPM | HEART RATE: 74 BPM | SYSTOLIC BLOOD PRESSURE: 128 MMHG

## 2018-09-13 DIAGNOSIS — E55.9 VITAMIN D DEFICIENCY: ICD-10-CM

## 2018-09-13 DIAGNOSIS — K52.9 INFLAMMATORY BOWEL DISEASE: Primary | ICD-10-CM

## 2018-09-13 DIAGNOSIS — R10.9 ABDOMINAL PAIN: Primary | ICD-10-CM

## 2018-09-13 DIAGNOSIS — R10.84 GENERALIZED ABDOMINAL PAIN: ICD-10-CM

## 2018-09-13 DIAGNOSIS — D50.8 IRON DEFICIENCY ANEMIA SECONDARY TO INADEQUATE DIETARY IRON INTAKE: ICD-10-CM

## 2018-09-13 LAB
ALBUMIN SERPL BCP-MCNC: 3.8 G/DL (ref 3.5–5)
ALP SERPL-CCNC: 62 U/L (ref 46–116)
ALT SERPL W P-5'-P-CCNC: 18 U/L (ref 12–78)
ANION GAP SERPL CALCULATED.3IONS-SCNC: 7 MMOL/L (ref 4–13)
AST SERPL W P-5'-P-CCNC: 20 U/L (ref 5–45)
BASOPHILS # BLD AUTO: 0.03 THOUSANDS/ΜL (ref 0–0.1)
BASOPHILS NFR BLD AUTO: 1 % (ref 0–1)
BILIRUB SERPL-MCNC: 0.4 MG/DL (ref 0.2–1)
BILIRUB UR QL STRIP: NEGATIVE
BUN SERPL-MCNC: 15 MG/DL (ref 5–25)
CALCIUM SERPL-MCNC: 9.7 MG/DL (ref 8.3–10.1)
CHLORIDE SERPL-SCNC: 99 MMOL/L (ref 100–108)
CLARITY UR: CLEAR
CO2 SERPL-SCNC: 29 MMOL/L (ref 21–32)
COLOR UR: YELLOW
CREAT SERPL-MCNC: 0.81 MG/DL (ref 0.6–1.3)
EOSINOPHIL # BLD AUTO: 0.11 THOUSAND/ΜL (ref 0–0.61)
EOSINOPHIL NFR BLD AUTO: 2 % (ref 0–6)
ERYTHROCYTE [DISTWIDTH] IN BLOOD BY AUTOMATED COUNT: 12.8 % (ref 11.6–15.1)
GFR SERPL CREATININE-BSD FRML MDRD: 70 ML/MIN/1.73SQ M
GLUCOSE SERPL-MCNC: 120 MG/DL (ref 65–140)
GLUCOSE UR STRIP-MCNC: NEGATIVE MG/DL
HCT VFR BLD AUTO: 39.1 % (ref 34.8–46.1)
HGB BLD-MCNC: 12.6 G/DL (ref 11.5–15.4)
HGB UR QL STRIP.AUTO: NEGATIVE
IMM GRANULOCYTES # BLD AUTO: 0.02 THOUSAND/UL (ref 0–0.2)
IMM GRANULOCYTES NFR BLD AUTO: 0 % (ref 0–2)
KETONES UR STRIP-MCNC: NEGATIVE MG/DL
LEUKOCYTE ESTERASE UR QL STRIP: NEGATIVE
LIPASE SERPL-CCNC: 73 U/L (ref 73–393)
LYMPHOCYTES # BLD AUTO: 0.96 THOUSANDS/ΜL (ref 0.6–4.47)
LYMPHOCYTES NFR BLD AUTO: 18 % (ref 14–44)
MCH RBC QN AUTO: 30.7 PG (ref 26.8–34.3)
MCHC RBC AUTO-ENTMCNC: 32.2 G/DL (ref 31.4–37.4)
MCV RBC AUTO: 95 FL (ref 82–98)
MONOCYTES # BLD AUTO: 0.63 THOUSAND/ΜL (ref 0.17–1.22)
MONOCYTES NFR BLD AUTO: 12 % (ref 4–12)
NEUTROPHILS # BLD AUTO: 3.56 THOUSANDS/ΜL (ref 1.85–7.62)
NEUTS SEG NFR BLD AUTO: 67 % (ref 43–75)
NITRITE UR QL STRIP: NEGATIVE
NRBC BLD AUTO-RTO: 0 /100 WBCS
PH UR STRIP.AUTO: 6 [PH] (ref 5–9)
PLATELET # BLD AUTO: 227 THOUSANDS/UL (ref 149–390)
PMV BLD AUTO: 10.4 FL (ref 8.9–12.7)
POTASSIUM SERPL-SCNC: 4 MMOL/L (ref 3.5–5.3)
PROT SERPL-MCNC: 7.6 G/DL (ref 6.4–8.2)
PROT UR STRIP-MCNC: NEGATIVE MG/DL
RBC # BLD AUTO: 4.1 MILLION/UL (ref 3.81–5.12)
SODIUM SERPL-SCNC: 135 MMOL/L (ref 136–145)
SP GR UR STRIP.AUTO: 1.01 (ref 1–1.03)
UROBILINOGEN UR QL STRIP.AUTO: 0.2 E.U./DL
WBC # BLD AUTO: 5.31 THOUSAND/UL (ref 4.31–10.16)

## 2018-09-13 PROCEDURE — 74177 CT ABD & PELVIS W/CONTRAST: CPT

## 2018-09-13 PROCEDURE — 96374 THER/PROPH/DIAG INJ IV PUSH: CPT

## 2018-09-13 PROCEDURE — 96375 TX/PRO/DX INJ NEW DRUG ADDON: CPT

## 2018-09-13 PROCEDURE — 85025 COMPLETE CBC W/AUTO DIFF WBC: CPT | Performed by: EMERGENCY MEDICINE

## 2018-09-13 PROCEDURE — 83690 ASSAY OF LIPASE: CPT | Performed by: EMERGENCY MEDICINE

## 2018-09-13 PROCEDURE — 81003 URINALYSIS AUTO W/O SCOPE: CPT | Performed by: EMERGENCY MEDICINE

## 2018-09-13 PROCEDURE — 80053 COMPREHEN METABOLIC PANEL: CPT | Performed by: EMERGENCY MEDICINE

## 2018-09-13 PROCEDURE — 96361 HYDRATE IV INFUSION ADD-ON: CPT

## 2018-09-13 PROCEDURE — 36415 COLL VENOUS BLD VENIPUNCTURE: CPT

## 2018-09-13 PROCEDURE — 99214 OFFICE O/P EST MOD 30 MIN: CPT | Performed by: FAMILY MEDICINE

## 2018-09-13 PROCEDURE — 99284 EMERGENCY DEPT VISIT MOD MDM: CPT

## 2018-09-13 RX ORDER — MORPHINE SULFATE 4 MG/ML
2 INJECTION, SOLUTION INTRAMUSCULAR; INTRAVENOUS ONCE
Status: COMPLETED | OUTPATIENT
Start: 2018-09-13 | End: 2018-09-13

## 2018-09-13 RX ORDER — ONDANSETRON 2 MG/ML
4 INJECTION INTRAMUSCULAR; INTRAVENOUS ONCE
Status: COMPLETED | OUTPATIENT
Start: 2018-09-13 | End: 2018-09-13

## 2018-09-13 RX ADMIN — ONDANSETRON 4 MG: 2 INJECTION INTRAMUSCULAR; INTRAVENOUS at 20:58

## 2018-09-13 RX ADMIN — MORPHINE SULFATE 2 MG: 4 INJECTION INTRAVENOUS at 21:43

## 2018-09-13 RX ADMIN — SODIUM CHLORIDE 1000 ML: 0.9 INJECTION, SOLUTION INTRAVENOUS at 20:59

## 2018-09-13 RX ADMIN — IOHEXOL 100 ML: 350 INJECTION, SOLUTION INTRAVENOUS at 22:11

## 2018-09-14 ENCOUNTER — VBI (OUTPATIENT)
Dept: FAMILY MEDICINE CLINIC | Facility: CLINIC | Age: 78
End: 2018-09-14

## 2018-09-14 NOTE — TELEPHONE ENCOUNTER
Pt was seen in [address] on 9/13/18  CC: Abdominal pain  DX; Abdominal Pain  Pt states she will f/u w/Dr Doris Badillo on next appt @ [facility] on 9/26/18

## 2018-09-14 NOTE — DISCHARGE INSTRUCTIONS
Chronic Abdominal Pain, Ambulatory Care - your blood tests and CT scan are normal at this time  The pain may be due scar tissue or adhesions in the abdomen  If you are concerned with some of your medicines maybe contributing to your not feeling well, please talk to Dr Yamel Kilpatrick about this  You can take one or two more vicodin a day if needed since you are only taking one but only if you really need it  You can try a heating pad/warm compress to the abdomen  GENERAL INFORMATION:   Abdominal pain  can be dull, achy, or sharp  You may have pain in one area of your abdomen, or in your entire abdomen  Your pain may be caused by constipation, food sensitivity or poisoning, infection, or a blockage  Abdominal pain can also be caused by a hernia, appendicitis, or an ulcer  The cause of your abdominal pain may be unknown  You may need further testing beyond what we can do in the ER  Seek immediate care for the following symptoms:   · New chest pain or shortness of breath    · Pulsing pain in your upper abdomen or lower back that suddenly becomes constant    · Pain in the right lower abdominal area that worsens with movement    · Fever over 100 4°F (38°C) or shaking chills    · Vomiting and you cannot keep food or fluids down    · Pain does not improve or gets worse over the next 8 to 12 hours    · Blood in your vomit or bowel movements, or they look black and tarry    · Skin or the whites of your eyes turn yellow    · Large amount of vaginal bleeding that is not your monthly period  Treatment for abdominal pain  may include medicine to calm your stomach, prevent vomiting, or decrease pain  Follow up with your healthcare provider as directed:  Write down your questions so you remember to ask them during your visits  CARE AGREEMENT:   You have the right to help plan your care  Learn about your health condition and how it may be treated   Discuss treatment options with your caregivers to decide what care you want to receive  You always have the right to refuse treatment  The above information is an  only  It is not intended as medical advice for individual conditions or treatments  Talk to your doctor, nurse or pharmacist before following any medical regimen to see if it is safe and effective for you  © 2014 [address] is for End User's use only and may not be sold, redistributed or otherwise used for commercial purposes  All illustrations and images included in CareNotes® are the copyrighted property of A D A M , Inc  or Karl Ledesma

## 2018-09-14 NOTE — PROGRESS NOTES
Assessment/Plan:    No problem-specific Assessment & Plan notes found for this encounter  Diagnoses and all orders for this visit:    Inflammatory bowel disease    Generalized abdominal pain    Iron deficiency anemia secondary to inadequate dietary iron intake    Vitamin D deficiency        Subjective:      Patient ID: Jad Hoahaoism is a 66 y o  female  This is a 68-year-old white female with several days complaint of lower left abdominal pain  The patient has a history of inflammatory bowel disease  She also has a history of bowel resections in the past   The patient denies any fevers  The patient states generalized nausea but no vomiting  Her appetite has been decreased for 2 days  Patient does have a history of iron deficiency anemia and vitamin-D deficiency  She also has a history of depression and is seen by Dr Kimmy Islas who recently adjusted her medications  Eye Problem    Associated symptoms include nausea  The following portions of the patient's history were reviewed and updated as appropriate: allergies, current medications, past family history, past medical history, past social history, past surgical history and problem list     Review of Systems   Constitutional: Positive for appetite change and fatigue  Respiratory: Negative  Gastrointestinal: Positive for abdominal pain and nausea  Objective:      /78 (BP Location: Left arm, Patient Position: Sitting, Cuff Size: Standard)   Pulse 74   Temp 97 7 °F (36 5 °C) (Tympanic)   Resp 18   Ht 5' 1" (1 549 m)   Wt 69 9 kg (154 lb)   SpO2 97%   BMI 29 10 kg/m²          Physical Exam   Constitutional: She is oriented to person, place, and time  She appears distressed  Patient is ill-appearing tonight with her abdominal pain  Cardiovascular: Normal rate, regular rhythm and normal heart sounds  Pulmonary/Chest: Effort normal and breath sounds normal    Abdominal: There is tenderness  There is guarding  Musculoskeletal:   The patient uses a cane to ambulate   Neurological: She is alert and oriented to person, place, and time  Psychiatric:   The patient has a depressed mood secondary to her abdominal pain and her past history of depression   Vitals reviewed

## 2018-09-14 NOTE — PATIENT INSTRUCTIONS
The patient was referred to the emergency room secondary to her abdominal pain  The patient has a history of inflammatory bowel disease  The emergency room was called to notify them about her impending visit  The patient will need a workup to include a CBC and diff and potentially a CT scan secondary to her complaints  The patient does see Dr Dhruv Olmstead in the past for her inflammatory bowel disease

## 2018-09-14 NOTE — ED PROVIDER NOTES
History  Chief Complaint   Patient presents with    Abdominal Pain     Sent in by Dr Kasia Menchaca, pt c/o left lower abdominal pain, history of hernia in same area  C/o nausea  Pt of Dr Yolanda Drake  66 yowf with chronic abdominal pain sent from [facility] office by Dr Kasia Menchaca for evaluation of worsened abdominal pain and weakness and severe nausea today  No vomiting  No fever  No chest pain  No diarrhea  Last BM today  No dysuria  History provided by:  Patient   used: No    Abdominal Pain   Associated symptoms: chills and nausea    Associated symptoms: no chest pain, no cough, no diarrhea, no dysuria, no fever, no shortness of breath and no vomiting        Prior to Admission Medications   Prescriptions Last Dose Informant Patient Reported? Taking? ALPRAZolam (XANAX) 0 5 mg tablet   No Yes   Sig: Take 1 tablet (0 5 mg total) by mouth 3 (three) times a day as needed for anxiety   Cholecalciferol (VITAMIN D-3 PO)   Yes Yes   Sig: Take 1 tablet by mouth every evening  HYDROcodone-acetaminophen (XODOL) 7 5-300 MG per tablet   No Yes   Sig: Take 1 tablet by mouth every 6 (six) hours as needed for moderate pain Please cancel Hydrocodone 7 5/ Ibuprofen  200 mgrx Max Daily Amount: 4 tablets   LIALDA 1 2 g EC tablet   Yes Yes   Sig: Take 2,400 mg by mouth daily     dexlansoprazole (DEXILANT) 60 MG capsule   Yes Yes   Sig: Take 60 mg by mouth every morning  divalproex sodium (DEPAKOTE) 125 mg EC tablet   Yes Yes   Sig: Take 125 mg by mouth daily at bedtime     docusate sodium (COLACE) 100 mg capsule   Yes Yes   Sig: Take 100 mg by mouth daily at bedtime     levothyroxine 25 mcg tablet   No Yes   Sig: Take 1 tablet (25 mcg total) by mouth daily   losartan (COZAAR) 50 mg tablet   No Yes   Sig: Take 1 tablet (50 mg total) by mouth daily   venlafaxine (EFFEXOR-XR) 75 mg 24 hr capsule   Yes Yes   Sig: Take 150 mg by mouth every morning        Facility-Administered Medications: None       Past Medical History:   Diagnosis Date    Acute hyperglycemia     Anxiety     Arthritis     [place]ick[place]    Benign polyp of large intestine     Cancer ([facility] [address] )     breast    Cat-scratch disease     Connective tissue disease ([facility] [address] )     DDD (degenerative disc disease), lumbar     Depression     Disease of thyroid gland     hypo    Diverticulitis large intestine     Feared complaint without diagnosis     last assessed: 17    Fibromyalgia     Full dentures     upper and lower    Genital warts     Hemorrhoids     Herniated lumbar intervertebral disc     History of transfusion     s/p diverticultits perf  emergency surgery    Hypertension     Irritable bowel syndrome     Lyme disease 2010    neurological residuals, feels ill daily, exhausted    Lymphedema     Rectocele     last assessed: 12    Shortness of breath     exertional    Skin pruritus     last assessed: 17    Spinal stenosis, lumbar     Urinary frequency     last assessed: 7/21/15    Wears glasses        Past Surgical History:   Procedure Laterality Date    APPENDECTOMY      during bowel surgery    BREAST SURGERY Left     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, needle bx     SECTION      CHOLECYSTECTOMY      open    COLON SURGERY      emergency perf diverticulitis, colostomy    COLON SURGERY      reversal of colostomy post rupture left side    COLONOSCOPY      3/1/11 Dr Ramón Frazier: flexible sigmoidoscopy-ileosigmoid anastomotic ulcer (active mucositis with suppurative inflammatory exudate treated with Asacol and retention enema  )  08 Dr Talbot for RLQ abdominal pain: diverticulosis, otherwise normal rectum  Pain likely due to adhesions  06 Dr Ramón Frazier for abdominal Pain: sigmoid hyperplastic polyp, f/u 3 years   ESOPHAGOGASTRODUODENOSCOPY N/A 2016    Procedure: ESOPHAGOGASTRODUODENOSCOPY (EGD) and biopsy, Flex Sig;  Surgeon: Kamaljit Barakat MD;  Location: [place] [address] GI LAB;   Service:     EYE SURGERY Bilateral  cataract with IOL    HERNIA REPAIR      x2 abdominal, x1 incisional    HYSTERECTOMY      age 29 post fall/ trauma/ emergency    LUMBAR EPIDURAL INJECTION      x4 L1-5    MASTECTOMY, RADICAL Bilateral     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, 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, no radiation or chemo    WI COLSC FLX W/RMVL OF TUMOR POLYP LESION SNARE TQ N/A 4/3/2018    Procedure: COLONOSCOPY;  Surgeon: Ricardo Levin MD;  Location: [facility]; Service: Gastroenterology    WI EGD TRANSORAL BIOPSY SINGLE/MULTIPLE N/A 4/3/2018    Procedure: ESOPHAGOGASTRODUODENOSCOPY (EGD); Surgeon: Ricardo Levin MD;  Location: [facility]; Service: Gastroenterology    RESECTION TONSIL RADICAL         Family History   Problem Relation Age of Onset    Heart disease Mother     Diverticulosis Mother     Emphysema Father     Cancer Sister         lung and breast, carcinoma of the pancreas    Heart disease Sister     Parkinsonism Brother     Lung cancer Brother         late 42's     I have reviewed and agree with the history as documented  Social History   Substance Use Topics    Smoking status: Never Smoker    Smokeless tobacco: Never Used    Alcohol use Yes      Comment: Social        Review of Systems   Constitutional: Positive for chills  Negative for fever  HENT: Negative  Eyes: Negative  Respiratory: Negative  Negative for cough and shortness of breath  Cardiovascular: Negative  Negative for chest pain  Gastrointestinal: Positive for abdominal pain and nausea  Negative for diarrhea and vomiting  Genitourinary: Negative  Negative for dysuria and flank pain  Musculoskeletal: Negative  Negative for back pain and myalgias  Skin: Negative  Negative for rash and wound  Neurological: Positive for weakness  Negative for dizziness and headaches  Hematological: Does not bruise/bleed easily  Psychiatric/Behavioral: Negative  All other systems reviewed and are negative        Physical Exam  Physical Exam Constitutional: She is oriented to person, place, and time  She appears well-developed and well-nourished  No distress  Not ill or toxic appearing   HENT:   Head: Normocephalic and atraumatic  Eyes: Conjunctivae are normal  Pupils are equal, round, and reactive to light  Neck: Normal range of motion  Neck supple  Cardiovascular: Normal rate, regular rhythm and normal heart sounds  No murmur heard  Pulmonary/Chest: Effort normal and breath sounds normal  No respiratory distress  Abdominal: Soft  Bowel sounds are normal  She exhibits no distension  There is tenderness  + ttp left lower abdomen and epigastrium   Musculoskeletal: Normal range of motion  She exhibits no edema or deformity  Neurological: She is alert and oriented to person, place, and time  No cranial nerve deficit  She exhibits normal muscle tone  Skin: Skin is warm and dry  No rash noted  She is not diaphoretic  No pallor  Psychiatric: She has a normal mood and affect  Her behavior is normal    Nursing note and vitals reviewed        Vital Signs  ED Triage Vitals [09/13/18 2027]   Temperature Pulse Respirations Blood Pressure SpO2   98 5 °F (36 9 °C) 86 16 (!) 185/90 99 %      Temp Source Heart Rate Source Patient Position - Orthostatic VS BP Location FiO2 (%)   Tympanic Monitor Sitting Right arm --      Pain Score       8           Vitals:    09/13/18 2027   BP: (!) 185/90   Pulse: 86   Patient Position - Orthostatic VS: Sitting       Visual Acuity      ED Medications  Medications   ondansetron (ZOFRAN) injection 4 mg (4 mg Intravenous Given 9/13/18 2058)   sodium chloride 0 9 % bolus 1,000 mL (1,000 mL Intravenous New Bag 9/13/18 2059)   morphine (PF) 4 mg/mL injection 2 mg (2 mg Intravenous Given 9/13/18 2143)   iohexol (OMNIPAQUE) 350 MG/ML injection (MULTI-DOSE) 100 mL (100 mL Intravenous Given 9/13/18 2211)       Diagnostic Studies  Results Reviewed     Procedure Component Value Units Date/Time    Comprehensive metabolic panel [[id number]]  (Abnormal) Collected:  09/13/18 2058    Lab Status:  Final result Specimen:  Blood from Arm, Right Updated:  09/13/18 2119     Sodium 135 (L) mmol/L      Potassium 4 0 mmol/L      Chloride 99 (L) mmol/L      CO2 29 mmol/L      ANION GAP 7 mmol/L      BUN 15 mg/dL      Creatinine 0 81 mg/dL      Glucose 120 mg/dL      Calcium 9 7 mg/dL      AST 20 U/L      ALT 18 U/L      Alkaline Phosphatase 62 U/L      Total Protein 7 6 g/dL      Albumin 3 8 g/dL      Total Bilirubin 0 40 mg/dL      eGFR 70 ml/min/1 73sq m     Narrative:         National Kidney Disease Education Program recommendations are as follows:  GFR calculation is accurate only with a steady state creatinine  Chronic Kidney disease less than 60 ml/min/1 73 sq  meters  Kidney failure less than 15 ml/min/1 73 sq  meters      Lipase [[id number]]  (Normal) Collected:  09/13/18 2058    Lab Status:  Final result Specimen:  Blood from Arm, Right Updated:  09/13/18 2119     Lipase 73 u/L     UA w Reflex to Microscopic w Reflex to Culture [[id number]] Collected:  09/13/18 2058    Lab Status:  Final result Specimen:  Urine from Urine, Clean Catch Updated:  09/13/18 2105     Color, UA Yellow     Clarity, UA Clear     Specific Gravity, UA 1 015     pH, UA 6 0     Leukocytes, UA Negative     Nitrite, UA Negative     Protein, UA Negative mg/dl      Glucose, UA Negative mg/dl      Ketones, UA Negative mg/dl      Urobilinogen, UA 0 2 E U /dl      Bilirubin, UA Negative     Blood, UA Negative    CBC and differential [[id number]] Collected:  09/13/18 2058    Lab Status:  Final result Specimen:  Blood from Arm, Right Updated:  09/13/18 2104     WBC 5 31 Thousand/uL      RBC 4 10 Million/uL      Hemoglobin 12 6 g/dL      Hematocrit 39 1 %      MCV 95 fL      MCH 30 7 pg      MCHC 32 2 g/dL      RDW 12 8 %      MPV 10 4 fL      Platelets 563 Thousands/uL      nRBC 0 /100 WBCs      Neutrophils Relative 67 %      Immat GRANS % 0 %      Lymphocytes Relative 18 % Monocytes Relative 12 %      Eosinophils Relative 2 %      Basophils Relative 1 %      Neutrophils Absolute 3 56 Thousands/µL      Immature Grans Absolute 0 02 Thousand/uL      Lymphocytes Absolute 0 96 Thousands/µL      Monocytes Absolute 0 63 Thousand/µL      Eosinophils Absolute 0 11 Thousand/µL      Basophils Absolute 0 03 Thousands/µL                  CT abdomen pelvis with contrast   Final Result by Simon Alicia MD (09/13 2232)      No acute CT abnormality in the abdomen or pelvis to account for the patient's symptoms  No significant interval change when compared to May 15, 2017  No suspicious hernia or bowel obstruction  Workstation performed: [id number]                    Procedures  Procedures       Phone Contacts  ED Phone Contact    ED Course                               MDM  Number of Diagnoses or Management Options  Abdominal pain:   Diagnosis management comments: Pt  Feeling better  Evaluation negative  Pt  Leslie Jaimes with going home  She thinks maybe some of her medicines maybe are making her feel bad, or maybe just the depression itself, or the chronic Lyme disease  She will follow up outpt  With Dr Ana Simon  She is only taking one vicodin a day so she could take more if needed  She wants to  herself home  CritCare Time    Disposition  Final diagnoses:   Abdominal pain     Time reflects when diagnosis was documented in both MDM as applicable and the Disposition within this note     Time User Action Codes Description Comment    5/31/8785 99:33 PM Cricket KELLY Add [G40 8] Abdominal pain       ED Disposition     ED Disposition Condition Comment    Discharge  Jason Ga discharge to home/self care      Condition at discharge: Stable        Follow-up Information     Follow up With Specialties Details Why [address]   [address]  [facility]  [phone]            Patient's Medications   Discharge Prescriptions    No medications on file     No discharge procedures on file      ED Provider  Electronically Signed by           Best Choudhary MD  16/16/91 [id number]

## 2018-09-18 DIAGNOSIS — F41.9 ANXIETY: ICD-10-CM

## 2018-09-18 DIAGNOSIS — M79.7 FIBROMYALGIA: ICD-10-CM

## 2018-09-21 DIAGNOSIS — M79.7 FIBROMYALGIA: ICD-10-CM

## 2018-09-21 NOTE — TELEPHONE ENCOUNTER
DR Burton De La Rosa - PT IS IN NEED OF HER HYDROCODONE  SHE NEEDS TO HAVE THIS TODAY  SHE WANTS A CALL WHEN THIS IS DONE

## 2018-09-24 RX ORDER — HYDROCODONE BITARTRATE AND ACETAMINOPHEN 7.5; 3 MG/1; MG/1
1 TABLET ORAL EVERY 6 HOURS PRN
Qty: 45 TABLET | Refills: 0 | Status: SHIPPED | OUTPATIENT
Start: 2018-09-24 | End: 2018-09-24 | Stop reason: SDUPTHER

## 2018-09-24 RX ORDER — HYDROCODONE BITARTRATE AND ACETAMINOPHEN 7.5; 3 MG/1; MG/1
1 TABLET ORAL EVERY 6 HOURS PRN
Qty: 45 TABLET | Refills: 0 | Status: SHIPPED | OUTPATIENT
Start: 2018-09-24 | End: 2018-09-24 | Stop reason: CLARIF

## 2018-09-24 RX ORDER — HYDROCODONE BITARTRATE AND ACETAMINOPHEN 7.5; 3 MG/1; MG/1
TABLET ORAL
Qty: 45 TABLET | Refills: 0 | Status: SHIPPED | OUTPATIENT
Start: 2018-09-24 | End: 2018-10-12 | Stop reason: SDUPTHER

## 2018-09-24 RX ORDER — ALPRAZOLAM 0.5 MG/1
0.5 TABLET ORAL 3 TIMES DAILY PRN
Qty: 90 TABLET | Refills: 1 | Status: SHIPPED | OUTPATIENT
Start: 2018-09-24 | End: 2018-11-06 | Stop reason: SDUPTHER

## 2018-09-26 ENCOUNTER — OFFICE VISIT (OUTPATIENT)
Dept: FAMILY MEDICINE CLINIC | Facility: CLINIC | Age: 78
End: 2018-09-26
Payer: MEDICARE

## 2018-09-26 VITALS
HEART RATE: 83 BPM | HEIGHT: 61 IN | SYSTOLIC BLOOD PRESSURE: 138 MMHG | WEIGHT: 153 LBS | BODY MASS INDEX: 28.89 KG/M2 | DIASTOLIC BLOOD PRESSURE: 86 MMHG | TEMPERATURE: 97.7 F | RESPIRATION RATE: 18 BRPM | OXYGEN SATURATION: 97 %

## 2018-09-26 DIAGNOSIS — F41.9 ANXIETY: ICD-10-CM

## 2018-09-26 DIAGNOSIS — M79.7 FIBROMYALGIA: ICD-10-CM

## 2018-09-26 DIAGNOSIS — F33.1 MODERATE EPISODE OF RECURRENT MAJOR DEPRESSIVE DISORDER (HCC): Primary | ICD-10-CM

## 2018-09-26 PROCEDURE — 99213 OFFICE O/P EST LOW 20 MIN: CPT | Performed by: FAMILY MEDICINE

## 2018-09-26 RX ORDER — VENLAFAXINE HYDROCHLORIDE 150 MG/1
CAPSULE, EXTENDED RELEASE ORAL
Refills: 0 | COMMUNITY
Start: 2018-09-17 | End: 2018-11-13 | Stop reason: SDUPTHER

## 2018-09-27 NOTE — PROGRESS NOTES
Assessment/Plan:    No problem-specific Assessment & Plan notes found for this encounter  Diagnoses and all orders for this visit:    Moderate episode of recurrent major depressive disorder ([address] )    Anxiety    Other orders  -     venlafaxine (EFFEXOR-XR) 150 mg 24 hr capsule;         Subjective:      Patient ID: Esteban Ramírez is a 66 y o  female  This is a follow-up appointment for a 79-year-old patient with a long history of depression  The patient was seen 1 week ago and referred to the emergency room secondary to abdominal pain  Her exam and workup in the emergency room was within normal limits and she was discharged home  The patient recently saw her psychiatrist in her Effexor was increased to 150 mg a day  The patient denies any new physical complaints today  The following portions of the patient's history were reviewed and updated as appropriate: allergies, current medications, past family history, past medical history, past social history, past surgical history and problem list     Review of Systems   Constitutional: Negative  Respiratory: Negative  Cardiovascular: Negative  Musculoskeletal: Negative  Psychiatric/Behavioral:        Patient has a overall depressed mood  Objective:      /86 (BP Location: Left arm, Patient Position: Sitting, Cuff Size: Standard)   Pulse 83   Temp 97 7 °F (36 5 °C)   Resp 18   Ht 5' 1" (1 549 m)   Wt 69 4 kg (153 lb)   SpO2 97%   BMI 28 91 kg/m²          Physical Exam   Constitutional: She is oriented to person, place, and time  She appears well-developed and well-nourished  Patient is overweight with a BMI of 28 91   Cardiovascular: Normal rate and regular rhythm  Pulmonary/Chest: Effort normal and breath sounds normal    Musculoskeletal:   The patient walks with a cane  Neurological: She is alert and oriented to person, place, and time     Psychiatric: Judgment and thought content normal    The patient has a depressed affect  Vitals reviewed

## 2018-09-27 NOTE — PATIENT INSTRUCTIONS
The patient will continue her antidepressant medications which include Effexor 150 mg daily  She will follow up with her counselor at [facility] next week  At this point she does not need any other physical workup done  She does not need any new labs ordered at this point  The patient was advised to follow up with me in 6-8 weeks for chronic medical problems

## 2018-10-04 ENCOUNTER — OFFICE VISIT (OUTPATIENT)
Dept: HEMATOLOGY ONCOLOGY | Facility: MEDICAL CENTER | Age: 78
End: 2018-10-04
Payer: MEDICARE

## 2018-10-04 VITALS
HEART RATE: 75 BPM | BODY MASS INDEX: 28.51 KG/M2 | TEMPERATURE: 98.2 F | OXYGEN SATURATION: 95 % | DIASTOLIC BLOOD PRESSURE: 98 MMHG | RESPIRATION RATE: 18 BRPM | SYSTOLIC BLOOD PRESSURE: 162 MMHG | WEIGHT: 151 LBS | HEIGHT: 61 IN

## 2018-10-04 DIAGNOSIS — C50.812 MALIGNANT NEOPLASM OF OVERLAPPING SITES OF LEFT BREAST IN FEMALE, ESTROGEN RECEPTOR NEGATIVE (HCC): Primary | ICD-10-CM

## 2018-10-04 DIAGNOSIS — Z17.1 MALIGNANT NEOPLASM OF OVERLAPPING SITES OF LEFT BREAST IN FEMALE, ESTROGEN RECEPTOR NEGATIVE (HCC): Primary | ICD-10-CM

## 2018-10-04 PROCEDURE — 99213 OFFICE O/P EST LOW 20 MIN: CPT | Performed by: INTERNAL MEDICINE

## 2018-10-04 NOTE — PROGRESS NOTES
Jo Rodriguez  1940  [address] [facility] [place] [id number]    DISCUSSION/SUMMARY:    49-year-old female with history of left-sided stage IIA breast cancer status post bilateral mastectomy in March 2010  Mrs Placido Mitchell has a number of other active medical problems but at this time, there is no evidence of breast cancer progression  Mrs Placido Mitchell is to continue with anterior chest wall exams (mammograms not indicated)  Mrs Placido Mitchell is to return here in 12 months  Routine blood work or scans in the absence of any signs or symptoms looking for evidence of metastatic breast cancer is not indicated  Patient knows to call the office if she has any other oncology questions or concerns  Patient knows to call the office if she has any other oncology questions or concerns  Carefully review your medication list and verify that the list is accurate and up-to-date  Please call the hematology/oncology office if there are medications missing from the list, medications on the list that you are not currently taking or if there is a dosage or instruction that is different from how you're taking that medication  Patient goals and areas of care:   Breast cancer surveillance  Patient is able to self-care   _____________________________________________________________________________________    Chief Complaint   Patient presents with    Follow-up     Stage IIA left breast cancer on surveillance     History of Present Illness:    49-year-old female with history of stage IIA (pT2 N0 M0 R0 G2 ER/AL negative, HER-2/bere = 2+/equivocal FISH =1 4 = not amplified) left breast cancer status post bilateral mastectomy in March 2010 (bilateral mastectomy was patient's request) back for followup  No adjuvant treatment was given (patient's choice)  Mrs Placido Mitchell states feeling okay, baseline    Patient was in the emergency room in mid August 2018 with abdominal pain  Workup was nondiagnostic  Since that time, patient denies any issues with pain, nausea, vomiting, diarrhea or constipation  Appetite is okay  No fevers, chills or sweats  No headaches, dizziness or syncopal episodes  Generalized body aches are the same as before  As before, fatigue is the biggest quality of life issue  Activities are limited but the same as before  No recent infections  Review of Systems   Constitutional: Positive for fatigue  HENT: Negative  Eyes: Negative  Respiratory: Negative  Cardiovascular: Negative  Gastrointestinal: Negative  Endocrine: Negative  Genitourinary: Negative  Musculoskeletal: Negative  Skin: Negative  Allergic/Immunologic: Negative  Neurological: Negative  Hematological: Negative  Psychiatric/Behavioral: Negative  All other systems reviewed and are negative       Patient Active Problem List   Diagnosis    Iron deficiency anemia secondary to inadequate dietary iron intake    Dyspnea on exertion    Iron deficiency anemia    Moderate episode of recurrent major depressive disorder (HCC)    Acquired hypothyroidism    Anxiety    Generalized abdominal pain    Inflammatory bowel disease    Hypomagnesemia    Fibromyalgia    Chronic pain syndrome    Hernia of abdominal wall    Generalized pain    Vitamin D deficiency     Past Medical History:   Diagnosis Date    Acute hyperglycemia     Anxiety     Arthritis     Vickie-Dawson    Benign polyp of large intestine     [facility]    [facility]    Cat-scratch disease     [facility]ctive tissue disease ([facility]     DDD (degenerative disc disease), lumbar     Depression     Disease of thyroid gland     hypo    Diverticulitis large intestine     Feared complaint without diagnosis     last assessed: 1/16/17    Fibromyalgia     Full dentures     upper and lower    Genital warts     Hemorrhoids     Herniated lumbar intervertebral disc     History of transfusion     s/p diverticultits perf  emergency surgery    Hypertension     Irritable bowel syndrome     Lyme disease     neurological residuals, feels ill daily, exhausted    Lymphedema     Rectocele     last assessed: 12    Shortness of breath     exertional    Skin pruritus     last assessed: 17    Spinal stenosis, lumbar     Urinary frequency     last assessed: 7/21/15    Wears glasses      Past Surgical History:   Procedure Laterality Date    APPENDECTOMY      during bowel surgery    BREAST SURGERY Left     CA in duct, needle bx     SECTION      CHOLECYSTECTOMY      open    COLON SURGERY      emergency perf diverticulitis, colostomy    COLON SURGERY      reversal of colostomy post rupture left side    COLONOSCOPY      3/1/11 Dr Hester Media: flexible sigmoidoscopy-ileosigmoid anastomotic ulcer (active mucositis with suppurative inflammatory exudate treated with Asacol and retention enema  )  08 Dr Talbot for RLQ abdominal pain: diverticulosis, otherwise normal rectum  Pain likely due to adhesions  06 Dr Mir Yarbrough for abdominal Pain: sigmoid hyperplastic polyp, f/u 3 years   ESOPHAGOGASTRODUODENOSCOPY N/A 2016    Procedure: ESOPHAGOGASTRODUODENOSCOPY (EGD) and biopsy, Flex Sig;  Surgeon: Mary Jamison MD;  Location: [facility] GI LAB; Service:     EYE SURGERY Bilateral 2010    cataract with IOL    HERNIA REPAIR      x2 abdominal, x1 incisional    HYSTERECTOMY      age 29 post fall/ trauma/ emergency    LUMBAR EPIDURAL INJECTION      x4 L1-5    MASTECTOMY, RADICAL Bilateral     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, 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, no radiation or chemo    WI COLSC FLX W/RMVL OF TUMOR POLYP LESION SNARE TQ N/A 4/3/2018    Procedure: COLONOSCOPY;  Surgeon: Ave Schmidt MD;  Location: [facility] GI LAB; Service: Gastroenterology    WI EGD TRANSORAL BIOPSY SINGLE/MULTIPLE N/A 4/3/2018    Procedure: ESOPHAGOGASTRODUODENOSCOPY (EGD);   Surgeon: Ave Schmidt MD;  [facility]; Service: Gastroenterology    RESECTION TONSIL RADICAL       Family History   Problem Relation Age of Onset    Heart disease Mother     Diverticulosis Mother     Emphysema Father     Cancer Sister         lung and breast, carcinoma of the pancreas    Heart disease Sister     Parkinsonism Brother     Lung cancer Brother         late 42's     Social History     Social History    Marital status: Single     Spouse name: N/A    Number of children: N/A    Years of education: N/A     Occupational History    Retired      Social History Main Topics    Smoking status: Never Smoker    Smokeless tobacco: Never Used    Alcohol use Yes      Comment: Social    Drug use: No    Sexual activity: Not on file     Other Topics Concern    Not on file     Social History Narrative    Advance directive on file    [employer] states both  and Single       Current Outpatient Prescriptions:     ALPRAZolam (XANAX) 0 5 mg tablet, Take 1 tablet (0 5 mg total) by mouth 3 (three) times a day as needed for anxiety, Disp: 90 tablet, Rfl: 1    Cholecalciferol (VITAMIN D-3 PO), Take 1 tablet by mouth every evening , Disp: , Rfl:     dexlansoprazole (DEXILANT) 60 MG capsule, Take 60 mg by mouth every morning , Disp: , Rfl:     divalproex sodium (DEPAKOTE) 125 mg EC tablet, Take 125 mg by mouth daily at bedtime  , Disp: , Rfl:     docusate sodium (COLACE) 100 mg capsule, Take 100 mg by mouth daily at bedtime  , Disp: , Rfl:     HYDROcodone-acetaminophen (XODOL) 7 5-300 MG per tablet, TAKE ONE TABLET BY MOUTH EVERY SIX HOURS AS NEEDED FOR PAIN, Disp: 45 tablet, Rfl: 0    levothyroxine 25 mcg tablet, Take 1 tablet (25 mcg total) by mouth daily, Disp: 90 tablet, Rfl: 3    LIALDA 1 2 g EC tablet, Take 2,400 mg by mouth daily  , Disp: , Rfl:     losartan (COZAAR) 50 mg tablet, Take 1 tablet (50 mg total) by mouth daily, Disp: 90 tablet, Rfl: 3    venlafaxine (EFFEXOR-XR) 150 mg 24 hr capsule, , Disp: , Rfl: 0    venlafaxine (EFFEXOR-XR) 75 mg 24 hr capsule, Take 150 mg by mouth every morning  , Disp: , Rfl:     Allergies   Allergen Reactions    Duricef [Cefadroxil] GI Intolerance    Iodides     Latex Rash    Sulfa Antibiotics Rash       Vitals:    10/04/18 1549   BP: 162/98   Pulse: 75   Resp: 18   Temp: 98 2 °F (36 8 °C)   SpO2: 95%     Physical Exam   Constitutional: She is oriented to person, place, and time  She appears well-developed and well-nourished  HENT:   Head: Normocephalic and atraumatic  Right Ear: External ear normal    Left Ear: External ear normal    Nose: Nose normal    Mouth/Throat: Oropharynx is clear and moist    Eyes: Pupils are equal, round, and reactive to light  Conjunctivae and EOM are normal    Neck: Normal range of motion  Neck supple  Cardiovascular: Normal rate, regular rhythm, normal heart sounds and intact distal pulses  Pulmonary/Chest: Effort normal and breath sounds normal    Abdominal: Soft  Bowel sounds are normal    Soft, nontender, +bowel sounds, no hepatosplenomegaly   Musculoskeletal: Normal range of motion  Neurological: She is alert and oriented to person, place, and time  She has normal reflexes  Skin: Skin is warm  Good color, warm, moist, no petechiae or ecchymoses   Psychiatric: She has a normal mood and affect   Her behavior is normal  Judgment and thought content normal    Bilateral anterior chest wall with to well-healed scars, no nodules or redness, no axillary adenopathy (female present during exam)  Extremities:   3+ bilateral lower extremity edema (chronic), pulses are decreased, no obvious cords  Lymphatics:  No adenopathy in the neck, supraclavicular region, axilla and groin bilaterally    Performance Status: 0 - Asymptomatic    Labs:    09/13/2018 WBC = 5 3 hemoglobin = 12 6 hematocrit = 39 1 MCV = 95 platelet = 404 neutrophil = 67% BUN = 15 creatinine = 0 81 calcium = 9 7 AST = 20 ALT = 18 alkaline phosphatase = 62 total bilirubin = 0 40    1/25/17 BUN = 19 creatinine = 0 9 calcium = 10 3 iron = 70 LDH = 180 alkaline phosphatase = 66 LFTs WNL WBC = 4 7 hemoglobin = 11 1 hematocrit = 34 platelet = 086 sedimentation rate = 42 = high    Imaging    09/13/2018 CT scan of the abdomen/pelvis    No acute CT abnormality in the abdomen or pelvis to account for the patient's symptoms  No significant interval change when compared to May 15, 2017  No suspicious hernia or bowel obstruction

## 2018-10-09 ENCOUNTER — TELEPHONE (OUTPATIENT)
Dept: FAMILY MEDICINE CLINIC | Facility: CLINIC | Age: 78
End: 2018-10-09

## 2018-10-10 NOTE — TELEPHONE ENCOUNTER
Pt is calling about chills, sweating and wants to know what it could be  She thinks she is losing all her fluids, frequent urination  Wants a call back tomorrow from you

## 2018-10-12 DIAGNOSIS — M79.7 FIBROMYALGIA: ICD-10-CM

## 2018-10-12 DIAGNOSIS — K21.9 GASTROESOPHAGEAL REFLUX DISEASE WITHOUT ESOPHAGITIS: Primary | ICD-10-CM

## 2018-10-12 RX ORDER — DEXLANSOPRAZOLE 60 MG/1
60 CAPSULE, DELAYED RELEASE ORAL EVERY MORNING
Qty: 30 CAPSULE | Refills: 5 | Status: SHIPPED | OUTPATIENT
Start: 2018-10-12 | End: 2019-04-09 | Stop reason: SDUPTHER

## 2018-10-12 RX ORDER — HYDROCODONE BITARTRATE AND ACETAMINOPHEN 7.5; 3 MG/1; MG/1
TABLET ORAL
Qty: 45 TABLET | Refills: 0 | Status: SHIPPED | OUTPATIENT
Start: 2018-10-12 | End: 2018-11-06 | Stop reason: SDUPTHER

## 2018-10-12 NOTE — TELEPHONE ENCOUNTER
DR Riley Qureshi - PT HAS 8 LEFT  SHE WOULD ALSO LIKE YOU TO KNOW THAT SHE HAS A PRESCRIPTION FOR LEVOTHYROXINE INSTEAD OF SYNTHROID  SHE WANTS TO KNOW IF IT'S OK TO TAKE

## 2018-11-06 DIAGNOSIS — F41.9 ANXIETY: ICD-10-CM

## 2018-11-06 DIAGNOSIS — M79.7 FIBROMYALGIA: ICD-10-CM

## 2018-11-06 RX ORDER — HYDROCODONE BITARTRATE AND ACETAMINOPHEN 7.5; 3 MG/1; MG/1
TABLET ORAL
Qty: 45 TABLET | Refills: 0 | Status: SHIPPED | OUTPATIENT
Start: 2018-11-06 | End: 2018-12-03 | Stop reason: SDUPTHER

## 2018-11-06 RX ORDER — ALPRAZOLAM 0.5 MG/1
0.5 TABLET ORAL 3 TIMES DAILY PRN
Qty: 90 TABLET | Refills: 0 | Status: SHIPPED | OUTPATIENT
Start: 2018-11-06 | End: 2018-12-03 | Stop reason: SDUPTHER

## 2018-11-08 ENCOUNTER — TELEPHONE (OUTPATIENT)
Dept: FAMILY MEDICINE CLINIC | Facility: CLINIC | Age: 78
End: 2018-11-08

## 2018-11-08 ENCOUNTER — OFFICE VISIT (OUTPATIENT)
Dept: FAMILY MEDICINE CLINIC | Facility: CLINIC | Age: 78
End: 2018-11-08
Payer: MEDICARE

## 2018-11-08 VITALS
HEART RATE: 85 BPM | RESPIRATION RATE: 18 BRPM | SYSTOLIC BLOOD PRESSURE: 148 MMHG | TEMPERATURE: 97.6 F | BODY MASS INDEX: 29.14 KG/M2 | OXYGEN SATURATION: 95 % | WEIGHT: 154.2 LBS | DIASTOLIC BLOOD PRESSURE: 90 MMHG

## 2018-11-08 DIAGNOSIS — M79.7 FIBROMYALGIA: ICD-10-CM

## 2018-11-08 DIAGNOSIS — J30.1 ALLERGIC RHINITIS DUE TO POLLEN, UNSPECIFIED SEASONALITY: Primary | ICD-10-CM

## 2018-11-08 DIAGNOSIS — E03.9 ACQUIRED HYPOTHYROIDISM: ICD-10-CM

## 2018-11-08 DIAGNOSIS — I10 ESSENTIAL HYPERTENSION: ICD-10-CM

## 2018-11-08 DIAGNOSIS — Z23 INFLUENZA VACCINATION ADMINISTERED AT CURRENT VISIT: ICD-10-CM

## 2018-11-08 PROCEDURE — 99214 OFFICE O/P EST MOD 30 MIN: CPT | Performed by: FAMILY MEDICINE

## 2018-11-08 PROCEDURE — G0008 ADMIN INFLUENZA VIRUS VAC: HCPCS | Performed by: FAMILY MEDICINE

## 2018-11-08 PROCEDURE — 90662 IIV NO PRSV INCREASED AG IM: CPT | Performed by: FAMILY MEDICINE

## 2018-11-08 RX ORDER — CETIRIZINE HYDROCHLORIDE 10 MG/1
10 TABLET ORAL DAILY
Qty: 30 TABLET | Refills: 5 | Status: SHIPPED | OUTPATIENT
Start: 2018-11-08 | End: 2018-11-14

## 2018-11-08 RX ORDER — RIFAXIMIN 550 MG/1
TABLET ORAL
Refills: 2 | COMMUNITY
Start: 2018-10-26

## 2018-11-08 RX ORDER — OLOPATADINE HYDROCHLORIDE 2 MG/ML
SOLUTION/ DROPS OPHTHALMIC
Refills: 1 | COMMUNITY
Start: 2018-10-02 | End: 2019-12-26 | Stop reason: SDUPTHER

## 2018-11-08 NOTE — PROGRESS NOTES
Assessment/Plan:    No problem-specific Assessment & Plan notes found for this encounter  Diagnoses and all orders for this visit:    Allergic rhinitis due to pollen, unspecified seasonality  -     cetirizine (ZyrTEC) 10 mg tablet; Take 1 tablet (10 mg total) by mouth daily    Fibromyalgia  -     diclofenac sodium (VOLTAREN) 1 %; Apply 2 g topically 4 (four) times a day    Acquired hypothyroidism    Influenza vaccination administered at current visit    Essential hypertension        Subjective:      Patient ID: Andrew Diez is a 66 y o  female  This is a follow-up appointment for 70-year-old white female with a history of fibromyalgia, depression, hypertension, hypothyroidism, and allergic rhinitis  The patient states her allergies are still problematic  She had recent blood work done which was reviewed with her today  She denies any new problems  The following portions of the patient's history were reviewed and updated as appropriate: allergies, current medications, past family history, past medical history, past social history, past surgical history and problem list     Review of Systems   Constitutional: Negative  HENT: Negative  Eyes: Positive for redness and itching  Respiratory: Negative  Cardiovascular: Negative  Musculoskeletal: Positive for arthralgias  Allergic/Immunologic: Positive for environmental allergies  Neurological: Negative  Psychiatric/Behavioral: Positive for sleep disturbance  Objective:      /90 (BP Location: Left arm, Patient Position: Sitting, Cuff Size: Large)   Pulse 85   Temp 97 6 °F (36 4 °C) (Tympanic)   Resp 18   Wt 69 9 kg (154 lb 3 2 oz)   SpO2 95%   BMI 29 14 kg/m²          Physical Exam   Constitutional: She is oriented to person, place, and time  She appears well-developed and well-nourished     The patient is overweight with a BMI of 29 14   Eyes:   The patient has erythematous conjunctiva and also has allergic shiners around her eyes  Cardiovascular: Normal rate, regular rhythm, normal heart sounds and intact distal pulses  Pulmonary/Chest: Effort normal and breath sounds normal    Musculoskeletal:   The patient walks slowly with a cane secondary to her fibromyalgia   Neurological: She is alert and oriented to person, place, and time  Psychiatric: She has a normal mood and affect   Her behavior is normal  Judgment and thought content normal

## 2018-11-08 NOTE — PATIENT INSTRUCTIONS
Patient's blood pressure is stable with her present medications which she will continue  Patient has constant pain secondary to fibromyalgia and she will try a Diclofenac  sodium gel to arthritic joints  The patient will continue her present hypothyroid medicines to control this problem  The patient will also continue use the name brand Zyrtec for her allergic rhinitis  The patient was advised that her allergy symptoms may continue until the 1st snow fall  The patient did receive a flu shot if this appointment  Patient will follow up with me in 3 months for chronic medical problems

## 2018-11-08 NOTE — TELEPHONE ENCOUNTER
DR Lane Duncan - PT WAS JUST SEEN  SHE SAID HER INS CO WON'T COVER CERTIRIZINE  SHE SAID THEY WILL COVER XYZAL  SHE WOULD LIKE ANOTHER SCRIPT SENT TO [facility] Bonnie Oconnor

## 2018-11-13 DIAGNOSIS — F32.A DEPRESSION, UNSPECIFIED DEPRESSION TYPE: Primary | ICD-10-CM

## 2018-11-13 NOTE — TELEPHONE ENCOUNTER
Dr Merline Mujica  Patient call to have the following refilled  Effexor-xr  Says she has 3 left  Thank you   samson

## 2018-11-14 DIAGNOSIS — J30.1 SEASONAL ALLERGIC RHINITIS DUE TO POLLEN: Primary | ICD-10-CM

## 2018-11-14 RX ORDER — LEVOCETIRIZINE DIHYDROCHLORIDE 5 MG/1
5 TABLET, FILM COATED ORAL EVERY EVENING
Qty: 30 TABLET | Refills: 5 | Status: SHIPPED | OUTPATIENT
Start: 2018-11-14 | End: 2020-07-27

## 2018-11-14 RX ORDER — VENLAFAXINE HYDROCHLORIDE 150 MG/1
150 CAPSULE, EXTENDED RELEASE ORAL DAILY
Qty: 30 CAPSULE | Refills: 5 | Status: SHIPPED | OUTPATIENT
Start: 2018-11-14 | End: 2019-04-09 | Stop reason: SDUPTHER

## 2018-11-14 NOTE — TELEPHONE ENCOUNTER
Dr Deven Kasper-  S/w pt and she is concerned about the weather tomorrow and getting out to get her meds, would like to know if you are able to fill this Rx today

## 2018-12-03 DIAGNOSIS — F41.9 ANXIETY: ICD-10-CM

## 2018-12-03 DIAGNOSIS — M79.7 FIBROMYALGIA: ICD-10-CM

## 2018-12-03 RX ORDER — HYDROCODONE BITARTRATE AND ACETAMINOPHEN 7.5; 3 MG/1; MG/1
TABLET ORAL
Qty: 45 TABLET | Refills: 0 | Status: SHIPPED | OUTPATIENT
Start: 2018-12-03 | End: 2018-12-31 | Stop reason: SDUPTHER

## 2018-12-03 RX ORDER — ALPRAZOLAM 0.5 MG/1
0.5 TABLET ORAL 3 TIMES DAILY PRN
Qty: 90 TABLET | Refills: 0 | Status: SHIPPED | OUTPATIENT
Start: 2018-12-03 | End: 2018-12-31 | Stop reason: SDUPTHER

## 2018-12-07 ENCOUNTER — TELEPHONE (OUTPATIENT)
Dept: FAMILY MEDICINE CLINIC | Facility: CLINIC | Age: 78
End: 2018-12-07

## 2018-12-07 DIAGNOSIS — D64.9 ANEMIA, UNSPECIFIED TYPE: Primary | ICD-10-CM

## 2018-12-07 NOTE — TELEPHONE ENCOUNTER
DR Ribeiro Finger - PT ISN'T FEELING TOO GOOD  SHE IS HAVING BLACK STOOLS AGAIN, AND WEAKNESS  SHE THINKS  HER BLOOD COUNT IS DOWN    SHE WANTS YOU TO ORDER BW ON HER  SHE WOULD LIKE TO SPEAK TO  YOU    DIDN'T WANT TO SPEAK TO A NURSE  SHE ALSO SAID HER SIDE IS HURTING

## 2018-12-10 ENCOUNTER — TELEPHONE (OUTPATIENT)
Dept: FAMILY MEDICINE CLINIC | Facility: CLINIC | Age: 78
End: 2018-12-10

## 2018-12-10 ENCOUNTER — APPOINTMENT (OUTPATIENT)
Dept: LAB | Facility: CLINIC | Age: 78
End: 2018-12-10
Payer: MEDICARE

## 2018-12-10 DIAGNOSIS — D64.9 ANEMIA, UNSPECIFIED TYPE: ICD-10-CM

## 2018-12-10 DIAGNOSIS — E03.9 ACQUIRED HYPOTHYROIDISM: ICD-10-CM

## 2018-12-10 DIAGNOSIS — E55.9 VITAMIN D DEFICIENCY: ICD-10-CM

## 2018-12-10 DIAGNOSIS — D50.9 IRON DEFICIENCY ANEMIA, UNSPECIFIED IRON DEFICIENCY ANEMIA TYPE: ICD-10-CM

## 2018-12-10 LAB
25(OH)D3 SERPL-MCNC: 31.2 NG/ML (ref 30–100)
ERYTHROCYTE [DISTWIDTH] IN BLOOD BY AUTOMATED COUNT: 13.1 % (ref 11.6–15.1)
FERRITIN SERPL-MCNC: 34 NG/ML (ref 8–388)
HCT VFR BLD AUTO: 39.8 % (ref 34.8–46.1)
HGB BLD-MCNC: 12.8 G/DL (ref 11.5–15.4)
IRON SERPL-MCNC: 115 UG/DL (ref 50–170)
MCH RBC QN AUTO: 31.1 PG (ref 26.8–34.3)
MCHC RBC AUTO-ENTMCNC: 32.2 G/DL (ref 31.4–37.4)
MCV RBC AUTO: 97 FL (ref 82–98)
PLATELET # BLD AUTO: 207 THOUSANDS/UL (ref 149–390)
PMV BLD AUTO: 11 FL (ref 8.9–12.7)
RBC # BLD AUTO: 4.12 MILLION/UL (ref 3.81–5.12)
TSH SERPL DL<=0.05 MIU/L-ACNC: 1.43 UIU/ML (ref 0.36–3.74)
WBC # BLD AUTO: 5.76 THOUSAND/UL (ref 4.31–10.16)

## 2018-12-10 PROCEDURE — 83540 ASSAY OF IRON: CPT

## 2018-12-10 PROCEDURE — 82306 VITAMIN D 25 HYDROXY: CPT

## 2018-12-10 PROCEDURE — 36415 COLL VENOUS BLD VENIPUNCTURE: CPT

## 2018-12-10 PROCEDURE — 82728 ASSAY OF FERRITIN: CPT

## 2018-12-10 PROCEDURE — 84443 ASSAY THYROID STIM HORMONE: CPT

## 2018-12-10 PROCEDURE — 85027 COMPLETE CBC AUTOMATED: CPT

## 2018-12-31 DIAGNOSIS — M79.7 FIBROMYALGIA: ICD-10-CM

## 2018-12-31 DIAGNOSIS — F41.9 ANXIETY: ICD-10-CM

## 2019-01-01 RX ORDER — HYDROCODONE BITARTRATE AND ACETAMINOPHEN 7.5; 3 MG/1; MG/1
TABLET ORAL
Qty: 45 TABLET | Refills: 0 | Status: SHIPPED | OUTPATIENT
Start: 2019-01-01 | End: 2019-02-04 | Stop reason: SDUPTHER

## 2019-01-01 RX ORDER — ALPRAZOLAM 0.5 MG/1
0.5 TABLET ORAL 3 TIMES DAILY PRN
Qty: 90 TABLET | Refills: 0 | Status: SHIPPED | OUTPATIENT
Start: 2019-01-01 | End: 2019-02-04 | Stop reason: SDUPTHER

## 2019-01-16 ENCOUNTER — OFFICE VISIT (OUTPATIENT)
Dept: FAMILY MEDICINE CLINIC | Facility: CLINIC | Age: 79
End: 2019-01-16
Payer: COMMERCIAL

## 2019-01-16 VITALS
OXYGEN SATURATION: 98 % | HEIGHT: 61 IN | HEART RATE: 93 BPM | SYSTOLIC BLOOD PRESSURE: 140 MMHG | WEIGHT: 155 LBS | BODY MASS INDEX: 29.27 KG/M2 | RESPIRATION RATE: 18 BRPM | DIASTOLIC BLOOD PRESSURE: 82 MMHG | TEMPERATURE: 97.6 F

## 2019-01-16 DIAGNOSIS — T14.8XXA MUSCLE STRAIN: ICD-10-CM

## 2019-01-16 DIAGNOSIS — I10 ESSENTIAL HYPERTENSION: Primary | ICD-10-CM

## 2019-01-16 PROCEDURE — 99213 OFFICE O/P EST LOW 20 MIN: CPT | Performed by: FAMILY MEDICINE

## 2019-01-17 NOTE — PROGRESS NOTES
Assessment/Plan:    No problem-specific Assessment & Plan notes found for this encounter  Diagnoses and all orders for this visit:    Essential hypertension    Muscle strain        Subjective:      Patient ID: Kim Pradhan is a 66 y o  female  This is a 70-year-old white female who states she has had several days complaint of pain in her axilla region  She denies any fevers  The patient states that she has recently been cleaning windows and the pain may be related to that activity  She denies any other new complaints  The following portions of the patient's history were reviewed and updated as appropriate: allergies, current medications, past family history, past medical history, past social history, past surgical history and problem list     Review of Systems   Constitutional: Negative  Respiratory: Negative  Cardiovascular: Negative  Endocrine: Negative  Musculoskeletal: Positive for myalgias  Axilla and shoulder pain bilaterally  Neurological: Negative  Objective:      /82 (BP Location: Left arm, Patient Position: Sitting, Cuff Size: Large)   Pulse 93   Temp 97 6 °F (36 4 °C)   Resp 18   Ht 5' 1" (1 549 m)   Wt 70 3 kg (155 lb)   SpO2 98%   BMI 29 29 kg/m²          Physical Exam   Constitutional: She is oriented to person, place, and time  She appears well-developed and well-nourished  The patient is overweight with a BMI of 29 29   Cardiovascular: Normal rate, regular rhythm and normal heart sounds  Pulmonary/Chest: Effort normal and breath sounds normal    Musculoskeletal:   Normal range of motion of both shoulders     Mild tenderness to her axilla region but no evidence of adenopathy  Neurological: She is alert and oriented to person, place, and time  Psychiatric: She has a normal mood and affect  Her behavior is normal  Judgment and thought content normal    Vitals reviewed

## 2019-01-17 NOTE — PATIENT INSTRUCTIONS
Patient's blood  pressure is stable with her present medications  The patient has mild muscle strains to her shoulders probably secondary to window cleaning  The patient was advised to rest and take Tylenol for these complaints  The patient cannot tolerate anti-inflammatories secondary to her history of GERD

## 2019-01-23 ENCOUNTER — TELEPHONE (OUTPATIENT)
Dept: FAMILY MEDICINE CLINIC | Facility: CLINIC | Age: 79
End: 2019-01-23

## 2019-01-23 NOTE — TELEPHONE ENCOUNTER
PT CALLED,HAS BEEN ITCHY ALL OVER SINCE LAST NIGHT AND HER HEAD FEELS HOT  BENEDRYL DID NOT HELP,IS THERE SOMETHING ELSE SHE CAN DO ?

## 2019-01-24 NOTE — TELEPHONE ENCOUNTER
Spoke with patient who does not have any rash just very dry itchy skin  She has tried multiple skin moisturizer with no successes  Advised to try plain oil or mineral oil apply after shower or bath to damp skin and then pat dry  Also to use a humidifier at night to help with her dry red irritated eyes/ skin  She will call if problem doesn't  resolve for appt

## 2019-01-25 ENCOUNTER — TELEPHONE (OUTPATIENT)
Dept: FAMILY MEDICINE CLINIC | Facility: CLINIC | Age: 79
End: 2019-01-25

## 2019-01-29 ENCOUNTER — TELEPHONE (OUTPATIENT)
Dept: FAMILY MEDICINE CLINIC | Facility: CLINIC | Age: 79
End: 2019-01-29

## 2019-01-29 NOTE — TELEPHONE ENCOUNTER
Dr Giovanni Woodward-  Pt wanted to inform you that she had a dizzy spell last night and wanted to you let know   It only lasted a few mins and passed

## 2019-02-04 DIAGNOSIS — M79.7 FIBROMYALGIA: ICD-10-CM

## 2019-02-04 DIAGNOSIS — F41.9 ANXIETY: ICD-10-CM

## 2019-02-04 DIAGNOSIS — E03.9 ACQUIRED HYPOTHYROIDISM: ICD-10-CM

## 2019-02-04 RX ORDER — LEVOTHYROXINE SODIUM 0.03 MG/1
25 TABLET ORAL DAILY
Qty: 90 TABLET | Refills: 3 | Status: SHIPPED | OUTPATIENT
Start: 2019-02-04 | End: 2019-05-16 | Stop reason: SDUPTHER

## 2019-02-04 RX ORDER — ALPRAZOLAM 0.5 MG/1
0.5 TABLET ORAL 3 TIMES DAILY PRN
Qty: 90 TABLET | Refills: 0 | Status: SHIPPED | OUTPATIENT
Start: 2019-02-04 | End: 2019-05-07 | Stop reason: SDUPTHER

## 2019-02-04 RX ORDER — HYDROCODONE BITARTRATE AND ACETAMINOPHEN 7.5; 3 MG/1; MG/1
TABLET ORAL
Qty: 45 TABLET | Refills: 0 | Status: SHIPPED | OUTPATIENT
Start: 2019-02-04 | End: 2019-02-25 | Stop reason: SDUPTHER

## 2019-02-06 ENCOUNTER — OFFICE VISIT (OUTPATIENT)
Dept: FAMILY MEDICINE CLINIC | Facility: CLINIC | Age: 79
End: 2019-02-06
Payer: MEDICARE

## 2019-02-06 ENCOUNTER — TELEPHONE (OUTPATIENT)
Dept: FAMILY MEDICINE CLINIC | Facility: CLINIC | Age: 79
End: 2019-02-06

## 2019-02-06 VITALS
OXYGEN SATURATION: 98 % | SYSTOLIC BLOOD PRESSURE: 144 MMHG | RESPIRATION RATE: 18 BRPM | BODY MASS INDEX: 29.07 KG/M2 | DIASTOLIC BLOOD PRESSURE: 88 MMHG | WEIGHT: 154 LBS | HEIGHT: 61 IN | TEMPERATURE: 98.1 F | HEART RATE: 94 BPM

## 2019-02-06 DIAGNOSIS — R55 NEAR SYNCOPE: ICD-10-CM

## 2019-02-06 DIAGNOSIS — I10 ESSENTIAL HYPERTENSION: Primary | ICD-10-CM

## 2019-02-06 PROCEDURE — 99214 OFFICE O/P EST MOD 30 MIN: CPT | Performed by: FAMILY MEDICINE

## 2019-02-06 RX ORDER — LOSARTAN POTASSIUM 25 MG/1
25 TABLET ORAL DAILY
Qty: 30 TABLET | Refills: 5 | Status: SHIPPED | OUTPATIENT
Start: 2019-02-06 | End: 2019-07-17 | Stop reason: SDUPTHER

## 2019-02-06 NOTE — PROGRESS NOTES
Assessment/Plan:    No problem-specific Assessment & Plan notes found for this encounter  Diagnoses and all orders for this visit:    Essential hypertension  -     losartan (COZAAR) 25 mg tablet; Take 1 tablet (25 mg total) by mouth daily    Near syncope        Subjective:      Patient ID: Denny Martinez is a 66 y o  female  This 66-year-old female patient states approximately 1 week ago she had an episode where she nearly fainted when washing dishes  Patient states that she became dizzy and fell backwards against the wall  She describes the episode as blacking out'  The she denies any other incidents since that time  She notes that her blood pressure was elevated at her GI doctor's office and mildly elevated again today  She denies any other new complaints  Patient does have an appointment with a vascular doctors office on Friday  The following portions of the patient's history were reviewed and updated as appropriate: allergies, current medications, past family history, past medical history, past social history, past surgical history and problem list     Review of Systems   Constitutional: Negative  Respiratory: Negative  Cardiovascular: Negative  Musculoskeletal: Negative  Neurological: Positive for dizziness and light-headedness  Psychiatric/Behavioral: Negative  Objective:      /88 (BP Location: Left arm, Patient Position: Sitting, Cuff Size: Adult)   Pulse 94   Temp 98 1 °F (36 7 °C) (Tympanic)   Resp 18   Ht 5' 1" (1 549 m)   Wt 69 9 kg (154 lb)   SpO2 98%   BMI 29 10 kg/m²          Physical Exam   Constitutional: She is oriented to person, place, and time  She appears well-developed and well-nourished  The patient is overweight with BMI 29 10   HENT:   Head: Normocephalic  Cardiovascular: Normal rate, regular rhythm and normal heart sounds      Pulmonary/Chest: Effort normal and breath sounds normal    Musculoskeletal:   Patient uses a cane to ambulate secondary to her diagnosis of fibromyalgia   Neurological: She is alert and oriented to person, place, and time  Psychiatric: She has a normal mood and affect  Her behavior is normal  Judgment and thought content normal    Vitals reviewed

## 2019-02-06 NOTE — PATIENT INSTRUCTIONS
The patient was diagnosis with the near syncope  Her blood pressure was also mildly elevated today  Blood pressure medications were adjusted and Cozaar 25 mg was added to the present 50 mg she takes for hypertension control  The patient was asked to follow up with me in 1 month to recheck her blood pressure  Patient will keep the appointment with the vascular her physician's office on Friday  Patient was offered a set of orders that included carotid Doppler and MRI of the brain prior to that appointment on Friday but she chose to wait to that office appointment to decide about the tests    The

## 2019-02-07 NOTE — PROGRESS NOTES
Assessment/Plan:    Near syncope    66 F HTN, HLD, total colectomy (around age 29) for diverticular disease, IBS and multiple musculoskeletal complaints  She reports that she was in her usual, compensated state health when she got up from the kitchen table walked over to the sink  There she was washing dishes when suddenly she felt as though she could pass out, but did not actually pass out or lose consciousness  She noted sensation in the base of neck/back of head followed by lightheadedness with black, tunnel vision and feeling off balance  She recalls grabbing to counter to hold on and not fall which was difficult due to functional use of her left arm (baseline severe OA and limited ROM of L shoulder ) She felt that she could fall or pass out  She stood on her feet until the episode passed which she believes was "2 minutes " She denies actual dizziness or spinning  No unilateral visual disturbance  She had no associated symptoms of chest pain or palpitations  No cardiac awareness  She denies any similar symptoms   (I notice in the chart notes that she told Dr Vinicius Sparks that she was "dizzy, blacking out" and fell backwards against wall which she denies to me ) No recent cardiac work up or testing  Baseline, lots of complaints of fatigue, weakness, easy sob on exertional  + occasional L sided palpitations  Exam:    Negative orthostatics - SBPs 140/ HR's 70's lying, sitting, standing  RRR S1S2 with occasional extra beats  2+ radial, DP pulses  3+ LE edema c/w lymphedema    Discussion:    We had a detailed discussion regarding Monica's history and recent episode of near syncope  The episode of near-syncope which she describes sounds more consistent with vasovagal episode rather than carotid or TIA  She has had the one episode of near-syncope and no jaleesa syncope  She has no known cardiovascular disease  She is a lifelong nonsmoker  Her symptoms unlikely due to carotid disease    However, for completeness we should check a carotid duplex  I explained to her that should she feel lightheaded again, she ought to get herself immediately to the floor or couch as to avoid passing out and getting hurt  She tells me that getting to floor would be difficult given her musculoskeletal complaints  Further, I would like her to see Dr Luis Ren, Cardiology, particularly if she would have another episode for cardiac workup - perhaps a cardiac monitor and stress test   She actually has a cardiology appointment coming up soon  On her examination today she does have occasional extra beats on exam  Orthostatic vitals are negative     -check carotid duplex - we will call her with result  -follow-up with cardiology  -patient education provided for near syncope  -patient education provided for TIA/stroke  -follow up in 1 year or sooner, if problems    Lymphedema of both lower extremities    -Marked, chronic lymphedema since she has in her 25s; lymphedema currently not controlled  -She complains bitterly of uncontrolled edema with bothersome daily itching, aching, tired legs  -She is using lymphedema pumps once daily which I suggested perhaps she try in increase to twice daily usage   -Unable to tolerate compression; as she has severe arthritis she likely will not be able to get them on  We cut her Tubigrips to try to control edema with instruction on usage  -consider formal physical therapy with lymph massage for lymphedema and multiple musculoskeletal complaints  I offered her referral but she wants to discuss with PCP  -We can see her as needed for this problem           Hypertension  -monitor blood pressures at home and report to PCP  -Cozaar recently increased from 50-75 by PCP  -not orthostatic in the office today    Shortness of breath of exertion  -follow up with cardiology      Subjective:      Patient ID: Denny Martinez is a 66 y o  female      Pt is new to our practice and reports that Pt is here for evaluation of possible carotid stenosis  Pt had some visual disturbances until her B/P medication was raised within the past couple of days  Pt denies any other CVA/TIA symptoms  Pt's last CTA was 9/13/18  Pt is not currently taking any blood thinners  HPI Ms Andrew Diez is a 66 F HTN, HLD, total colectomy (around age 29) for diverticular disease, IBS and multiple musculoskeletal complaints  She was recently seen by Dr Jennifer Cordoba and descried a near syncopal episode with concern for possible carotid disease  Katherin Watsonpriscilla tells me that she was in her usual, compensated state health when she got up from the kitchen table walked over to the sink  There she was washing dishes when suddenly she felt as though she could pass out  She noted sensation in the base of neck/back of head followed by lightheadedness with black, tunnel vision and feeling off balance  She recalls grabbing to counter to hold on and not fall which was difficult due to functional use of her left arm (baseline severe OA and limited ROM of L shoulder ) Though she felt that she could pass out, she did not  She stood on her feet until the episode passed which she believes was "2 minutes " She denies actual dizziness or spinning  No unilateral visual disturbance  She had no associated symptoms of chest pain or palpitations  No cardiac awareness  She denies any similar symptoms   (I notice in the chart notes that she told Dr Jennifer Cordoba that she was "dizzy, blacking out" and fell backwards against wall which she denies to me ) She tells me Dr Mikaela Canseco is her cardiologist   She had a cardiac catheterization which was "ok" but that was 20 years ago  No recent testing  Antioneanand Watsonpriscilla uses a cane and walks very slowly due to gait abnormality  She describes severe osteoarthritis including bone-on-bone at the knees  She also has severe degenerative disc disease  She has multiple musculoskeletal complaints and joint problems of shoulders, hands, hips, knees with fibromyalgia    She had Lyme disease in 2010  She complains of uncontrolled, chronic lymphedema  She complains that her legs are weak and tired and itch all the time  She gives history of lymphedema since age 29 when she had a hysterectomy  She has limb from pumps at home which she reports she uses once daily  In general, she reports that she is tired all the time and has developed very easy shortness of breath with activities  She is a lifelong non-smoker  The following portions of the patient's history were reviewed and updated as appropriate: allergies, current medications, past family history, past medical history, past social history, past surgical history and problem list     Review of systems reviewed by me  Review of Systems   Constitutional: Positive for chills  HENT: Positive for ear pain and trouble swallowing  Eyes: Positive for pain  Respiratory: Negative  Cardiovascular: Positive for leg swelling  Gastrointestinal: Negative  Endocrine: Negative  Genitourinary: Negative  Musculoskeletal: Positive for back pain, gait problem and neck pain  Skin: Negative  Allergic/Immunologic: Positive for environmental allergies  Neurological: Positive for headaches  Hematological: Negative  Psychiatric/Behavioral: Negative  Objective:    /88 (BP Location: Right arm, Patient Position: Sitting, Cuff Size: Adult)   Pulse 84   Temp 97 5 °F (36 4 °C) (Tympanic)   Resp 16   Ht 5' (1 524 m)   Wt 71 7 kg (158 lb)   BMI 30 86 kg/m²         L   146/88  R   142/88    LEFT arm orthostatics - no symptoms, orthostatics negative    Flat  142/86, 72  Sit 1 min 144/86, 72  Sit 3 min 144/82, 72  Stand 1 146/84  Stand  3 144/80       Physical Exam   Constitutional: She is oriented to person, place, and time  She appears well-developed and well-nourished  She is cooperative  Obese    Uses cane    Significant gait abnormality     HENT:   Head: Normocephalic and atraumatic     Eyes: Pupils are equal, round, and reactive to light  EOM are normal    Neck: Trachea normal  Neck supple  No JVD present  No thyromegaly present  Cardiovascular: Normal rate, regular rhythm, S1 normal, S2 normal and normal heart sounds  Exam reveals no gallop and no friction rub  No murmur heard  Pulses:       Carotid pulses are 2+ on the right side, and 2+ on the left side  Radial pulses are 2+ on the right side, and 2+ on the left side  Dorsalis pedis pulses are 2+ on the right side, and 2+ on the left side  Blood pressure is taking in both arms as well as orthostatic blood pressures were negative  Orthostatic blood pressures with systolic blood pressures in the 140s lying, sitting and standing  Heart rates remained stable in the 70s       + overall regular heart rhythm with occasional skipped beats    Marked, 3+ bilateral lower extremity edema consistent with lymphedema; mild skin changes   Pulmonary/Chest: Effort normal and breath sounds normal  No accessory muscle usage  No respiratory distress  She has no wheezes  She has no rales  Abdominal: Soft  Bowel sounds are normal  She exhibits no distension  There is no hepatosplenomegaly  There is no tenderness  Musculoskeletal: She exhibits edema  She exhibits no deformity  Decreased range of motion shoulders, hips, knees  Difficulty putting on and taking off her jacket  Great difficulty getting up on the table for examination  Gait dysfunction   Neurological: She is alert and oriented to person, place, and time  Grossly normal    Skin: Skin is warm and dry  No lesion and no rash noted  No cyanosis  Nails show no clubbing  Psychiatric: She has a normal mood and affect  Nursing note and vitals reviewed          Vitals:    02/08/19 1054 02/08/19 1058   BP: 142/86 146/88   BP Location: Left arm Right arm   Patient Position: Sitting Sitting   Cuff Size: Adult Adult   Pulse: 84    Resp: 16    Temp: 97 5 °F (36 4 °C)    TempSrc: Tympanic Weight: 71 7 kg (158 lb)    Height: 5' (1 524 m)        Patient Active Problem List   Diagnosis    Iron deficiency anemia secondary to inadequate dietary iron intake    Dyspnea on exertion    Iron deficiency anemia    Moderate episode of recurrent major depressive disorder (HCC)    Acquired hypothyroidism    Anxiety    Generalized abdominal pain    Inflammatory bowel disease    Hypomagnesemia    Fibromyalgia    Chronic pain syndrome    Hernia of abdominal wall    Generalized pain    Vitamin D deficiency    Malignant neoplasm of overlapping sites of left breast in female, estrogen receptor negative (HCC)    Allergic rhinitis due to pollen    Influenza vaccination administered at current visit    Essential hypertension    Muscle strain    Near syncope    Lymphedema of both lower extremities       Past Surgical History:   Procedure Laterality Date    APPENDECTOMY      during bowel surgery    BREAST SURGERY Left     CA in duct, needle bx     SECTION      CHOLECYSTECTOMY      open    COLON SURGERY      emergency perf diverticulitis, colostomy    COLON SURGERY      reversal of colostomy post rupture left side    COLONOSCOPY      3/1/11 Dr Beltran Covert: flexible sigmoidoscopy-ileosigmoid anastomotic ulcer (active mucositis with suppurative inflammatory exudate treated with Asacol and retention enema  )  08 Dr Talbot for RLQ abdominal pain: diverticulosis, otherwise normal rectum  Pain likely due to adhesions  06 Dr Beltran Covert for abdominal Pain: sigmoid hyperplastic polyp, f/u 3 years   ESOPHAGOGASTRODUODENOSCOPY N/A 2016    Procedure: ESOPHAGOGASTRODUODENOSCOPY (EGD) and biopsy, Flex Sig;  Surgeon: Zhane Calvillo MD;  Location: [facility];   Service:     EYE SURGERY Bilateral     cataract with IOL    HERNIA REPAIR      x2 abdominal, x1 incisional    HYSTERECTOMY      age 29 post fall/ trauma/ emergency    LUMBAR EPIDURAL INJECTION      x4 L1-5    MASTECTOMY, RADICAL Bilateral     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, 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, no radiation or chemo    DC COLSC FLX W/RMVL OF TUMOR POLYP LESION SNARE TQ N/A 4/3/2018    Procedure: COLONOSCOPY;  Surgeon: Diana Tejeda MD;  Location: [facility]; Service: Gastroenterology    DC EGD TRANSORAL BIOPSY SINGLE/MULTIPLE N/A 4/3/2018    Procedure: ESOPHAGOGASTRODUODENOSCOPY (EGD); Surgeon: Diana Tejeda MD;  Location: [facility];   Service: Gastroenterology    RESECTION TONSIL RADICAL         Family History   Problem Relation Age of Onset    Heart disease Mother     Diverticulosis Mother     Emphysema Father     Cancer Sister         lung and breast, carcinoma of the pancreas    Heart disease Sister     Parkinsonism Brother     Lung cancer Brother         late 42's       Social History     Social History    Marital status: Single     Spouse name: N/A    Number of children: N/A    Years of education: N/A     Occupational History    Retired      Social History Main Topics    Smoking status: Never Smoker    Smokeless tobacco: Never Used    Alcohol use Yes      Comment: Social    Drug use: No    Sexual activity: Not on file     Other Topics Concern    Not on file     Social History Narrative    Advance directive on file    [employer] states both  and Single       Allergies   Allergen Reactions    Duricef [Cefadroxil] GI Intolerance    Iodides     Latex Rash    Sulfa Antibiotics Rash         Current Outpatient Prescriptions:     ALPRAZolam (XANAX) 0 5 mg tablet, Take 1 tablet (0 5 mg total) by mouth 3 (three) times a day as needed for anxiety, Disp: 90 tablet, Rfl: 0    Cholecalciferol (VITAMIN D-3 PO), Take 1 tablet by mouth every evening , Disp: , Rfl:     dexlansoprazole (DEXILANT) 60 MG capsule, Take 1 capsule (60 mg total) by mouth every morning, Disp: 30 capsule, Rfl: 5    diclofenac sodium (VOLTAREN) 1 %, Apply 2 g topically 4 (four) times a day, Disp: 1 Tube, Rfl: 5   divalproex sodium (DEPAKOTE) 125 mg EC tablet, Take 125 mg by mouth daily at bedtime  , Disp: , Rfl:     docusate sodium (COLACE) 100 mg capsule, Take 100 mg by mouth daily at bedtime  , Disp: , Rfl:     HYDROcodone-acetaminophen (XODOL) 7 5-300 MG per tablet, One tab every 6 hours as needed, Disp: 45 tablet, Rfl: 0    levocetirizine (XYZAL) 5 MG tablet, Take 1 tablet (5 mg total) by mouth every evening, Disp: 30 tablet, Rfl: 5    levothyroxine 25 mcg tablet, Take 1 tablet (25 mcg total) by mouth daily, Disp: 90 tablet, Rfl: 3    losartan (COZAAR) 25 mg tablet, Take 1 tablet (25 mg total) by mouth daily, Disp: 30 tablet, Rfl: 5    losartan (COZAAR) 50 mg tablet, Take 1 tablet (50 mg total) by mouth daily, Disp: 90 tablet, Rfl: 3    olopatadine HCl (PATADAY) 0 2 % opth drops, , Disp: , Rfl: 1    venlafaxine (EFFEXOR-XR) 150 mg 24 hr capsule, Take 1 capsule (150 mg total) by mouth daily, Disp: 30 capsule, Rfl: 5    XIFAXAN 550 MG tablet, , Disp: , Rfl: 2

## 2019-02-08 ENCOUNTER — CONSULT (OUTPATIENT)
Dept: VASCULAR SURGERY | Facility: CLINIC | Age: 79
End: 2019-02-08
Payer: MEDICARE

## 2019-02-08 VITALS
BODY MASS INDEX: 31.02 KG/M2 | RESPIRATION RATE: 16 BRPM | HEART RATE: 84 BPM | SYSTOLIC BLOOD PRESSURE: 146 MMHG | HEIGHT: 60 IN | WEIGHT: 158 LBS | TEMPERATURE: 97.5 F | DIASTOLIC BLOOD PRESSURE: 88 MMHG

## 2019-02-08 DIAGNOSIS — I89.0 LYMPHEDEMA OF BOTH LOWER EXTREMITIES: ICD-10-CM

## 2019-02-08 DIAGNOSIS — R55 NEAR SYNCOPE: Primary | ICD-10-CM

## 2019-02-08 PROCEDURE — 99214 OFFICE O/P EST MOD 30 MIN: CPT | Performed by: PHYSICIAN ASSISTANT

## 2019-02-08 NOTE — LETTER
February 8, 2019     Manoj Lyons, [address]    Patient: Nancy Boyce   YOB: 1940   Date of Visit: 2/8/2019     Dear Dr Esha Ludwig      Thank you for referring Breanna Briones to me for evaluation  Below are the relevant portions of my assessment and plan of care  If you have questions, please do not hesitate to call me  I look forward to following Elenaranjeet Hollowayg along with you  Sincerely,        Anel Carpenter PA-C        CC: No Recipients    Progress Notes:      Near syncope    66 F HTN, HLD, total colectomy (around age 29) for diverticular disease, IBS and multiple musculoskeletal complaints  She reports that she was in her usual, compensated state health when she got up from the kitchen table walked over to the sink  There she was washing dishes when suddenly she felt as though she could pass out, but did not actually pass out or lose consciousness  She noted sensation in the base of neck/back of head followed by lightheadedness with black, tunnel vision and feeling off balance  She recalls grabbing to counter to hold on and not fall which was difficult due to functional use of her left arm (baseline severe OA and limited ROM of L shoulder ) She felt that she could fall or pass out  She stood on her feet until the episode passed which she believes was "2 minutes " She denies actual dizziness or spinning  No unilateral visual disturbance  She had no associated symptoms of chest pain or palpitations  No cardiac awareness  She denies any similar symptoms   (I notice in the chart notes that she told Dr Esha Ludwig that she was "dizzy, blacking out" and fell backwards against wall which she denies to me ) No recent cardiac work up or testing  Baseline, lots of complaints of fatigue, weakness, easy sob on exertional  + occasional L sided palpitations      Exam:    Negative orthostatics  RRR S1S2 with occasional extra beats  2+ radial, DP pulses  3+ LE edema c/w lymphedema    Discussion:    We had a detailed discussion regarding Monica's history and recent episode of near syncope  The episode of near-syncope which she describes sounds more consistent with vasovagal episode rather than carotid or TIA  She has had the one episode of near-syncope and no jaleesa syncope  She has no known cardiovascular disease  She is a lifelong nonsmoker  Her symptoms unlikely due to carotid disease  However, for completeness we should check a carotid duplex  I explained to her that should she feel lightheaded again, she ought to get herself immediately to the floor or couch as to avoid passing out and getting hurt  She tells me that getting to floor would be difficult given her musculoskeletal complaints  Further, I would like her to see Dr Elkin Ng, Cardiology, particularly if she would have another episode for cardiac workup - perhaps a cardiac monitor and stress test   She actually has a cardiology appointment coming up soon  On her examination today she does have occasional extra beats on exam  Orthostatic vitals are negative     -check carotid duplex - we will call her with result  -follow-up with cardiology  -patient education provided for near syncope  -patient education provided for TIA/stroke  -follow up in 1 year or sooner, if problems    Lymphedema of both lower extremities    -Marked, chronic lymphedema since she has in her 25s; lymphedema currently not controlled  -She complains bitterly of uncontrolled edema with bothersome daily itching, aching, tired legs  -She is using lymphedema pumps once daily which I suggested perhaps she try in increase to twice daily usage   -Unable to tolerate compression; as she has severe arthritis she likely will not be able to get them on  We cut her Tubigrips to try to control edema with instruction on usage  -consider formal physical therapy with lymph massage for lymphedema and multiple musculoskeletal complaints   I offered her referral but she wants to discuss with PCP  -We can see her as needed for this problem

## 2019-02-08 NOTE — PATIENT INSTRUCTIONS
Lightheadedness and near-syncope    We had a detailed discussion regarding Monica's history and recent episode of near syncope  The episode of near-syncope which she describes sounds more consistent with vasovagal episode rather than carotid or TIA  She has had the one episode of near-syncope and no jaleesa syncope  She has no known cardiovascular disease  She is a lifelong nonsmoker  Her symptoms unlikely due to carotid disease  However, for completeness we should check a carotid duplex  I explained to her that should she feel lightheaded again, she ought to get herself immediately to the floor or couch as to avoid passing out and getting hurt  She tells me that this would be difficult given her musculoskeletal complaints  Further, I would like her to see Dr Alexandria Han, Cardiology particularly if she would have another episode for cardiac workup - perhaps a cardiac monitor and stress test   She has a cardiology appointment coming up soon  On her examination today she does have occasional early systoles  She tells me that she has had occasional momentary palpitations      -check carotid duplex  -follow-up with cardiology  -call right away should she experience another episode of near-syncope  -call 911 for any visual disturbance, difficulty speaking or weakness in an arm or leg    Lymphedema  -marked, chronic lymphedema since she has in her 25s  -varicose veins with itching, aching, tired legs  -she is using lymphedema pumps once daily which I suggested perhaps she try twice daily  -unable to tolerate compression and with arthritis she likely will not be able to get them on    We will cut her a Tubigrip to try to control edema  -consider formal physical therapy with lymph massage    Hypertension  -monitor blood pressures at home and report to PCP  -Cozaar recently increased from 50-75 by PCP  -not orthostatic in the office today    Shortness of breath of exertion  -follow up with cardiology              Near Syncope   WHAT YOU NEED TO KNOW:   Near syncope, also called presyncope, is the feeling that you may faint (lose consciousness), but you do not  You can control some health conditions that cause near syncope  Your healthcare providers can help you create a plan to manage near syncope and prevent episodes  DISCHARGE INSTRUCTIONS:   Return to the emergency department if:   · You have sudden chest pain  · You have trouble breathing or shortness of breath  · You have vision changes, are sweating, and have nausea while you are sitting or lying down  · You feel dizzy or flushed and your heart is fluttering  · You lose consciousness  · You cannot use your arm, hand, foot, or leg, or it feels weak  · You have trouble speaking or understanding others when they speak  Contact your healthcare provider if:   · You have new or worsening symptoms  · Your heart beats faster or slower than usual      · You have questions or concerns about your condition or care  Follow up with your healthcare provider as directed: You may need more tests to help find the cause of your near syncope episodes  The tests will help healthcare providers plan the best treatment for you  Write down your questions so you remember to ask them during your visits  Manage near syncope:   · Sit or lie down when needed  This includes when you feel dizzy, your throat is getting tight, and your vision changes  Raise your legs above the level of your heart  · Take slow, deep breaths if you start to breathe faster with anxiety or fear  This can help decrease dizziness and the feeling that you might faint  · Keep a record of your near syncope episodes  Include your symptoms and your activity before and after the episode  The record can help your healthcare provider find the cause of your near syncope and help you manage episodes    Prevent a near syncope episode:   · Move slowly and let yourself get used to one position before you move to another position  This is very important when you change from a lying or sitting position to a standing position  Take some deep breaths before you stand up from a lying position  Stand up slowly  Sudden movements may cause a fainting spell  Sit on the side of the bed or couch for a few minutes before you stand up  If you are on bedrest, try to be upright for about 2 hours each day, or as directed  Do not lock your legs if you are standing for a long period of time  Move your legs and bend your knees to keep blood flowing  · Follow your healthcare provider's recommendations  Your provider may  recommend that you drink more liquids to prevent dehydration  You may also need to have more salt to keep your blood pressure from dropping too low and causing syncope  Your provider will tell you how much liquid and sodium to have each day  · Watch for signs of low blood sugar  These include hunger, nervousness, sweating, and fast or fluttery heartbeats  Talk with your healthcare provider about ways to keep your blood sugar level steady  · Check your blood pressure often  This is important if you take medicine to lower your blood pressure  Check your blood pressure when you are lying down and when you are standing  Ask how often to check during the day  Keep a record of your blood pressure numbers  Your healthcare provider may use the record to help plan your treatment  · Do not strain if you are constipated  You may faint if you strain to have a bowel movement  Walking is the best way to get your bowels moving  Eat foods high in fiber to make it easier to have a bowel movement  Good examples are high-fiber cereals, beans, vegetables, and whole-grain breads  Prune juice may help make bowel movements softer  · Do not exercise outside on a hot day  The combination of physical activity and heat can lead to dehydration  This can cause syncope    © 2017 [employer] Information is for End User's use only and may not be sold, redistributed or otherwise used for commercial purposes  All illustrations and images included in CareNotes® are the copyrighted property of A D [employer] M [employer]  or Karl Ledesma  The above information is an  only  It is not intended as medical advice for individual conditions or treatments  Talk to your doctor, nurse or pharmacist before following any medical regimen to see if it is safe and effective for you

## 2019-02-08 NOTE — ASSESSMENT & PLAN NOTE
66 F HTN, HLD, total colectomy (around age 29) for diverticular disease, IBS and multiple musculoskeletal complaints  She reports that she was in her usual, compensated state health when she got up from the kitchen table walked over to the sink  There she was washing dishes when suddenly she felt as though she could pass out, but did not actually pass out or lose consciousness  She noted sensation in the base of neck/back of head followed by lightheadedness with black, tunnel vision and feeling off balance  She recalls grabbing to counter to hold on and not fall which was difficult due to functional use of her left arm (baseline severe OA and limited ROM of L shoulder ) She felt that she could fall or pass out  She stood on her feet until the episode passed which she believes was "2 minutes " She denies actual dizziness or spinning  No unilateral visual disturbance  She had no associated symptoms of chest pain or palpitations  No cardiac awareness  She denies any similar symptoms   (I notice in the chart notes that she told Dr Hue Hood that she was "dizzy, blacking out" and fell backwards against wall which she denies to me ) No recent cardiac work up or testing  Baseline, lots of complaints of fatigue, weakness, easy sob on exertional  + occasional L sided palpitations  Exam:    Negative orthostatics - SBPs 140/ HR's 70's lying, sitting, standing  RRR S1S2 with occasional extra beats  2+ radial, DP pulses  3+ LE edema c/w lymphedema    Discussion:    We had a detailed discussion regarding Monica's history and recent episode of near syncope  The episode of near-syncope which she describes sounds more consistent with vasovagal episode rather than carotid or TIA  She has had the one episode of near-syncope and no jaleesa syncope  She has no known cardiovascular disease  She is a lifelong nonsmoker  Her symptoms unlikely due to carotid disease    However, for completeness we should check a carotid duplex  I explained to her that should she feel lightheaded again, she ought to get herself immediately to the floor or couch as to avoid passing out and getting hurt  She tells me that getting to floor would be difficult given her musculoskeletal complaints  Further, I would like her to see Dr Amira Harvey, Cardiology, particularly if she would have another episode for cardiac workup - perhaps a cardiac monitor and stress test   She actually has a cardiology appointment coming up soon   On her examination today she does have occasional extra beats on exam  Orthostatic vitals are negative     -check carotid duplex - we will call her with result  -follow-up with cardiology  -patient education provided for near syncope  -patient education provided for TIA/stroke  -follow up in 1 year or sooner, if problems

## 2019-02-08 NOTE — ASSESSMENT & PLAN NOTE
-Marked, chronic lymphedema since she has in her 25s; lymphedema currently not controlled  -She complains bitterly of uncontrolled edema with bothersome daily itching, aching, tired legs  -She is using lymphedema pumps once daily which I suggested perhaps she try in increase to twice daily usage   -Unable to tolerate compression; as she has severe arthritis she likely will not be able to get them on  We cut her Tubigrips to try to control edema with instruction on usage  -consider formal physical therapy with lymph massage for lymphedema and multiple musculoskeletal complaints   I offered her referral but she wants to discuss with PCP  -We can see her as needed for this problem

## 2019-02-19 ENCOUNTER — TELEPHONE (OUTPATIENT)
Dept: FAMILY MEDICINE CLINIC | Facility: CLINIC | Age: 79
End: 2019-02-19

## 2019-02-19 NOTE — TELEPHONE ENCOUNTER
Dr Uzma Westfall-  S/w pt states she is having depression since sat, and now is not feeling well  States her temp is 96 0 and she is also c/o sinus pressure and pain   I offered an appt, states she does not feel well enough to drive, she would like a call back    [phone]

## 2019-02-20 NOTE — TELEPHONE ENCOUNTER
I spoke to the pt and she possibly has a viral infection  Fluids recommended    Pt will use fluticasone nasal spray for now

## 2019-02-25 DIAGNOSIS — M79.7 FIBROMYALGIA: ICD-10-CM

## 2019-02-25 RX ORDER — HYDROCODONE BITARTRATE AND ACETAMINOPHEN 7.5; 3 MG/1; MG/1
TABLET ORAL
Qty: 45 TABLET | Refills: 0 | Status: SHIPPED | OUTPATIENT
Start: 2019-02-25 | End: 2019-03-25 | Stop reason: SDUPTHER

## 2019-02-26 ENCOUNTER — HOSPITAL ENCOUNTER (OUTPATIENT)
Dept: RADIOLOGY | Facility: HOSPITAL | Age: 79
Discharge: HOME | End: 2019-02-26
Payer: MEDICARE

## 2019-02-26 ENCOUNTER — TRANSCRIBE ORDERS (OUTPATIENT)
Dept: ADMINISTRATIVE | Facility: HOSPITAL | Age: 79
End: 2019-02-26

## 2019-02-26 DIAGNOSIS — R55 NEAR SYNCOPE: ICD-10-CM

## 2019-02-26 PROCEDURE — 93880 EXTRACRANIAL BILAT STUDY: CPT

## 2019-02-28 ENCOUNTER — TELEPHONE (OUTPATIENT)
Dept: FAMILY MEDICINE CLINIC | Facility: CLINIC | Age: 79
End: 2019-02-28

## 2019-02-28 PROCEDURE — 93880 EXTRACRANIAL BILAT STUDY: CPT | Performed by: SURGERY

## 2019-03-05 DIAGNOSIS — F41.9 ANXIETY: ICD-10-CM

## 2019-03-07 RX ORDER — ALPRAZOLAM 0.5 MG/1
0.5 TABLET ORAL 3 TIMES DAILY PRN
Qty: 90 TABLET | Refills: 1 | Status: SHIPPED | OUTPATIENT
Start: 2019-03-07 | End: 2019-04-15 | Stop reason: SDUPTHER

## 2019-03-25 DIAGNOSIS — M79.7 FIBROMYALGIA: ICD-10-CM

## 2019-03-27 RX ORDER — HYDROCODONE BITARTRATE AND ACETAMINOPHEN 7.5; 3 MG/1; MG/1
TABLET ORAL
Qty: 45 TABLET | Refills: 0 | Status: SHIPPED | OUTPATIENT
Start: 2019-03-27 | End: 2019-04-15 | Stop reason: SDUPTHER

## 2019-04-03 ENCOUNTER — OFFICE VISIT (OUTPATIENT)
Dept: FAMILY MEDICINE CLINIC | Facility: CLINIC | Age: 79
End: 2019-04-03
Payer: MEDICARE

## 2019-04-03 VITALS
WEIGHT: 156 LBS | OXYGEN SATURATION: 96 % | DIASTOLIC BLOOD PRESSURE: 90 MMHG | RESPIRATION RATE: 18 BRPM | HEART RATE: 79 BPM | HEIGHT: 60 IN | TEMPERATURE: 97.7 F | SYSTOLIC BLOOD PRESSURE: 140 MMHG | BODY MASS INDEX: 30.63 KG/M2

## 2019-04-03 DIAGNOSIS — G89.4 CHRONIC PAIN SYNDROME: ICD-10-CM

## 2019-04-03 DIAGNOSIS — I10 ESSENTIAL HYPERTENSION: ICD-10-CM

## 2019-04-03 DIAGNOSIS — R11.0 NAUSEA: Primary | ICD-10-CM

## 2019-04-03 DIAGNOSIS — R10.32 LEFT LOWER QUADRANT PAIN: ICD-10-CM

## 2019-04-03 PROCEDURE — 99214 OFFICE O/P EST MOD 30 MIN: CPT | Performed by: FAMILY MEDICINE

## 2019-04-03 RX ORDER — FLUTICASONE PROPIONATE 50 MCG
SPRAY, SUSPENSION (ML) NASAL
COMMUNITY
End: 2020-04-01

## 2019-04-03 RX ORDER — SENNA PLUS 8.6 MG/1
1 TABLET ORAL DAILY
COMMUNITY

## 2019-04-03 RX ORDER — ONDANSETRON 4 MG/1
4 TABLET, FILM COATED ORAL EVERY 8 HOURS PRN
Qty: 20 TABLET | Refills: 1 | Status: SHIPPED | OUTPATIENT
Start: 2019-04-03 | End: 2020-02-09

## 2019-04-03 RX ORDER — SENNA PLUS 8.6 MG/1
1 TABLET ORAL DAILY
COMMUNITY
End: 2019-04-03 | Stop reason: CLARIF

## 2019-04-09 DIAGNOSIS — K21.9 GASTROESOPHAGEAL REFLUX DISEASE WITHOUT ESOPHAGITIS: ICD-10-CM

## 2019-04-09 DIAGNOSIS — F32.A DEPRESSION, UNSPECIFIED DEPRESSION TYPE: ICD-10-CM

## 2019-04-12 RX ORDER — VENLAFAXINE HYDROCHLORIDE 150 MG/1
CAPSULE, EXTENDED RELEASE ORAL
Qty: 30 CAPSULE | Refills: 5 | Status: SHIPPED | OUTPATIENT
Start: 2019-04-12 | End: 2019-05-16 | Stop reason: SDUPTHER

## 2019-04-15 DIAGNOSIS — F41.9 ANXIETY: ICD-10-CM

## 2019-04-15 DIAGNOSIS — M79.7 FIBROMYALGIA: ICD-10-CM

## 2019-04-16 RX ORDER — HYDROCODONE BITARTRATE AND ACETAMINOPHEN 7.5; 3 MG/1; MG/1
TABLET ORAL
Qty: 40 TABLET | Refills: 0 | Status: SHIPPED | OUTPATIENT
Start: 2019-04-16 | End: 2019-05-07 | Stop reason: SDUPTHER

## 2019-04-16 RX ORDER — ALPRAZOLAM 0.5 MG/1
TABLET ORAL
Qty: 60 TABLET | Refills: 1 | Status: SHIPPED | OUTPATIENT
Start: 2019-04-16 | End: 2019-06-19

## 2019-04-17 ENCOUNTER — OFFICE VISIT (OUTPATIENT)
Dept: FAMILY MEDICINE CLINIC | Facility: CLINIC | Age: 79
End: 2019-04-17
Payer: MEDICARE

## 2019-04-17 DIAGNOSIS — M79.7 FIBROMYALGIA: Primary | ICD-10-CM

## 2019-04-17 DIAGNOSIS — F33.1 MODERATE EPISODE OF RECURRENT MAJOR DEPRESSIVE DISORDER (HCC): ICD-10-CM

## 2019-04-17 PROCEDURE — 99213 OFFICE O/P EST LOW 20 MIN: CPT | Performed by: FAMILY MEDICINE

## 2019-05-07 DIAGNOSIS — M79.7 FIBROMYALGIA: ICD-10-CM

## 2019-05-07 DIAGNOSIS — I10 ESSENTIAL HYPERTENSION: ICD-10-CM

## 2019-05-07 DIAGNOSIS — F41.9 ANXIETY: ICD-10-CM

## 2019-05-08 RX ORDER — LOSARTAN POTASSIUM 50 MG/1
50 TABLET ORAL DAILY
Qty: 90 TABLET | Refills: 3 | Status: SHIPPED | OUTPATIENT
Start: 2019-05-08 | End: 2019-08-12 | Stop reason: SDUPTHER

## 2019-05-08 RX ORDER — HYDROCODONE BITARTRATE AND ACETAMINOPHEN 7.5; 3 MG/1; MG/1
TABLET ORAL
Qty: 40 TABLET | Refills: 0 | Status: SHIPPED | OUTPATIENT
Start: 2019-05-08 | End: 2019-05-28 | Stop reason: SDUPTHER

## 2019-05-08 RX ORDER — ALPRAZOLAM 0.5 MG/1
0.5 TABLET ORAL 3 TIMES DAILY PRN
Qty: 90 TABLET | Refills: 0 | Status: SHIPPED | OUTPATIENT
Start: 2019-05-08 | End: 2019-06-10 | Stop reason: SDUPTHER

## 2019-05-10 ENCOUNTER — TELEPHONE (OUTPATIENT)
Dept: FAMILY MEDICINE CLINIC | Facility: CLINIC | Age: 79
End: 2019-05-10

## 2019-05-10 RX ORDER — ALPRAZOLAM 0.5 MG/1
TABLET ORAL
Qty: 60 TABLET | Refills: 1 | Status: CANCELLED | OUTPATIENT
Start: 2019-05-10

## 2019-05-16 DIAGNOSIS — E03.9 ACQUIRED HYPOTHYROIDISM: ICD-10-CM

## 2019-05-16 DIAGNOSIS — K21.9 GASTROESOPHAGEAL REFLUX DISEASE WITHOUT ESOPHAGITIS: ICD-10-CM

## 2019-05-16 DIAGNOSIS — F32.A DEPRESSION, UNSPECIFIED DEPRESSION TYPE: ICD-10-CM

## 2019-05-18 RX ORDER — DEXLANSOPRAZOLE 60 MG/1
CAPSULE, DELAYED RELEASE ORAL
Qty: 30 CAPSULE | Refills: 5 | Status: SHIPPED | OUTPATIENT
Start: 2019-05-18 | End: 2019-11-06 | Stop reason: SDUPTHER

## 2019-05-18 RX ORDER — VENLAFAXINE HYDROCHLORIDE 150 MG/1
150 CAPSULE, EXTENDED RELEASE ORAL DAILY
Qty: 30 CAPSULE | Refills: 5 | Status: SHIPPED | OUTPATIENT
Start: 2019-05-18 | End: 2019-09-20 | Stop reason: SDUPTHER

## 2019-05-18 RX ORDER — LEVOTHYROXINE SODIUM 0.03 MG/1
25 TABLET ORAL DAILY
Qty: 90 TABLET | Refills: 3 | Status: SHIPPED | OUTPATIENT
Start: 2019-05-18 | End: 2019-08-12 | Stop reason: SDUPTHER

## 2019-05-22 ENCOUNTER — OFFICE VISIT (OUTPATIENT)
Dept: FAMILY MEDICINE CLINIC | Facility: CLINIC | Age: 79
End: 2019-05-22
Payer: MEDICARE

## 2019-05-22 VITALS
HEART RATE: 86 BPM | BODY MASS INDEX: 30.86 KG/M2 | DIASTOLIC BLOOD PRESSURE: 84 MMHG | RESPIRATION RATE: 18 BRPM | OXYGEN SATURATION: 99 % | SYSTOLIC BLOOD PRESSURE: 130 MMHG | WEIGHT: 158 LBS

## 2019-05-22 DIAGNOSIS — J30.1 SEASONAL ALLERGIC RHINITIS DUE TO POLLEN: Primary | ICD-10-CM

## 2019-05-22 DIAGNOSIS — M79.7 FIBROMYALGIA: ICD-10-CM

## 2019-05-22 PROCEDURE — 99214 OFFICE O/P EST MOD 30 MIN: CPT | Performed by: FAMILY MEDICINE

## 2019-05-22 RX ORDER — FLUTICASONE PROPIONATE 50 MCG
1 SPRAY, SUSPENSION (ML) NASAL DAILY
Qty: 1 BOTTLE | Refills: 5 | Status: SHIPPED | OUTPATIENT
Start: 2019-05-22 | End: 2019-06-19

## 2019-05-28 DIAGNOSIS — M79.7 FIBROMYALGIA: ICD-10-CM

## 2019-05-28 RX ORDER — HYDROCODONE BITARTRATE AND ACETAMINOPHEN 7.5; 3 MG/1; MG/1
TABLET ORAL
Qty: 40 TABLET | Refills: 0 | Status: SHIPPED | OUTPATIENT
Start: 2019-05-28 | End: 2019-06-17 | Stop reason: SDUPTHER

## 2019-05-29 ENCOUNTER — TELEPHONE (OUTPATIENT)
Dept: FAMILY MEDICINE CLINIC | Facility: CLINIC | Age: 79
End: 2019-05-29

## 2019-06-10 DIAGNOSIS — F41.9 ANXIETY: ICD-10-CM

## 2019-06-11 RX ORDER — ALPRAZOLAM 0.5 MG/1
0.5 TABLET ORAL 3 TIMES DAILY PRN
Qty: 90 TABLET | Refills: 0 | Status: SHIPPED | OUTPATIENT
Start: 2019-06-11 | End: 2019-07-02 | Stop reason: SDUPTHER

## 2019-06-12 ENCOUNTER — TELEPHONE (OUTPATIENT)
Dept: FAMILY MEDICINE CLINIC | Facility: CLINIC | Age: 79
End: 2019-06-12

## 2019-06-13 ENCOUNTER — TELEPHONE (OUTPATIENT)
Dept: FAMILY MEDICINE CLINIC | Facility: CLINIC | Age: 79
End: 2019-06-13

## 2019-06-17 DIAGNOSIS — M79.7 FIBROMYALGIA: ICD-10-CM

## 2019-06-17 RX ORDER — HYDROCODONE BITARTRATE AND ACETAMINOPHEN 7.5; 3 MG/1; MG/1
TABLET ORAL
Qty: 40 TABLET | Refills: 0 | Status: SHIPPED | OUTPATIENT
Start: 2019-06-17 | End: 2019-07-02 | Stop reason: SDUPTHER

## 2019-06-19 ENCOUNTER — OFFICE VISIT (OUTPATIENT)
Dept: FAMILY MEDICINE CLINIC | Facility: CLINIC | Age: 79
End: 2019-06-19
Payer: MEDICARE

## 2019-06-19 VITALS
BODY MASS INDEX: 30.47 KG/M2 | WEIGHT: 156 LBS | SYSTOLIC BLOOD PRESSURE: 128 MMHG | RESPIRATION RATE: 20 BRPM | TEMPERATURE: 97.1 F | OXYGEN SATURATION: 98 % | HEART RATE: 82 BPM | DIASTOLIC BLOOD PRESSURE: 78 MMHG

## 2019-06-19 DIAGNOSIS — F32.A DEPRESSION, UNSPECIFIED DEPRESSION TYPE: ICD-10-CM

## 2019-06-19 DIAGNOSIS — M79.7 FIBROMYALGIA: Primary | ICD-10-CM

## 2019-06-19 DIAGNOSIS — R53.83 FATIGUE, UNSPECIFIED TYPE: ICD-10-CM
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
## 2019-06-19 DIAGNOSIS — E55.9 VITAMIN D DEFICIENCY: ICD-10-CM

## 2019-06-19 PROCEDURE — 99214 OFFICE O/P EST MOD 30 MIN: CPT | Performed by: FAMILY MEDICINE

## 2019-06-19 RX ORDER — DOXEPIN HYDROCHLORIDE 10 MG/1
CAPSULE ORAL
Refills: 0 | COMMUNITY
Start: 2019-05-23 | End: 2019-09-20

## 2019-06-20 ENCOUNTER — TELEPHONE (OUTPATIENT)
Dept: FAMILY MEDICINE CLINIC | Facility: CLINIC | Age: 79
End: 2019-06-20

## 2019-06-20 DIAGNOSIS — R53.83 FATIGUE, UNSPECIFIED TYPE: Primary | ICD-10-CM

## 2019-06-21 ENCOUNTER — APPOINTMENT (OUTPATIENT)
Dept: LAB | Facility: CLINIC | Age: 79
End: 2019-06-21
Payer: MEDICARE

## 2019-06-21 DIAGNOSIS — E55.9 VITAMIN D DEFICIENCY: ICD-10-CM

## 2019-06-21 DIAGNOSIS — R53.83 FATIGUE, UNSPECIFIED TYPE: ICD-10-CM

## 2019-06-21 LAB
25(OH)D3 SERPL-MCNC: 19.7 NG/ML (ref 30–100)
ERYTHROCYTE [DISTWIDTH] IN BLOOD BY AUTOMATED COUNT: 12.8 % (ref 11.6–15.1)
GLUCOSE P FAST SERPL-MCNC: 81 MG/DL (ref 65–99)
HCT VFR BLD AUTO: 38.1 % (ref 34.8–46.1)
HGB BLD-MCNC: 12.1 G/DL (ref 11.5–15.4)
MAGNESIUM SERPL-MCNC: 2.2 MG/DL (ref 1.6–2.6)
MCH RBC QN AUTO: 30.9 PG (ref 26.8–34.3)
MCHC RBC AUTO-ENTMCNC: 31.8 G/DL (ref 31.4–37.4)
MCV RBC AUTO: 97 FL (ref 82–98)
PLATELET # BLD AUTO: 217 THOUSANDS/UL (ref 149–390)
PMV BLD AUTO: 10.7 FL (ref 8.9–12.7)
RBC # BLD AUTO: 3.91 MILLION/UL (ref 3.81–5.12)
TSH SERPL DL<=0.05 MIU/L-ACNC: 2.75 UIU/ML (ref 0.36–3.74)
WBC # BLD AUTO: 4.85 THOUSAND/UL (ref 4.31–10.16)

## 2019-06-21 PROCEDURE — 86617 LYME DISEASE ANTIBODY: CPT

## 2019-06-21 PROCEDURE — 36415 COLL VENOUS BLD VENIPUNCTURE: CPT

## 2019-06-21 PROCEDURE — 82306 VITAMIN D 25 HYDROXY: CPT

## 2019-06-21 PROCEDURE — 84443 ASSAY THYROID STIM HORMONE: CPT

## 2019-06-21 PROCEDURE — 82947 ASSAY GLUCOSE BLOOD QUANT: CPT

## 2019-06-21 PROCEDURE — 83735 ASSAY OF MAGNESIUM: CPT

## 2019-06-21 PROCEDURE — 85027 COMPLETE CBC AUTOMATED: CPT

## 2019-06-21 PROCEDURE — 86618 LYME DISEASE ANTIBODY: CPT

## 2019-06-23 LAB
B BURGDOR IGG SER IA-ACNC: 2.56
B BURGDOR IGM SER IA-ACNC: 0.71

## 2019-06-24 DIAGNOSIS — E55.9 VITAMIN D DEFICIENCY: Primary | ICD-10-CM

## 2019-06-25 LAB
B BURGDOR IGG PATRN SER IB-IMP: POSITIVE
B BURGDOR IGM PATRN SER IB-IMP: POSITIVE
B BURGDOR18KD IGG SER QL IB: PRESENT
B BURGDOR23KD IGG SER QL IB: PRESENT
B BURGDOR23KD IGM SER QL IB: PRESENT
B BURGDOR28KD IGG SER QL IB: ABNORMAL
B BURGDOR30KD IGG SER QL IB: ABNORMAL
B BURGDOR39KD IGG SER QL IB: PRESENT
B BURGDOR39KD IGM SER QL IB: ABNORMAL
B BURGDOR41KD IGG SER QL IB: PRESENT
B BURGDOR41KD IGM SER QL IB: PRESENT
B BURGDOR45KD IGG SER QL IB: ABNORMAL
B BURGDOR58KD IGG SER QL IB: PRESENT
B BURGDOR66KD IGG SER QL IB: ABNORMAL
B BURGDOR93KD IGG SER QL IB: ABNORMAL

## 2019-06-26 ENCOUNTER — TELEPHONE (OUTPATIENT)
Dept: FAMILY MEDICINE CLINIC | Facility: CLINIC | Age: 79
End: 2019-06-26

## 2019-07-02 ENCOUNTER — CONSULT (OUTPATIENT)
Dept: SURGERY | Facility: CLINIC | Age: 79
End: 2019-07-02
Payer: MEDICARE

## 2019-07-02 VITALS
BODY MASS INDEX: 30.63 KG/M2 | HEIGHT: 60 IN | TEMPERATURE: 98.2 F | DIASTOLIC BLOOD PRESSURE: 78 MMHG | SYSTOLIC BLOOD PRESSURE: 120 MMHG | WEIGHT: 156 LBS | HEART RATE: 61 BPM

## 2019-07-02 DIAGNOSIS — F41.9 ANXIETY: ICD-10-CM

## 2019-07-02 DIAGNOSIS — R10.84 GENERALIZED ABDOMINAL PAIN: ICD-10-CM

## 2019-07-02 DIAGNOSIS — K59.04 CHRONIC IDIOPATHIC CONSTIPATION: ICD-10-CM

## 2019-07-02 DIAGNOSIS — F33.1 MODERATE EPISODE OF RECURRENT MAJOR DEPRESSIVE DISORDER (HCC): ICD-10-CM

## 2019-07-02 DIAGNOSIS — K52.9 INFLAMMATORY BOWEL DISEASE: Primary | ICD-10-CM

## 2019-07-02 DIAGNOSIS — D50.8 IRON DEFICIENCY ANEMIA SECONDARY TO INADEQUATE DIETARY IRON INTAKE: ICD-10-CM

## 2019-07-02 DIAGNOSIS — M79.7 FIBROMYALGIA: ICD-10-CM

## 2019-07-02 PROCEDURE — 99215 OFFICE O/P EST HI 40 MIN: CPT | Performed by: SPECIALIST

## 2019-07-02 NOTE — TELEPHONE ENCOUNTER
Dr Kay Fleeting    Alprazolam 0 5mg  Hydrocodone aceptaminophen 7 5-300 mg    [employer]

## 2019-07-02 NOTE — PROGRESS NOTES
History and Physical Examination - General Surgery   [facility]  Karla Mathis 78 y o  female MRN: [id number]  Unit/Bed#:  Encounter: [id number] PCP: Domingo Choe DO    History of Present Illness   Chief Complaint:   Chronic constipation  Generalized abdominal pain muscular pain knee pain hip pain  Poor p o  Appetite  Chronic opioid use    HPI:  Karla Mathis is a 78 y o  female who presents to office with nonspecific abdominal pain  Generalized body ache joint ache  For that patient is taking opioids which should controls her pain but causes constipation   For which patient is taking Senokot every day     Patient is seen the gastroenterologist Dr Alek Welch for inflammatory bowel disease on medication today and has been following him for peptic ulcers    Patient is here for possible removal of a hernia mesh to the left lower quadrant     s/p the sigmoid colectomy colostomy, reversal colostom,  Hernia repair at colostomy, removal of mesh from the colostomy hernia site, then recurrence of hernia  which was repaired with mesh approximately 10 years ago    No sites of infection no cellulitis, recurrence of a hernia at the site of a mesh no signs of obstruction  Last CT scan was done 2018  Tiny fat-containing abdominal wall hernia seen on previous examination is not distinctly identifiable on the current examination        Historical Information   The following portions of the patient's history were reviewed and updated as appropriate  Past Medical History:   Diagnosis Date    Acute hyperglycemia     Anxiety     Arthritis     Vickie-Dawson    Benign polyp of large intestine     Cancer ([facility] [address] ) 2010    breast    Cat-scratch disease     Connective tissue disease ([facility] [address] )     DDD (degenerative disc disease), lumbar     Depression     Disease of thyroid gland     hypo    Diverticulitis large intestine     Feared complaint without diagnosis     last assessed: 1/16/17    Fibromyalgia     Full dentures     upper and lower    Genital warts     Hemorrhoids     Herniated lumbar intervertebral disc     History of transfusion     s/p diverticultits perf  emergency surgery    Hypertension     Irritable bowel syndrome     Lyme disease 2010    neurological residuals, feels ill daily, exhausted    Lymphedema     Rectocele     last assessed: 12    Shortness of breath     exertional    Skin pruritus     last assessed: 17    Spinal stenosis, lumbar     Urinary frequency     last assessed: 7/21/15    Wears glasses      Past Surgical History:   Procedure Laterality Date    APPENDECTOMY      during bowel surgery    BREAST SURGERY Left     CA in duct, needle bx     SECTION      CHOLECYSTECTOMY      open    COLON SURGERY      emergency perf diverticulitis, colostomy    COLON SURGERY      reversal of colostomy post rupture left side    COLONOSCOPY      3/1/11 Dr Juarez Degree: flexible sigmoidoscopy-ileosigmoid anastomotic ulcer (active mucositis with suppurative inflammatory exudate treated with Asacol and retention enema  )  08 Dr Talbot for RLQ abdominal pain: diverticulosis, otherwise normal rectum  Pain likely due to adhesions  06 Dr Juarez Degree for abdominal Pain: sigmoid hyperplastic polyp, f/u 3 years   ESOPHAGOGASTRODUODENOSCOPY N/A 2016    Procedure: ESOPHAGOGASTRODUODENOSCOPY (EGD) and biopsy, Flex Sig;  Surgeon: Gwendolyn Cheadle, MD;  Location: [facility]; Service:     EYE SURGERY Bilateral 2010    cataract with IOL    HERNIA REPAIR      x2 abdominal, x1 incisional    HYSTERECTOMY      age 29 post fall/ trauma/ emergency    LUMBAR EPIDURAL INJECTION      x4 L1-5    MASTECTOMY, RADICAL Bilateral     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, 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, no radiation or chemo    MD COLSC FLX W/RMVL OF TUMOR POLYP LESION SNARE TQ N/A 4/3/2018    Procedure: COLONOSCOPY;  Surgeon: Maria Teresa Padilla MD;  Location: [facility];   Service: Gastroenterology   Shani MN EGD TRANSORAL BIOPSY SINGLE/MULTIPLE N/A 4/3/2018    Procedure: ESOPHAGOGASTRODUODENOSCOPY (EGD); Surgeon: Gogo Madera MD;  Location: [facility];   Service: Gastroenterology    RESECTION TONSIL RADICAL       Social History   Social History     Substance and Sexual Activity   Alcohol Use Yes    Comment: Social     Social History     Substance and Sexual Activity   Drug Use No     Social History     Tobacco Use   Smoking Status Never Smoker   Smokeless Tobacco Never Used     Family History:   Family History   Problem Relation Age of Onset    Heart disease Mother     Diverticulosis Mother     Emphysema Father     Cancer Sister         lung and breast, carcinoma of the pancreas    Heart disease Sister     Parkinsonism Brother     Lung cancer Brother         late 42's       Meds/Allergies   Allergies   Allergen Reactions    Duricef [Cefadroxil] GI Intolerance    Iodides     Latex Rash    Sulfa Antibiotics Rash       Current Outpatient Medications:     ALPRAZolam (XANAX) 0 5 mg tablet, Take 1 tablet (0 5 mg total) by mouth 3 (three) times a day as needed for anxiety, Disp: 90 tablet, Rfl: 0    Cholecalciferol (VITAMIN D-3 PO), Take 1 tablet by mouth every evening , Disp: , Rfl:     dexlansoprazole (DEXILANT) 60 MG capsule, One capsule daily, Disp: 30 capsule, Rfl: 5    doxepin (SINEquan) 10 mg capsule, , Disp: , Rfl: 0    fluticasone (FLONASE) 50 mcg/act nasal spray, fluticasone propionate 50 mcg/actuation nasal spray,suspension, Disp: , Rfl:     HYDROcodone-acetaminophen (XODOL) 7 5-300 MG per tablet, One tab every 8 hours as needed, Disp: 40 tablet, Rfl: 0    levothyroxine 25 mcg tablet, Take 1 tablet (25 mcg total) by mouth daily, Disp: 90 tablet, Rfl: 3    losartan (COZAAR) 25 mg tablet, Take 1 tablet (25 mg total) by mouth daily, Disp: 30 tablet, Rfl: 5    losartan (COZAAR) 50 mg tablet, TAKE 1 TABLET (50 MG TOTAL) BY MOUTH DAILY, Disp: 90 tablet, Rfl: 3    olopatadine HCl (PATADAY) 0 2 % opth drops, , Disp: , Rfl: 1    ondansetron (ZOFRAN) 4 mg tablet, Take 1 tablet (4 mg total) by mouth every 8 (eight) hours as needed for nausea or vomiting, Disp: 20 tablet, Rfl: 1    senna (SENOKOT) 8 6 MG tablet, Take 1 tablet by mouth daily, Disp: , Rfl:     terconazole (TERAZOL 3) 0 8 % vaginal cream, , Disp: , Rfl: 0    venlafaxine (EFFEXOR-XR) 150 mg 24 hr capsule, Take 1 capsule (150 mg total) by mouth daily, Disp: 30 capsule, Rfl: 5    XIFAXAN 550 MG tablet, , Disp: , Rfl: 2    diclofenac sodium (VOLTAREN) 1 %, Apply 2 g topically 4 (four) times a day (Patient not taking: Reported on 6/19/2019), Disp: 1 Tube, Rfl: 5    divalproex sodium (DEPAKOTE) 125 mg EC tablet, Take 125 mg by mouth daily at bedtime  , Disp: , Rfl:     levocetirizine (XYZAL) 5 MG tablet, Take 1 tablet (5 mg total) by mouth every evening (Patient not taking: Reported on 6/19/2019), Disp: 30 tablet, Rfl: 5     REVIEW OF SYSTEMS  Constitutional:  Denies fever or chills   Eyes:  Denies change in visual acuity   HENT:  Denies nasal congestion or sore throat   Respiratory:  Denies cough or shortness of breath   Cardiovascular:  Denies chest pain    GI:   abdominal pain, everywhere heard no nausea, vomiting, bloody stools or diarrhea chronic constipation patient is on Senokot and medications for inner table bowel by Gastroenterology  :  Denies dysuria, frequency, difficulty in micturition and nocturia  Musculoskeletal:  Generalized back pain and joint pain generalized arthralgia and myalgia  Neurologic:  Denies headache, focal weakness or sensory changes   Endocrine:  Denies polyuria or polydipsia   Lymphatic:  Denies swollen glands   Psychiatric:  Multiple medication for depression or anxiety     Objective   Current Vitals:   /78   Pulse 61   Temp 98 2 °F (36 8 °C)   Ht 5' (1 524 m)   Wt 70 8 kg (156 lb)   BMI 30 47 kg/m²   Body mass index is 30 47 kg/m²       PHYSICAL EXAMS  General:  Patient is not in acute distress, difficult to move around she is a wheelchair bound  HEENT:  Both pupils normal-size atraumatic, normocephalic, nonicteric  Neck:  JVP not raised  Trachea central  Respiratory:  normal Breath sounds clear to auscultation,  Cardiovascular:  S1-S2 normal without any murmur   GI:  Abdomen soft tender  To touch everywhere multiple scar right subcostal midline from xiphisternum to the pubic tubercle right lumbar left lumbar left groin suprapubic scar  no obvious hernia  No rigidity no guarding bowel sounds present nonspecific pain everywhere to touch  Musculoskeletal:  History of back pain  Integument:  No skin rashes or ulceration  Lymphatic:  No cervical or inguinal lymphadenopathy  Neurologic:  Patient is awake alert, responding to command, well-oriented to time and place and person moving all extremities ambulating without assistance and on wheelchair  Bilateral lower extremity has marked chronic lymphedema and varicosities    Visit Diagnosis:   Diagnoses and all orders for this visit:    Inflammatory bowel disease    Iron deficiency anemia secondary to inadequate dietary iron intake    Moderate episode of recurrent major depressive disorder (HCC)    Anxiety    Generalized abdominal pain    Fibromyalgia    Chronic idiopathic constipation       Plan of care was discussed with family And patient in detail    Pertinent labs reviewed  Pertinent images and available reads personally reviewed  March 20, 2018 small-bowel follow-through  Subtotal colectomy  Otherwise unremarkable study  September 30, 2018 CT scan  No acute CT abnormality in the abdomen or pelvis to account for the patient's symptoms  No significant interval change when compared to May 15, 2017  No suspicious hernia or bowel obstruction    Pertinent notes reviewed    Assessment/Plan   Assessment:  Nonspecific abdominal pain  Chronic constipation  Anxiety disorder/depression  Irritable bowel  CT scan done in 2018 no suspicious hernia or bowel obstruction    Plan:  Patient was offered a repeat CT scan which he refused a she has multiple CT scan with no improvement  Patient was offered for bowel regimes with MiraLax every week to keep a bowel cleansing  Patient was offered to refer to colorectal surgeon for chronic constipation  Patient was given a dietary advised for vegetarian high-fiber diet  Patient was advised follow-up which she well schedule as needed  Patient was advised at present no indication for for surgery  Advised the supportive symptomatic treatment by medical team      MD Tonya Dominique    Office   Tel  [phone]  Fax   [phone]

## 2019-07-03 RX ORDER — HYDROCODONE BITARTRATE AND ACETAMINOPHEN 7.5; 3 MG/1; MG/1
TABLET ORAL
Qty: 40 TABLET | Refills: 0 | Status: SHIPPED | OUTPATIENT
Start: 2019-07-03 | End: 2019-07-25 | Stop reason: SDUPTHER

## 2019-07-03 RX ORDER — ALPRAZOLAM 0.5 MG/1
0.5 TABLET ORAL 3 TIMES DAILY PRN
Qty: 90 TABLET | Refills: 0 | Status: SHIPPED | OUTPATIENT
Start: 2019-07-03 | End: 2019-07-31 | Stop reason: SDUPTHER

## 2019-07-17 DIAGNOSIS — I10 ESSENTIAL HYPERTENSION: ICD-10-CM

## 2019-07-17 RX ORDER — LOSARTAN POTASSIUM 25 MG/1
TABLET ORAL
Qty: 30 TABLET | Refills: 5 | Status: SHIPPED | OUTPATIENT
Start: 2019-07-17 | End: 2019-08-12 | Stop reason: SDUPTHER

## 2019-07-25 DIAGNOSIS — M79.7 FIBROMYALGIA: ICD-10-CM

## 2019-07-26 RX ORDER — HYDROCODONE BITARTRATE AND ACETAMINOPHEN 7.5; 3 MG/1; MG/1
TABLET ORAL
Qty: 40 TABLET | Refills: 0 | Status: SHIPPED | OUTPATIENT
Start: 2019-07-26 | End: 2019-08-12 | Stop reason: SDUPTHER

## 2019-07-29 ENCOUNTER — TELEPHONE (OUTPATIENT)
Dept: FAMILY MEDICINE CLINIC | Facility: CLINIC | Age: 79
End: 2019-07-29

## 2019-07-29 ENCOUNTER — APPOINTMENT (OUTPATIENT)
Dept: LAB | Facility: CLINIC | Age: 79
End: 2019-07-29
Payer: MEDICARE

## 2019-07-29 DIAGNOSIS — R53.83 FATIGUE, UNSPECIFIED TYPE: Primary | ICD-10-CM

## 2019-07-29 DIAGNOSIS — R53.83 FATIGUE, UNSPECIFIED TYPE: ICD-10-CM

## 2019-07-29 DIAGNOSIS — E55.9 VITAMIN D DEFICIENCY: ICD-10-CM

## 2019-07-29 LAB
25(OH)D3 SERPL-MCNC: 30.8 NG/ML (ref 30–100)
ALBUMIN SERPL BCP-MCNC: 4.1 G/DL (ref 3.5–5)
ALP SERPL-CCNC: 71 U/L (ref 46–116)
ALT SERPL W P-5'-P-CCNC: 18 U/L (ref 12–78)
ANION GAP SERPL CALCULATED.3IONS-SCNC: 4 MMOL/L (ref 4–13)
AST SERPL W P-5'-P-CCNC: 22 U/L (ref 5–45)
BILIRUB SERPL-MCNC: 0.73 MG/DL (ref 0.2–1)
BUN SERPL-MCNC: 12 MG/DL (ref 5–25)
CALCIUM SERPL-MCNC: 9.8 MG/DL (ref 8.3–10.1)
CHLORIDE SERPL-SCNC: 107 MMOL/L (ref 100–108)
CHOLEST SERPL-MCNC: 221 MG/DL (ref 50–200)
CO2 SERPL-SCNC: 26 MMOL/L (ref 21–32)
CREAT SERPL-MCNC: 0.83 MG/DL (ref 0.6–1.3)
GFR SERPL CREATININE-BSD FRML MDRD: 67 ML/MIN/1.73SQ M
GLUCOSE P FAST SERPL-MCNC: 99 MG/DL (ref 65–99)
HDLC SERPL-MCNC: 45 MG/DL (ref 40–60)
LDLC SERPL CALC-MCNC: 135 MG/DL (ref 0–100)
NONHDLC SERPL-MCNC: 176 MG/DL
POTASSIUM SERPL-SCNC: 4.2 MMOL/L (ref 3.5–5.3)
PROT SERPL-MCNC: 8.1 G/DL (ref 6.4–8.2)
SODIUM SERPL-SCNC: 137 MMOL/L (ref 136–145)
TRIGL SERPL-MCNC: 205 MG/DL

## 2019-07-29 PROCEDURE — 86618 LYME DISEASE ANTIBODY: CPT

## 2019-07-29 PROCEDURE — 82306 VITAMIN D 25 HYDROXY: CPT

## 2019-07-29 PROCEDURE — 80053 COMPREHEN METABOLIC PANEL: CPT

## 2019-07-29 PROCEDURE — 86617 LYME DISEASE ANTIBODY: CPT

## 2019-07-29 PROCEDURE — 36415 COLL VENOUS BLD VENIPUNCTURE: CPT

## 2019-07-29 PROCEDURE — 80061 LIPID PANEL: CPT

## 2019-07-29 NOTE — TELEPHONE ENCOUNTER
DR Amanda Suarez - PT WOULD LIKE BW ORDERS FOR LIPID PANEL, FULL WORKUP, ELECTROLITES, EVERYTHING  PT WANTS TO GO RIGHT AWAY TO HAVE IT ALL DONE  SHE HASN'T EATEN ANYTHING   SHE SAID YOU ARE GOING AWAY NEXT WEEK AND SHE WANTS TO SEE YOU THIS WEEK  SHE WOULD LIKE CALL BACK  SHE SAID SHE WILL BE  HEADING DOWN TO THE LAB NOW  SHE WILL STOP IN FOR THE ORDER BEFORE SHE GOES FOR THE BW

## 2019-07-29 NOTE — TELEPHONE ENCOUNTER
Other BW recently done  I don't have any appointments this week and I am leaving Thursday  She is a long appointment with at least 30 minutes every time I see her    I have already talked to her today

## 2019-07-31 ENCOUNTER — TELEPHONE (OUTPATIENT)
Dept: FAMILY MEDICINE CLINIC | Facility: CLINIC | Age: 79
End: 2019-07-31

## 2019-07-31 DIAGNOSIS — F41.9 ANXIETY: ICD-10-CM

## 2019-07-31 LAB
B BURGDOR IGG SER IA-ACNC: 2.73
B BURGDOR IGM SER IA-ACNC: 0.65

## 2019-07-31 RX ORDER — ALPRAZOLAM 0.5 MG/1
0.5 TABLET ORAL 3 TIMES DAILY PRN
Qty: 90 TABLET | Refills: 0 | Status: SHIPPED | OUTPATIENT
Start: 2019-07-31 | End: 2019-09-09 | Stop reason: SDUPTHER

## 2019-07-31 NOTE — TELEPHONE ENCOUNTER
Pt calling about lyme test, she said she was borderline and now dr Doc Mariee told her it is negative  Patient would like to know if she can have IV antibiotics ordered for this

## 2019-08-01 LAB
B BURGDOR IGG PATRN SER IB-IMP: POSITIVE
B BURGDOR IGM PATRN SER IB-IMP: NEGATIVE
B BURGDOR18KD IGG SER QL IB: PRESENT
B BURGDOR23KD IGG SER QL IB: PRESENT
B BURGDOR23KD IGM SER QL IB: PRESENT
B BURGDOR28KD IGG SER QL IB: ABNORMAL
B BURGDOR30KD IGG SER QL IB: ABNORMAL
B BURGDOR39KD IGG SER QL IB: PRESENT
B BURGDOR39KD IGM SER QL IB: ABNORMAL
B BURGDOR41KD IGG SER QL IB: PRESENT
B BURGDOR41KD IGM SER QL IB: ABNORMAL
B BURGDOR45KD IGG SER QL IB: ABNORMAL
B BURGDOR58KD IGG SER QL IB: PRESENT
B BURGDOR66KD IGG SER QL IB: ABNORMAL
B BURGDOR93KD IGG SER QL IB: PRESENT

## 2019-08-01 NOTE — TELEPHONE ENCOUNTER
I already discussed the results with the pt earlier today and told her there is no medical reason for antibiotics    Why is she asking now??

## 2019-08-12 DIAGNOSIS — M79.7 FIBROMYALGIA: ICD-10-CM

## 2019-08-12 DIAGNOSIS — I10 ESSENTIAL HYPERTENSION: ICD-10-CM

## 2019-08-12 DIAGNOSIS — E03.9 ACQUIRED HYPOTHYROIDISM: ICD-10-CM

## 2019-08-12 RX ORDER — LOSARTAN POTASSIUM 25 MG/1
25 TABLET ORAL DAILY
Qty: 90 TABLET | Refills: 3 | Status: SHIPPED | OUTPATIENT
Start: 2019-08-12

## 2019-08-12 RX ORDER — LOSARTAN POTASSIUM 50 MG/1
50 TABLET ORAL DAILY
Qty: 90 TABLET | Refills: 3 | Status: SHIPPED | OUTPATIENT
Start: 2019-08-12

## 2019-08-12 RX ORDER — LEVOTHYROXINE SODIUM 0.03 MG/1
25 TABLET ORAL DAILY
Qty: 90 TABLET | Refills: 3 | Status: SHIPPED | OUTPATIENT
Start: 2019-08-12 | End: 2020-06-01 | Stop reason: SDUPTHER

## 2019-08-12 RX ORDER — HYDROCODONE BITARTRATE AND ACETAMINOPHEN 7.5; 3 MG/1; MG/1
TABLET ORAL
Qty: 40 TABLET | Refills: 0 | Status: SHIPPED | OUTPATIENT
Start: 2019-08-12 | End: 2019-09-03 | Stop reason: SDUPTHER

## 2019-08-19 ENCOUNTER — TELEPHONE (OUTPATIENT)
Dept: FAMILY MEDICINE CLINIC | Facility: CLINIC | Age: 79
End: 2019-08-19

## 2019-08-20 ENCOUNTER — OFFICE VISIT (OUTPATIENT)
Dept: FAMILY MEDICINE CLINIC | Facility: CLINIC | Age: 79
End: 2019-08-20
Payer: MEDICARE

## 2019-08-20 VITALS
SYSTOLIC BLOOD PRESSURE: 122 MMHG | WEIGHT: 157 LBS | DIASTOLIC BLOOD PRESSURE: 78 MMHG | OXYGEN SATURATION: 97 % | TEMPERATURE: 97.5 F | HEART RATE: 63 BPM | BODY MASS INDEX: 30.66 KG/M2

## 2019-08-20 DIAGNOSIS — R30.0 DYSURIA: ICD-10-CM

## 2019-08-20 DIAGNOSIS — N39.0 URINARY TRACT INFECTION WITHOUT HEMATURIA, SITE UNSPECIFIED: Primary | ICD-10-CM

## 2019-08-20 LAB
SL AMB  POCT GLUCOSE, UA: ABNORMAL
SL AMB LEUKOCYTE ESTERASE,UA: 500
SL AMB POCT BILIRUBIN,UA: 1
SL AMB POCT BLOOD,UA: ABNORMAL
SL AMB POCT CLARITY,UA: CLEAR
SL AMB POCT COLOR,UA: ABNORMAL
SL AMB POCT KETONES,UA: ABNORMAL
SL AMB POCT NITRITE,UA: ABNORMAL
SL AMB POCT PH,UA: 5
SL AMB POCT SPECIFIC GRAVITY,UA: 1.02
SL AMB POCT URINE PROTEIN: ABNORMAL
SL AMB POCT UROBILINOGEN: ABNORMAL

## 2019-08-20 PROCEDURE — 99213 OFFICE O/P EST LOW 20 MIN: CPT | Performed by: NURSE PRACTITIONER

## 2019-08-20 PROCEDURE — 87086 URINE CULTURE/COLONY COUNT: CPT | Performed by: NURSE PRACTITIONER

## 2019-08-20 PROCEDURE — 81002 URINALYSIS NONAUTO W/O SCOPE: CPT | Performed by: NURSE PRACTITIONER

## 2019-08-20 RX ORDER — CIPROFLOXACIN 500 MG/5ML
250 KIT ORAL 2 TIMES DAILY
Qty: 100 ML | Refills: 0 | Status: SHIPPED | OUTPATIENT
Start: 2019-08-20 | End: 2019-08-23

## 2019-08-20 NOTE — PROGRESS NOTES
Assessment/Plan:  1  Patient requested liquid antibiotic  2  Drink plenty of fluids will feeling ill  3  Follow-up condition changes or worsens        Diagnoses and all orders for this visit:    Urinary tract infection without hematuria, site unspecified  -     ciprofloxacin (CIPRO) 500 mg/5 mL suspension; Take 2 5 mL (250 mg total) by mouth 2 (two) times a day for 3 days    Dysuria  -     POCT urine dip  -     Urine culture          Subjective:      Patient ID: Monserrat Camacho is a 78 y o  female  43-year-old female presents with dysuria for about a day  Denies any blood in the urine  Denies any pelvic pain  Reports she had a lot of frequency and urgency type symptoms  Denies any abdominal pain  Denies medications  Denies fever  The following portions of the patient's history were reviewed and updated as appropriate: allergies and current medications  Review of Systems   Constitutional: Negative  Respiratory: Negative  Cardiovascular: Negative  Genitourinary: Positive for dysuria, frequency and urgency  Objective:      /78 (BP Location: Left arm, Patient Position: Sitting, Cuff Size: Large)   Pulse 63   Temp 97 5 °F (36 4 °C) (Tympanic)   Wt 71 2 kg (157 lb)   SpO2 97%   BMI 30 66 kg/m²          Physical Exam   Constitutional: She appears well-developed and well-nourished  Cardiovascular: Normal rate and regular rhythm  Pulmonary/Chest: Effort normal and breath sounds normal    Abdominal: Soft   Bowel sounds are normal

## 2019-08-21 LAB — BACTERIA UR CULT: NORMAL

## 2019-08-26 ENCOUNTER — TELEPHONE (OUTPATIENT)
Dept: FAMILY MEDICINE CLINIC | Facility: CLINIC | Age: 79
End: 2019-08-26

## 2019-08-26 NOTE — TELEPHONE ENCOUNTER
Asked if she wanted to be seen sooner but she stated she will keep Thursdays appt   But would like to speak with MD tomorrow if you have a moment

## 2019-08-26 NOTE — TELEPHONE ENCOUNTER
PT CALLED,SHE IS NOT FEELING WELL  WAS TREATED FOR A UTI AND THE CIPRO MADE HER SICK  FEELS WEAK,AND NO APPETITE

## 2019-08-29 ENCOUNTER — OFFICE VISIT (OUTPATIENT)
Dept: FAMILY MEDICINE CLINIC | Facility: CLINIC | Age: 79
End: 2019-08-29
Payer: MEDICARE

## 2019-08-29 VITALS
DIASTOLIC BLOOD PRESSURE: 74 MMHG | OXYGEN SATURATION: 98 % | HEART RATE: 69 BPM | TEMPERATURE: 98.7 F | HEIGHT: 60 IN | BODY MASS INDEX: 31.02 KG/M2 | WEIGHT: 158 LBS | SYSTOLIC BLOOD PRESSURE: 132 MMHG

## 2019-08-29 DIAGNOSIS — I10 ESSENTIAL HYPERTENSION: ICD-10-CM

## 2019-08-29 DIAGNOSIS — M79.7 FIBROMYALGIA: Primary | ICD-10-CM

## 2019-08-29 DIAGNOSIS — E55.9 VITAMIN D DEFICIENCY: ICD-10-CM

## 2019-08-29 DIAGNOSIS — E03.9 ACQUIRED HYPOTHYROIDISM: ICD-10-CM

## 2019-08-29 DIAGNOSIS — T14.8XXA MUSCLE STRAIN: ICD-10-CM

## 2019-08-29 DIAGNOSIS — F32.A DEPRESSION, UNSPECIFIED DEPRESSION TYPE: ICD-10-CM

## 2019-08-29 DIAGNOSIS — E78.2 MIXED HYPERLIPIDEMIA: ICD-10-CM

## 2019-08-29 PROCEDURE — 99214 OFFICE O/P EST MOD 30 MIN: CPT | Performed by: FAMILY MEDICINE

## 2019-08-29 NOTE — PROGRESS NOTES
Assessment/Plan:    No problem-specific Assessment & Plan notes found for this encounter  Diagnoses and all orders for this visit:    Vitamin D deficiency  -     Vitamin D 25 hydroxy; Future    Mixed hyperlipidemia  -     Lipid panel; Future        Subjective:      Patient ID: Maria Guadalupe Ramos is a 78 y o  female  This is a f/u appt for this 79 yo female who states she is feeling "ill and fatigued"  The pt was recently treated with Cipro for suspected UTI which turned out to be culture negative  She stopped the Cipro because it made her sick  The pt has Fibromyalgia and depression as her present diagnosis  She isn't due for any BW until October  She does describe some "side" pain but she was lifting water cans to water her plants and may have strained rib cage muscles  The following portions of the patient's history were reviewed and updated as appropriate: allergies, current medications, past family history, past medical history, past social history, past surgical history and problem list     Review of Systems   Constitutional: Positive for fatigue  Respiratory: Negative  Cardiovascular: Negative  Endocrine: Negative  Musculoskeletal: Positive for myalgias  Neurological: Negative  Psychiatric/Behavioral: Positive for sleep disturbance  Objective:      /74   Pulse 69   Temp 98 7 °F (37 1 °C)   Ht 5' (1 524 m)   Wt 71 7 kg (158 lb)   SpO2 98%   BMI 30 86 kg/m²          Physical Exam   Constitutional: She is oriented to person, place, and time  She appears well-developed and well-nourished  Pt is obese with BMI of 30 86   Cardiovascular: Normal rate, regular rhythm and normal heart sounds  Pulmonary/Chest: Effort normal and breath sounds normal    Musculoskeletal:   Pt walks slowly with a cane   Neurological: She is alert and oriented to person, place, and time  Psychiatric: She has a normal mood and affect   Her behavior is normal  Judgment and thought content normal

## 2019-08-29 NOTE — PATIENT INSTRUCTIONS
Pt's symptoms most likely caused by her Fibromyalgia and depression  I will keep her on the same meds  The pt has a history of hyperlipidemia and Vitamin D def  She was given an order to get a lipid panel and Vit D level in Oct before her next visit  The pt will f/u in October  Pt advised to avoid heavy lifting to minimize muscle strains    Pt will continue her present thyroid meds

## 2019-09-03 DIAGNOSIS — M79.7 FIBROMYALGIA: ICD-10-CM

## 2019-09-03 RX ORDER — HYDROCODONE BITARTRATE AND ACETAMINOPHEN 7.5; 3 MG/1; MG/1
TABLET ORAL
Qty: 40 TABLET | Refills: 0 | Status: SHIPPED | OUTPATIENT
Start: 2019-09-03 | End: 2019-09-20 | Stop reason: SDUPTHER

## 2019-09-09 DIAGNOSIS — F41.9 ANXIETY: ICD-10-CM

## 2019-09-09 RX ORDER — ALPRAZOLAM 0.5 MG/1
0.5 TABLET ORAL 3 TIMES DAILY PRN
Qty: 90 TABLET | Refills: 0 | Status: SHIPPED | OUTPATIENT
Start: 2019-09-09 | End: 2019-10-01 | Stop reason: SDUPTHER

## 2019-09-17 ENCOUNTER — TELEPHONE (OUTPATIENT)
Dept: FAMILY MEDICINE CLINIC | Facility: CLINIC | Age: 79
End: 2019-09-17

## 2019-09-17 NOTE — TELEPHONE ENCOUNTER
DR Burton De La Rosa - PT IS VERY DEPRESSED  SHE SAID THE EFFEXOR ISN'T WORKING VERY WELL  SHE IS IN A LOT OF PAIN  SHE CRIES ALL THE TIME  SHE WOULD LIKE TO SPEAK TO YOU  SHE ALSO NEEDS REFILL FOR HYDROCODONE AND EFFEXOR

## 2019-09-19 DIAGNOSIS — M79.7 FIBROMYALGIA: ICD-10-CM

## 2019-09-20 DIAGNOSIS — M79.7 FIBROMYALGIA: ICD-10-CM

## 2019-09-20 DIAGNOSIS — F32.A DEPRESSION, UNSPECIFIED DEPRESSION TYPE: ICD-10-CM

## 2019-09-20 DIAGNOSIS — F33.1 MODERATE EPISODE OF RECURRENT MAJOR DEPRESSIVE DISORDER (HCC): Primary | ICD-10-CM

## 2019-09-20 RX ORDER — HYDROCODONE BITARTRATE AND ACETAMINOPHEN 7.5; 3 MG/1; MG/1
TABLET ORAL
Qty: 40 TABLET | Refills: 0 | Status: SHIPPED | OUTPATIENT
Start: 2019-09-20 | End: 2019-10-01 | Stop reason: SDUPTHER

## 2019-09-20 RX ORDER — VENLAFAXINE HYDROCHLORIDE 37.5 MG/1
37.5 TABLET, EXTENDED RELEASE ORAL
Qty: 30 TABLET | Refills: 5 | Status: SHIPPED | OUTPATIENT
Start: 2019-09-20 | End: 2020-01-14 | Stop reason: SDUPTHER

## 2019-09-20 RX ORDER — VENLAFAXINE HYDROCHLORIDE 150 MG/1
150 CAPSULE, EXTENDED RELEASE ORAL DAILY
Qty: 30 CAPSULE | Refills: 5 | Status: SHIPPED | OUTPATIENT
Start: 2019-09-20 | End: 2019-12-26

## 2019-09-20 RX ORDER — HYDROCODONE BITARTRATE AND ACETAMINOPHEN 7.5; 3 MG/1; MG/1
TABLET ORAL
Qty: 40 TABLET | Refills: 0 | Status: CANCELLED | OUTPATIENT
Start: 2019-09-20

## 2019-10-01 DIAGNOSIS — F41.9 ANXIETY: ICD-10-CM

## 2019-10-01 DIAGNOSIS — M79.7 FIBROMYALGIA: ICD-10-CM

## 2019-10-02 ENCOUNTER — OFFICE VISIT (OUTPATIENT)
Dept: HEMATOLOGY ONCOLOGY | Facility: MEDICAL CENTER | Age: 79
End: 2019-10-02
Payer: MEDICARE

## 2019-10-02 VITALS
OXYGEN SATURATION: 96 % | SYSTOLIC BLOOD PRESSURE: 144 MMHG | WEIGHT: 158 LBS | HEIGHT: 60 IN | HEART RATE: 84 BPM | RESPIRATION RATE: 18 BRPM | TEMPERATURE: 96.7 F | BODY MASS INDEX: 31.02 KG/M2 | DIASTOLIC BLOOD PRESSURE: 88 MMHG

## 2019-10-02 DIAGNOSIS — Z17.1 MALIGNANT NEOPLASM OF OVERLAPPING SITES OF LEFT BREAST IN FEMALE, ESTROGEN RECEPTOR NEGATIVE (HCC): Primary | ICD-10-CM

## 2019-10-02 DIAGNOSIS — C50.812 MALIGNANT NEOPLASM OF OVERLAPPING SITES OF LEFT BREAST IN FEMALE, ESTROGEN RECEPTOR NEGATIVE (HCC): Primary | ICD-10-CM

## 2019-10-02 PROCEDURE — 99213 OFFICE O/P EST LOW 20 MIN: CPT | Performed by: INTERNAL MEDICINE

## 2019-10-02 RX ORDER — ALPRAZOLAM 0.5 MG/1
0.5 TABLET ORAL 3 TIMES DAILY PRN
Qty: 90 TABLET | Refills: 0 | Status: SHIPPED | OUTPATIENT
Start: 2019-10-09 | End: 2019-10-28 | Stop reason: SDUPTHER

## 2019-10-02 RX ORDER — HYDROCODONE BITARTRATE AND ACETAMINOPHEN 7.5; 3 MG/1; MG/1
TABLET ORAL
Qty: 40 TABLET | Refills: 0 | Status: SHIPPED | OUTPATIENT
Start: 2019-10-04 | End: 2019-10-28 | Stop reason: SDUPTHER

## 2019-10-02 NOTE — PROGRESS NOTES
Denis Perez  1940  [address] [facility] [id number]    DISCUSSION/SUMMARY:    77-year-old female with history of left-sided stage IIA breast cancer status post bilateral mastectomy in March 2010  Mrs Jay Duff has a number of other active medical problems but at this time, there is no evidence of breast cancer progression  The plan is for patient to continue with anterior chest wall exams - mammogram is not indicated  Routine blood work in the absence of any signs or symptoms is not indicated also  Patient is to return in 12 months  Mrs Jay Duff knows to call the office if she has any other oncology questions or concerns  Patient will also follow up with her PCP regarding the recurrent urinary issues  Patient has a pain  in [place]  The reason for the lower extremity itching is not clear  Patient can try Benadryl lotion topically  Carefully review your medication list and verify that the list is accurate and up-to-date  Please call the hematology/oncology office if there are medications missing from the list, medications on the list that you are not currently taking or if there is a dosage or instruction that is different from how you're taking that medication  Patient goals and areas of care:   Breast cancer surveillance  Barriers to care:  Multiple comorbidities  Patient is able to self-care   _____________________________________________________________________________________    Chief Complaint   Patient presents with    Follow-up     Breast cancer on surveillance     History of Present Illness:    77-year-old female with history of stage IIA (pT2 N0 M0 R0 G2 ER/[place] negative, HER-2/bere = 2+/equivocal FISH =1 4 = not amplified) left breast cancer status post bilateral mastectomy in March 2010 (bilateral mastectomy was patient's request) back for followup   No adjuvant treatment was given (patient's choice)  Mrs Jessi Garcia states feeling +/-  Generalized body aches and pain control issues are about the same as before  Patient follows up with a specialist in [place]  Mrs Jessi Garcia also has had issues with recurrent urinary issues, foul-smelling urine  Patient was treated for urinary tract infection in August -  issues got better but have returned  Patient also complains of lower extremity itching  Fatigue is the same as before  Appetite is okay, no GI issues, no GYN issues  Activities are limited, same as before  Review of Systems   Constitutional: Positive for fatigue  HENT: Negative  Eyes: Negative  Respiratory: Negative  Cardiovascular: Negative  Gastrointestinal: Negative  Endocrine: Negative  Genitourinary: Negative  Musculoskeletal: Positive for arthralgias  Negative for joint swelling  Skin:        Itching   Allergic/Immunologic: Negative  Neurological: Negative  Hematological: Negative  Psychiatric/Behavioral: Negative  All other systems reviewed and are negative       Patient Active Problem List   Diagnosis    Iron deficiency anemia secondary to inadequate dietary iron intake    Dyspnea on exertion    Iron deficiency anemia    Moderate episode of recurrent major depressive disorder (HCC)    Acquired hypothyroidism    Anxiety    Generalized abdominal pain    Inflammatory bowel disease    Hypomagnesemia    Fibromyalgia    Chronic pain syndrome    Hernia of abdominal wall    Generalized pain    Vitamin D deficiency    Malignant neoplasm of overlapping sites of left breast in female, estrogen receptor negative (HCC)    Allergic rhinitis due to pollen    Influenza vaccination administered at current visit    Essential hypertension    Muscle strain    Near syncope    Lymphedema of both lower extremities    Nausea    Left lower quadrant pain    Fatigue    Depression    Tick bite    Chronic idiopathic constipation    Urinary tract infection without hematuria    Dysuria     Past Medical History:   Diagnosis Date    Acute hyperglycemia     Anxiety     Arthritis     Vickie-Dawson    Benign polyp of large intestine     Cancer ([facility] [address] [facility]     breast    Cat-scratch disease     Connective tissue disease ([facility] [address] )     DDD (degenerative disc disease), lumbar     Depression     Disease of thyroid gland     hypo    Diverticulitis large intestine     Feared complaint without diagnosis     last assessed: 17    Fibromyalgia     Full dentures     upper and lower    Genital warts     Hemorrhoids     Herniated lumbar intervertebral disc     History of transfusion     s/p diverticultits perf  emergency surgery    Hypertension     Irritable bowel syndrome     Lyme disease 2010    neurological residuals, feels ill daily, exhausted    Lymphedema     Rectocele     last assessed: 12    Shortness of breath     exertional    Skin pruritus     last assessed: 17    Spinal stenosis, lumbar     Urinary frequency     last assessed: 7/21/15    Wears glasses      Past Surgical History:   Procedure Laterality Date    APPENDECTOMY      during bowel surgery    BREAST SURGERY Left     CA in duct, needle bx     SECTION      CHOLECYSTECTOMY      open    COLON SURGERY      emergency perf diverticulitis, colostomy    COLON SURGERY      reversal of colostomy post rupture left side    COLONOSCOPY      3/1/11 Dr Natalia Miranda: flexible sigmoidoscopy-ileosigmoid anastomotic ulcer (active mucositis with suppurative inflammatory exudate treated with Asacol and retention enema  )  08 Dr Talbot for RLQ abdominal pain: diverticulosis, otherwise normal rectum  Pain likely due to adhesions  06 Dr Natalia Miranda for abdominal Pain: sigmoid hyperplastic polyp, f/u 3 years      ESOPHAGOGASTRODUODENOSCOPY N/A 2016    Procedure: ESOPHAGOGASTRODUODENOSCOPY (EGD) and biopsy, Flex Sig;  Surgeon: Queenie Fall MD;  [facility]; Service:     EYE SURGERY Bilateral 2010    cataract with IOL    HERNIA REPAIR      x2 abdominal, x1 incisional    HYSTERECTOMY      age 29 post fall/ trauma/ emergency    LUMBAR EPIDURAL INJECTION      x4 L1-5    MASTECTOMY, RADICAL Bilateral     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, 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, no radiation or chemo    WA COLSC FLX W/RMVL OF TUMOR POLYP LESION SNARE TQ N/A 4/3/2018    Procedure: COLONOSCOPY;  Surgeon: Kenendy Regalado MD;  Location: [facility]; Service: Gastroenterology    [place] EGD TRANSORAL BIOPSY SINGLE/MULTIPLE N/A 4/3/2018    Procedure: ESOPHAGOGASTRODUODENOSCOPY (EGD); Surgeon: Kennedy Regalado MD;  Location: [facility];   Service: Gastroenterology    RESECTION TONSIL RADICAL       Family History   Problem Relation Age of Onset    Heart disease Mother     Diverticulosis Mother     Emphysema Father     Cancer Sister         lung and breast, carcinoma of the pancreas    Heart disease Sister     Parkinsonism Brother     Lung cancer Brother         late 42's     Social History     Socioeconomic History    Marital status: Single     Spouse name: Not on file    Number of children: Not on file    Years of education: Not on file    Highest education level: Not on file   Occupational History    Occupation: Retired   Social Needs    Financial resource strain: Not on file    Food insecurity:     Worry: Not on file     Inability: Not on file   [employer] needs:     Medical: Not on file     Non-medical: Not on file   Tobacco Use    Smoking status: Never Smoker    Smokeless tobacco: Never Used   Substance and Sexual Activity    Alcohol use: Yes     Comment: Social    Drug use: No    Sexual activity: Not on file   Lifestyle    Physical activity:     Days per week: Not on file     Minutes per session: Not on file    Stress: Not on file   Relationships    Social connections:     Talks on phone: Not on file     Gets together: Not on file     Attends [employer]oration[employer] service: Not on file     Active member of club or organization: Not on file     Attends meetings of clubs or organizations: Not on file     Relationship status: Not on file    Intimate partner violence:     Fear of current or ex partner: Not on file     Emotionally abused: Not on file     Physically abused: Not on file     Forced sexual activity: Not on file   Other Topics Concern    Not on file   Social History Narrative    Advance directive on file    [employer] states both  and Single       Current Outpatient Medications:     ALPRAZolam (XANAX) 0 5 mg tablet, Take 1 tablet (0 5 mg total) by mouth 3 (three) times a day as needed for anxiety, Disp: 90 tablet, Rfl: 0    Cholecalciferol (VITAMIN D-3 PO), Take 1 tablet by mouth every evening , Disp: , Rfl:     dexlansoprazole (DEXILANT) 60 MG capsule, One capsule daily, Disp: 30 capsule, Rfl: 5    fluticasone (FLONASE) 50 mcg/act nasal spray, fluticasone propionate 50 mcg/actuation nasal spray,suspension, Disp: , Rfl:     HYDROcodone-acetaminophen (XODOL) 7 5-300 MG per tablet, One tab every 8 hours as needed, Disp: 40 tablet, Rfl: 0    levothyroxine 25 mcg tablet, Take 1 tablet (25 mcg total) by mouth daily, Disp: 90 tablet, Rfl: 3    losartan (COZAAR) 25 mg tablet, Take 1 tablet (25 mg total) by mouth daily, Disp: 90 tablet, Rfl: 3    losartan (COZAAR) 50 mg tablet, Take 1 tablet (50 mg total) by mouth daily, Disp: 90 tablet, Rfl: 3    olopatadine HCl (PATADAY) 0 2 % opth drops, , Disp: , Rfl: 1    ondansetron (ZOFRAN) 4 mg tablet, Take 1 tablet (4 mg total) by mouth every 8 (eight) hours as needed for nausea or vomiting, Disp: 20 tablet, Rfl: 1    senna (SENOKOT) 8 6 MG tablet, Take 1 tablet by mouth daily, Disp: , Rfl:     terconazole (TERAZOL 3) 0 8 % vaginal cream, , Disp: , Rfl: 0    venlafaxine (EFFEXOR-XR) 150 mg 24 hr capsule, Take 1 capsule (150 mg total) by mouth daily, Disp: 30 capsule, Rfl: 5    venlafaxine 37 5 mg 24 hr tablet, Take 1 tablet (37 5 mg total) by mouth daily with breakfast, Disp: 30 tablet, Rfl: 5    XIFAXAN 550 MG tablet, , Disp: , Rfl: 2    diclofenac sodium (VOLTAREN) 1 %, Apply 2 g topically 4 (four) times a day (Patient not taking: Reported on 6/19/2019), Disp: 1 Tube, Rfl: 5    divalproex sodium (DEPAKOTE) 125 mg EC tablet, Take 125 mg by mouth daily at bedtime  , Disp: , Rfl:     levocetirizine (XYZAL) 5 MG tablet, Take 1 tablet (5 mg total) by mouth every evening (Patient not taking: Reported on 8/20/2019), Disp: 30 tablet, Rfl: 5    Allergies   Allergen Reactions    Duricef [Cefadroxil] GI Intolerance    Iodides     Latex Rash    Sulfa Antibiotics Rash       Vitals:    10/02/19 1612   BP: 144/88   Pulse: 84   Resp: 18   Temp: (!) 96 7 °F (35 9 °C)   SpO2: 96%     Physical Exam   Constitutional: She is oriented to person, place, and time  She appears well-developed and well-nourished  Well-nourished female, no respiratory distress   HENT:   Head: Normocephalic and atraumatic  Right Ear: External ear normal    Left Ear: External ear normal    Nose: Nose normal    Mouth/Throat: Oropharynx is clear and moist    Eyes: Pupils are equal, round, and reactive to light  Conjunctivae and EOM are normal    Neck: Normal range of motion  Neck supple  Cardiovascular: Normal rate, regular rhythm, normal heart sounds and intact distal pulses  Pulmonary/Chest: Effort normal and breath sounds normal    Good air entry bilaterally, clear   Abdominal: Soft  Bowel sounds are normal    Soft, nontender, +bowel sounds, no hepatosplenomegaly   Musculoskeletal: Normal range of motion  Neurological: She is alert and oriented to person, place, and time  She has normal reflexes  Skin: Skin is warm  Good color, warm, moist, no petechiae or ecchymoses   Psychiatric: She has a normal mood and affect   Her behavior is normal  Judgment and thought content normal    Bilateral anterior chest wall (female present during exam) both left and right anterior chest wall suture lines well-healed, no nodules or redness, no axillary adenopathy bilaterally  Extremities:   2+ bilateral lower extremity edema (chronic) - slightly less/better in both lower extremities, pulses are decreased, no obvious cords  Lymphatics:  No adenopathy in the neck, supraclavicular region, axilla and groin bilaterally    Performance Status: 0 - Asymptomatic    Labs    07/29/2019 BUN = 12 creatinine = 0 83 calcium = 9 8 LFTs WNL  06/21/2019 WBC = 4 8 hemoglobin = 12 1 hematocrit = 30 1 MCV = 97 platelet = 346  16/73/5703 WBC = 5 3 hemoglobin = 12 6 hematocrit = 39 1 MCV = 95 platelet = 634 neutrophil = 67% BUN = 15 creatinine = 0 81 calcium = 9 7 AST = 20 ALT = 18 alkaline phosphatase = 62 total bilirubin = 0 40    1/25/17 BUN = 19 creatinine = 0 9 calcium = 10 3 iron = 70 LDH = 180 alkaline phosphatase = 66 LFTs WNL WBC = 4 7 hemoglobin = 11 1 hematocrit = 34 platelet = 130 sedimentation rate = 42 = high    Imaging    09/13/2018 CT scan of the abdomen/pelvis    No acute CT abnormality in the abdomen or pelvis to account for the patient's symptoms  No significant interval change when compared to May 15, 2017  No suspicious hernia or bowel obstruction

## 2019-10-10 ENCOUNTER — OFFICE VISIT (OUTPATIENT)
Dept: FAMILY MEDICINE CLINIC | Facility: CLINIC | Age: 79
End: 2019-10-10
Payer: MEDICARE

## 2019-10-10 VITALS
SYSTOLIC BLOOD PRESSURE: 142 MMHG | BODY MASS INDEX: 31.05 KG/M2 | TEMPERATURE: 97.5 F | HEART RATE: 74 BPM | WEIGHT: 159 LBS | OXYGEN SATURATION: 96 % | DIASTOLIC BLOOD PRESSURE: 82 MMHG

## 2019-10-10 DIAGNOSIS — I10 ESSENTIAL HYPERTENSION: ICD-10-CM

## 2019-10-10 DIAGNOSIS — G47.00 INSOMNIA, UNSPECIFIED TYPE: Primary | ICD-10-CM

## 2019-10-10 DIAGNOSIS — Z23 NEED FOR INFLUENZA VACCINATION: ICD-10-CM

## 2019-10-10 DIAGNOSIS — M79.7 FIBROMYALGIA: ICD-10-CM

## 2019-10-10 DIAGNOSIS — R53.83 FATIGUE, UNSPECIFIED TYPE: ICD-10-CM

## 2019-10-10 DIAGNOSIS — J30.1 ALLERGIC RHINITIS DUE TO POLLEN, UNSPECIFIED SEASONALITY: ICD-10-CM

## 2019-10-10 PROCEDURE — G0008 ADMIN INFLUENZA VIRUS VAC: HCPCS

## 2019-10-10 PROCEDURE — 90682 RIV4 VACC RECOMBINANT DNA IM: CPT

## 2019-10-10 PROCEDURE — 99214 OFFICE O/P EST MOD 30 MIN: CPT | Performed by: FAMILY MEDICINE

## 2019-10-14 NOTE — PATIENT INSTRUCTIONS
Pt was given referral for a sleep study possibly which can be done at home  Pt will continue her present meds for hypertension which is controlled   Pt may continue OTC meds for allergic rhinitis    Flu vac given

## 2019-10-14 NOTE — PROGRESS NOTES
Assessment/Plan:    No problem-specific Assessment & Plan notes found for this encounter  Diagnoses and all orders for this visit:    Need for influenza vaccination  -     influenza vaccine, [id number], quadrivalent, recombinant, PF, 0 5 mL, for patients 18 yr+ (FLUBLOK)    Insomnia, unspecified type  -     Ambulatory referral to Pulmonology; Future    Allergic rhinitis due to pollen, unspecified seasonality    Essential hypertension    Fibromyalgia    Fatigue, unspecified type        Subjective:      Patient ID: Torie Lyons is a 78 y o  female  This is a f/u appt for this 79 yo female with past hx of fibromyalgia, depression and many other medical problems  She states problems with chronic insomnia  The pt reports attempting a sleep study over 5 years ago but not able to complete the study because she couldn't sleep during the study  She has chronic fatigue complaints  She was started on a higher dose of effexor just 4 weeks ago      The following portions of the patient's history were reviewed and updated as appropriate: allergies, current medications, past family history, past medical history, past social history, past surgical history and problem list     Review of Systems   Constitutional: Positive for fatigue  Respiratory: Negative  Cardiovascular: Negative  Musculoskeletal: Positive for arthralgias and myalgias  Neurological: Negative  Psychiatric/Behavioral: Positive for sleep disturbance  Objective:      /82 (BP Location: Left arm, Patient Position: Sitting, Cuff Size: Large)   Pulse 74   Temp 97 5 °F (36 4 °C) (Tympanic)   Wt 72 1 kg (159 lb)   SpO2 96%   BMI 31 05 kg/m²          Physical Exam   Constitutional: She is oriented to person, place, and time  She appears well-developed and well-nourished  Pt is obese with BMI of 31 05   Cardiovascular: Normal rate, regular rhythm and normal heart sounds     Pulmonary/Chest: Effort normal and breath sounds normal  Musculoskeletal:   Walks with a cane   Neurological: She is oriented to person, place, and time  Psychiatric: She has a normal mood and affect  Her behavior is normal  Judgment and thought content normal    Vitals reviewed

## 2019-10-28 DIAGNOSIS — F41.9 ANXIETY: ICD-10-CM

## 2019-10-28 DIAGNOSIS — M79.7 FIBROMYALGIA: ICD-10-CM

## 2019-10-29 RX ORDER — ALPRAZOLAM 0.5 MG/1
0.5 TABLET ORAL 3 TIMES DAILY PRN
Qty: 90 TABLET | Refills: 0 | Status: SHIPPED | OUTPATIENT
Start: 2019-11-02 | End: 2019-11-27 | Stop reason: SDUPTHER

## 2019-10-29 RX ORDER — HYDROCODONE BITARTRATE AND ACETAMINOPHEN 7.5; 3 MG/1; MG/1
TABLET ORAL
Qty: 35 TABLET | Refills: 0 | Status: SHIPPED | OUTPATIENT
Start: 2019-10-29 | End: 2019-11-13 | Stop reason: SDUPTHER

## 2019-10-29 NOTE — TELEPHONE ENCOUNTER
Pt said both her bottles say 10/2 which is when they were sent to the pharmacy even though the dates were later    She said she only have a few pain pills left

## 2019-11-06 DIAGNOSIS — K21.9 GASTROESOPHAGEAL REFLUX DISEASE WITHOUT ESOPHAGITIS: ICD-10-CM

## 2019-11-11 RX ORDER — DEXLANSOPRAZOLE 60 MG/1
CAPSULE, DELAYED RELEASE ORAL
Qty: 30 CAPSULE | Refills: 5 | Status: SHIPPED | OUTPATIENT
Start: 2019-11-11 | End: 2020-05-26

## 2019-11-12 ENCOUNTER — TELEPHONE (OUTPATIENT)
Dept: FAMILY MEDICINE CLINIC | Facility: CLINIC | Age: 79
End: 2019-11-12

## 2019-11-12 NOTE — TELEPHONE ENCOUNTER
DR Mike Farrell - PT WOULD LIKE TO SPEAK TO YOU ABOUT WHY YOU SHORTED HER 10 HYDROCODONE  SHE SAID SHE USED TO GET 40 AND SHE ONLY HAD 30 THIS TIME  SHE SAID SHE ONLY HAS 7 LEFT AND WOULD LIKE REFILL ALSO  SHE WOULD LIKE CALL BACK TODAY

## 2019-11-13 DIAGNOSIS — M79.7 FIBROMYALGIA: ICD-10-CM

## 2019-11-13 RX ORDER — HYDROCODONE BITARTRATE AND ACETAMINOPHEN 7.5; 3 MG/1; MG/1
TABLET ORAL
Qty: 30 TABLET | Refills: 0 | Status: SHIPPED | OUTPATIENT
Start: 2019-11-13 | End: 2019-11-27 | Stop reason: SDUPTHER

## 2019-11-18 ENCOUNTER — CONSULT (OUTPATIENT)
Dept: PULMONOLOGY | Facility: MEDICAL CENTER | Age: 79
End: 2019-11-18
Payer: MEDICARE

## 2019-11-18 VITALS
SYSTOLIC BLOOD PRESSURE: 118 MMHG | DIASTOLIC BLOOD PRESSURE: 76 MMHG | TEMPERATURE: 97.8 F | RESPIRATION RATE: 12 BRPM | BODY MASS INDEX: 30.43 KG/M2 | HEART RATE: 60 BPM | OXYGEN SATURATION: 98 % | HEIGHT: 60 IN | WEIGHT: 155 LBS

## 2019-11-18 DIAGNOSIS — G47.19 DAYTIME HYPERSOMNOLENCE: Primary | ICD-10-CM

## 2019-11-18 PROBLEM — Z23 NEED FOR INFLUENZA VACCINATION: Status: RESOLVED | Noted: 2019-10-10 | Resolved: 2019-11-18

## 2019-11-18 PROCEDURE — 99213 OFFICE O/P EST LOW 20 MIN: CPT | Performed by: NURSE PRACTITIONER

## 2019-11-18 NOTE — ASSESSMENT & PLAN NOTE
Patient has daytime hypersomnolence  ESS is nine and she has a crowded oral airway  I did briefly review the anatomy the upper airway, diagnosis and treatment of MALLORIE  She is had an attempted diagnostic study in the past at the [facility] [address] and defers to go there again  She was not able to sleep and was very uncomfortable  I will order a home sleep study  She does have some claustrophobia and is uncertain whether not she would be able to use CPAP  However, she is willing to find out if she has a diagnosis

## 2019-11-18 NOTE — PROGRESS NOTES
Assessment/Plan:       Problem List Items Addressed This Visit        Other    Daytime hypersomnolence - Primary     Patient has daytime hypersomnolence  ESS is nine and she has a crowded oral airway  I did briefly review the anatomy the upper airway, diagnosis and treatment of MALLORIE  She is had an attempted diagnostic study in the past at the [address] and defers to go there again  She was not able to sleep and was very uncomfortable  I will order a home sleep study  She does have some claustrophobia and is uncertain whether not she would be able to use CPAP  However, she is willing to find out if she has a diagnosis  No follow-ups on file  All questions are answered to the patient's satisfaction and understanding  She verbalizes understanding  She is encouraged to call with any further questions or concerns  Portions of the record may have been created with voice recognition software  Occasional wrong word or "sound a like" substitutions may have occurred due to the inherent limitations of voice recognition software  Read the chart carefully and recognize, using context, where substitutions have occurred  a  HPI:  Libia Richardson is a 66-year-old female who was referred by her primary care doctor  She has fatigue and daytime hypersomnolence  According to patient she had an attempted diagnostic sleep study in the past but was unable  To completed  She believes that she likely snores  She sleeps alone so is not certain if she has witnessed apnea    She sleeps during the day and is here today for re-evaluation of possible obstructive sleep apnea  Electronically Signed by DI Vega    ______________________________________________________________________    Chief Complaint:   Chief Complaint   Patient presents with   [employer] Establish Care     consult Dr Bev Briones Sleeping Problem     only sleeps in increments    Snoring    Fatigue        Patient ID: Libia Richardson is a 78 y o  y o  female has a past medical history of Acute hyperglycemia, Anxiety, Arthritis, Benign polyp of large intestine, Cancer ([facility] [address] ) (2010), Cat-scratch disease, Connective tissue disease ([facility] [address] ), DDD (degenerative disc disease), lumbar, Depression, Disease of thyroid gland, Diverticulitis large intestine, Feared complaint without diagnosis, Fibromyalgia, Full dentures, Genital warts, Hemorrhoids, Herniated lumbar intervertebral disc, History of transfusion, Hypertension, Irritable bowel syndrome, Lyme disease (2010), Lymphedema, Rectocele, Shortness of breath, Skin pruritus, Spinal stenosis, lumbar, Urinary frequency, and Wears glasses  2019  Patient presents today for initial visit  Fatigue   This is a chronic problem  The problem occurs daily  The problem has been gradually worsening  Associated symptoms include fatigue  Nothing aggravates the symptoms  She has tried sleep for the symptoms  The treatment provided mild relief  Occupational/Exposure history:  Pets/Enviroment:  Travel history:  Review of Systems   Constitutional: Positive for fatigue  HENT: Negative  Eyes: Negative  Respiratory: Negative  Cardiovascular: Negative  Gastrointestinal: Negative  Endocrine: Negative  Genitourinary: Negative  Musculoskeletal: Negative  Skin: Negative  Allergic/Immunologic: Negative  Neurological: Negative  Hematological: Negative  Psychiatric/Behavioral: Negative  Social history: She reports that she has never smoked  She has never used smokeless tobacco  She reports that she drinks alcohol  She reports that she does not use drugs      Past surgical history:   Past Surgical History:   Procedure Laterality Date    APPENDECTOMY      during bowel surgery    BREAST SURGERY Left     CA in duct, needle bx     SECTION      CHOLECYSTECTOMY      open    COLON SURGERY      emergency perf diverticulitis, colostomy    COLON SURGERY      reversal of colostomy post rupture left side    COLONOSCOPY      3/1/11 Dr Nancy Hall: flexible sigmoidoscopy-ileosigmoid anastomotic ulcer (active mucositis with suppurative inflammatory exudate treated with Asacol and retention enema  )  9/30/08 Dr Talbot for RLQ abdominal pain: diverticulosis, otherwise normal rectum  Pain likely due to adhesions  12/5/06 Dr Nancy Hall for abdominal Pain: sigmoid hyperplastic polyp, f/u 3 years   ESOPHAGOGASTRODUODENOSCOPY N/A 9/27/2016    Procedure: ESOPHAGOGASTRODUODENOSCOPY (EGD) and biopsy, Flex Sig;  Surgeon: Mary Fulton MD;  Location: [place] [address] GI LAB; Service:     EYE SURGERY Bilateral 2010    cataract with IOL    HERNIA REPAIR      x2 abdominal, x1 incisional    HYSTERECTOMY      age 29 post fall/ trauma/ emergency    LUMBAR EPIDURAL INJECTION      x4 L1-5    MASTECTOMY, RADICAL Bilateral     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, 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, no radiation or chemo    IN COLSC FLX W/RMVL OF TUMOR POLYP LESION SNARE TQ N/A 4/3/2018    Procedure: COLONOSCOPY;  Surgeon: Cathryn Crum MD;  Location: [place] [address] GI LAB; Service: Gastroenterology    IN EGD TRANSORAL BIOPSY SINGLE/MULTIPLE N/A 4/3/2018    Procedure: ESOPHAGOGASTRODUODENOSCOPY (EGD); Surgeon: Cathryn Crmu MD;  Location: [facility] GI [facility];   Service: Gastroenterology    RESECTION TONSIL RADICAL       Family history:   Family History   Problem Relation Age of Onset    Heart disease Mother     Diverticulosis Mother     Emphysema Father     Cancer Sister         lung and breast, carcinoma of the pancreas    Heart disease Sister     Parkinsonism Brother     Lung cancer Brother         late 42's       Immunization History   Administered Date(s) Administered    Influenza Split High Dose Preservative Free IM 09/22/2016, 09/13/2017    Influenza, high dose seasonal 0 5 mL 11/08/2018    Influenza, recombinant, quadrivalent,injectable, preservative free 10/10/2019    Pneumococcal Conjugate 13-Valent 01/16/2017    Pneumococcal Polysaccharide PPV23 10/15/2015    Tdap 01/16/2017     Current Outpatient Medications   Medication Sig Dispense Refill    ALPRAZolam (XANAX) 0 5 mg tablet Take 1 tablet (0 5 mg total) by mouth 3 (three) times a day as needed for anxiety 90 tablet 0    Cholecalciferol (VITAMIN D-3 PO) Take 1 tablet by mouth every evening   dexlansoprazole (DEXILANT) 60 MG capsule ONE CAPSULE DAILY 30 capsule 5    fluticasone (FLONASE) 50 mcg/act nasal spray fluticasone propionate 50 mcg/actuation nasal spray,suspension      HYDROcodone-acetaminophen (XODOL) 7 5-300 MG per tablet One tab every 8 hours as needed 30 tablet 0    levothyroxine 25 mcg tablet Take 1 tablet (25 mcg total) by mouth daily 90 tablet 3    losartan (COZAAR) 25 mg tablet Take 1 tablet (25 mg total) by mouth daily 90 tablet 3    losartan (COZAAR) 50 mg tablet Take 1 tablet (50 mg total) by mouth daily 90 tablet 3    olopatadine HCl (PATADAY) 0 2 % opth drops   1    ondansetron (ZOFRAN) 4 mg tablet Take 1 tablet (4 mg total) by mouth every 8 (eight) hours as needed for nausea or vomiting 20 tablet 1    senna (SENOKOT) 8 6 MG tablet Take 1 tablet by mouth daily      terconazole (TERAZOL 3) 0 8 % vaginal cream   0    venlafaxine (EFFEXOR-XR) 150 mg 24 hr capsule Take 1 capsule (150 mg total) by mouth daily 30 capsule 5    venlafaxine 37 5 mg 24 hr tablet Take 1 tablet (37 5 mg total) by mouth daily with breakfast 30 tablet 5    XIFAXAN 550 MG tablet   2    diclofenac sodium (VOLTAREN) 1 % Apply 2 g topically 4 (four) times a day (Patient not taking: Reported on 6/19/2019) 1 Tube 5    divalproex sodium (DEPAKOTE) 125 mg EC tablet Take 125 mg by mouth daily at bedtime        levocetirizine (XYZAL) 5 MG tablet Take 1 tablet (5 mg total) by mouth every evening (Patient not taking: Reported on 8/20/2019) 30 tablet 5     No current facility-administered medications for this visit  Allergies: Duricef [cefadroxil]; Iodides;  Latex; and Sulfa antibiotics    Objective:  Vitals:    11/18/19 1357   BP: 118/76   BP Location: Left arm   Patient Position: Sitting   Cuff Size: Standard   Pulse: 60   Resp: 12   Temp: 97 8 °F (36 6 °C)   TempSrc: Tympanic   SpO2: 98%   Weight: 70 3 kg (155 lb)   Height: 5' (1 524 m)   Oxygen Therapy  SpO2: 98 %    Wt Readings from Last 3 Encounters:   11/18/19 70 3 kg (155 lb)   10/10/19 72 1 kg (159 lb)   10/02/19 71 7 kg (158 lb)     Body mass index is 30 27 kg/m²  Physical Exam   Constitutional: She is oriented to person, place, and time  She appears well-developed and well-nourished  HENT:   Head: Normocephalic and atraumatic  Mallampati 4   Eyes: Pupils are equal, round, and reactive to light  EOM are normal    Neck: Normal range of motion  Neck supple  Cardiovascular: Normal rate and regular rhythm  Pulmonary/Chest: Effort normal    Abdominal: Soft  Genitourinary: Vagina normal    Musculoskeletal: She exhibits edema  Neurological: She is alert and oriented to person, place, and time  Skin: Skin is warm and dry  Capillary refill takes less than 2 seconds  Psychiatric: She has a normal mood and affect   Her behavior is normal        Lab Review:   Office Visit on 08/20/2019   Component Date Value    LEUKOCYTE ESTERASE,UA 08/20/2019 500     NITRITE,UA 08/20/2019 neg     SL AMB POCT UROBILINOGEN 08/20/2019 norm     POCT URINE PROTEIN 08/20/2019 neg      PH,UA 08/20/2019 5     BLOOD,UA 08/20/2019 neg     SPECIFIC GRAVITY,UA 08/20/2019 1 020     KETONES,UA 08/20/2019 neg     BILIRUBIN,UA 08/20/2019 1     GLUCOSE, UA 08/20/2019 norm      COLOR,UA 08/20/2019 light yellow     CLARITY,UA 08/20/2019 clear     Urine Culture 08/20/2019 20,000-29,000 cfu/ml     Appointment on 07/29/2019   Component Date Value    Vit D, 25-Hydroxy 07/29/2019 30 8     LYME AB IGG 07/29/2019 2 73*    LYME AB IGM 07/29/2019 0 65     Cholesterol 07/29/2019 221*    Triglycerides 07/29/2019 205*    HDL, Direct 07/29/2019 45  LDL Calculated 07/29/2019 135*    Non-HDL-Chol (CHOL-HDL) 07/29/2019 176     Sodium 07/29/2019 137     Potassium 07/29/2019 4 2     Chloride 07/29/2019 107     CO2 07/29/2019 26     ANION GAP 07/29/2019 4     BUN 07/29/2019 12     Creatinine 07/29/2019 0 83     Glucose, Fasting 07/29/2019 99     Calcium 07/29/2019 9 8     AST 07/29/2019 22     ALT 07/29/2019 18     Alkaline Phosphatase 07/29/2019 71     Total Protein 07/29/2019 8 1     Albumin 07/29/2019 4 1     Total Bilirubin 07/29/2019 0 73     eGFR 07/29/2019 67     Lyme 18 kD IgG 07/29/2019 Present*    Lyme 23 kD IgG 07/29/2019 Present*    Lyme 28 kD IgG 07/29/2019 Absent     Lyme 30 kD IgG 07/29/2019 Absent     Lyme 39 kD IgG 07/29/2019 Present*    Lyme 41 kD IgG 07/29/2019 Present*    Lyme 45 kD IgG 07/29/2019 Absent     Lyme 58 kD IgG 07/29/2019 Present*    Lyme 66 kD IgG 07/29/2019 Absent     Lyme 93 kD IgG 07/29/2019 Present*    Lyme 23 kD IgM 07/29/2019 Present*    Lyme 39 kD IgM 07/29/2019 Absent     Lyme 41 kD IgM 07/29/2019 Absent     Lyme IgG WB Interp  07/29/2019 Positive*    Lyme IgM WB Interp  07/29/2019 Negative    Appointment on 06/21/2019   Component Date Value    Magnesium 06/21/2019 2 2     Vit D, 25-Hydroxy 06/21/2019 19 7*    LYME AB IGG 06/21/2019 2 56*    LYME AB IGM 06/21/2019 0 71     WBC 06/21/2019 4 85     RBC 06/21/2019 3 91     Hemoglobin 06/21/2019 12 1     Hematocrit 06/21/2019 38 1     MCV 06/21/2019 97     MCH 06/21/2019 30 9     MCHC 06/21/2019 31 8     RDW 06/21/2019 12 8     Platelets 67/14/5977 217     MPV 06/21/2019 10 7     TSH 3RD GENERATON 06/21/2019 2 750     Glucose, Fasting 06/21/2019 81     Lyme 18 kD IgG 06/21/2019 Present*    Lyme 23 kD IgG 06/21/2019 Present*    Lyme 28 kD IgG 06/21/2019 Absent     Lyme 30 kD IgG 06/21/2019 Absent     Lyme 39 kD IgG 06/21/2019 Present*    Lyme 41 kD IgG 06/21/2019 Present*    Lyme 45 kD IgG 06/21/2019 Absent     Lyme 58 kD IgG 06/21/2019 Present*    Lyme 66 kD IgG 06/21/2019 Absent     Lyme 93 kD IgG 06/21/2019 Absent     Lyme 23 kD IgM 06/21/2019 Present*    Lyme 39 kD IgM 06/21/2019 Absent     Lyme 41 kD IgM 06/21/2019 Present*    Lyme IgG WB Interp  06/21/2019 Positive*    Lyme IgM WB Interp  06/21/2019 Positive*       Diagnostics:  I have personally reviewed pertinent reports  Office Spirometry Results:     ESS: Total score: 9  No results found

## 2019-11-18 NOTE — PATIENT INSTRUCTIONS
Sleep Apnea   AMBULATORY CARE:   Sleep apnea  is also called obstructive sleep apnea  It is a condition that causes you to stop breathing for 10 seconds or more while you are sleeping  During normal sleep, your throat is kept open by muscles that let air pass through easily  Sleep apnea causes the muscles and tissues around your throat to relax and block air from passing through  Sleep apnea may happen many times while you are asleep  Common symptoms include the following:   · No signs of breathing for 10 seconds or more while you sleep    · Snoring loudly, snorting, gasping or choking while you sleep, and waking up suddenly because of these    · A hard time thinking, remembering things, or focusing on your tasks the following day    · Headache or nausea    · Bedwetting or waking up often during the night to urinate    · Feeling sleepy, slow, and tired during the day  Seek care immediately if:   · You have chest pain or trouble breathing  Contact your healthcare provider if:   · You feel tired or depressed  · You have trouble staying awake during the day  · You have trouble thinking clearly  · You have questions or concerns about your condition or care  Treatment for sleep apnea  includes using a continuous positive airway pressure (CPAP) machine to keep your airway open during sleep  A mask is placed over your nose and mouth, or just your nose  The mask is hooked to the CPAP machine that blows a gentle stream of air into the mask when you breathe  This helps keep your airway open so you can breathe more regularly  Extra oxygen may be given to you through the machine  You may be given a mouth device  It looks like a mouth guard or dental retainer and stops your tongue and mouth tissues from blocking your throat while you sleep  Surgery may be needed to remove extra tissues that block your mouth, throat, or nose  Manage sleep apnea:   · Do not smoke    Nicotine and other chemicals in cigarettes and cigars can cause lung damage  Ask your healthcare provider for information if you currently smoke and need help to quit  E-cigarettes or smokeless tobacco still contain nicotine  Talk to your healthcare provider before you use these products  · Do not drink alcohol or take sedative medicine before you go to sleep  Alcohol and sedatives can relax the muscles and tissues around your throat  This can block the airflow to your lungs  · Maintain a healthy weight  Excess tissue around your throat may restrict your breathing  Ask your healthcare provider for information if you need to lose weight  · Sleep on your side or use pillows designed to prevent sleep apnea  This prevents your tongue or other tissues from blocking your throat  You can also raise the head of your bed  Follow up with your healthcare provider as directed:  Write down your questions so you remember to ask them during your visits  © 2017 [facility] Information is for End User's use only and may not be sold, redistributed or otherwise used for commercial purposes  All illustrations and images included in CareNotes® are the copyrighted property of A D A M [employer] Inc  or Karl Ledesma  The above information is an  only  It is not intended as medical advice for individual conditions or treatments  Talk to your doctor, nurse or pharmacist before following any medical regimen to see if it is safe and effective for you

## 2019-11-27 ENCOUNTER — TELEPHONE (OUTPATIENT)
Dept: FAMILY MEDICINE CLINIC | Facility: CLINIC | Age: 79
End: 2019-11-27

## 2019-11-27 DIAGNOSIS — F41.9 ANXIETY: ICD-10-CM

## 2019-11-27 DIAGNOSIS — M79.7 FIBROMYALGIA: ICD-10-CM

## 2019-11-27 RX ORDER — ALPRAZOLAM 0.5 MG/1
0.5 TABLET ORAL 3 TIMES DAILY PRN
Qty: 90 TABLET | Refills: 0 | Status: SHIPPED | OUTPATIENT
Start: 2019-11-27 | End: 2019-12-23 | Stop reason: SDUPTHER

## 2019-11-27 RX ORDER — HYDROCODONE BITARTRATE AND ACETAMINOPHEN 7.5; 3 MG/1; MG/1
TABLET ORAL
Qty: 30 TABLET | Refills: 0 | Status: SHIPPED | OUTPATIENT
Start: 2019-11-27 | End: 2019-12-13 | Stop reason: SDUPTHER

## 2019-11-27 NOTE — TELEPHONE ENCOUNTER
Pt has frequent urination, drinking a lot of fluids  Excessive sweating as well  Would like a call back

## 2019-12-03 ENCOUNTER — LAB REQUISITION (OUTPATIENT)
Dept: LAB | Facility: HOSPITAL | Age: 79
End: 2019-12-03
Payer: MEDICARE

## 2019-12-03 DIAGNOSIS — R30.0 DYSURIA: ICD-10-CM

## 2019-12-03 DIAGNOSIS — R30.0 DYSURIA: Primary | ICD-10-CM

## 2019-12-03 LAB
SL AMB  POCT GLUCOSE, UA: NORMAL
SL AMB LEUKOCYTE ESTERASE,UA: NEGATIVE
SL AMB POCT BILIRUBIN,UA: NEGATIVE
SL AMB POCT BLOOD,UA: NEGATIVE
SL AMB POCT CLARITY,UA: CLEAR
SL AMB POCT COLOR,UA: YELLOW
SL AMB POCT KETONES,UA: NEGATIVE
SL AMB POCT NITRITE,UA: NEGATIVE
SL AMB POCT PH,UA: 5
SL AMB POCT SPECIFIC GRAVITY,UA: 1.02
SL AMB POCT URINE PROTEIN: NEGATIVE
SL AMB POCT UROBILINOGEN: NORMAL

## 2019-12-03 PROCEDURE — 87086 URINE CULTURE/COLONY COUNT: CPT | Performed by: FAMILY MEDICINE

## 2019-12-03 PROCEDURE — 81002 URINALYSIS NONAUTO W/O SCOPE: CPT

## 2019-12-04 ENCOUNTER — TELEPHONE (OUTPATIENT)
Dept: FAMILY MEDICINE CLINIC | Facility: CLINIC | Age: 79
End: 2019-12-04

## 2019-12-04 LAB — BACTERIA UR CULT: NORMAL

## 2019-12-04 NOTE — TELEPHONE ENCOUNTER
Pt is having anxiety, had "a bad day" because she is antsy about culture/UA result  Can you call her with the result?

## 2019-12-04 NOTE — TELEPHONE ENCOUNTER
Please call pt about negative urine c/s result to relief her stress    She has long standing anxiety problems about her health

## 2019-12-11 ENCOUNTER — TELEPHONE (OUTPATIENT)
Dept: FAMILY MEDICINE CLINIC | Facility: CLINIC | Age: 79
End: 2019-12-11

## 2019-12-11 NOTE — TELEPHONE ENCOUNTER
Pt cancelled tonight's appt because she had a "flare of gastroenteritis" last night and will call back to reschedule  She said everything she eats bothers her and wants to know what kind of diet she should be on with her IBS  She is going to try a bland diet for a little

## 2019-12-13 DIAGNOSIS — M79.7 FIBROMYALGIA: ICD-10-CM

## 2019-12-13 RX ORDER — HYDROCODONE BITARTRATE AND ACETAMINOPHEN 7.5; 3 MG/1; MG/1
TABLET ORAL
Qty: 30 TABLET | Refills: 0 | Status: SHIPPED | OUTPATIENT
Start: 2019-12-13 | End: 2019-12-23 | Stop reason: SDUPTHER

## 2019-12-13 NOTE — TELEPHONE ENCOUNTER
Pt asking if you can change back to 40 tablets at a time  It costs the same either way  She does not plan on taking more

## 2019-12-16 ENCOUNTER — TELEPHONE (OUTPATIENT)
Dept: FAMILY MEDICINE CLINIC | Facility: CLINIC | Age: 79
End: 2019-12-16

## 2019-12-16 NOTE — TELEPHONE ENCOUNTER
DR Yuval BLEVINS IS NOT WORKING THAT WELL  PT IS BACK TO BEING MORE DEPRESSED  SHE WOULD LIKE TO SPEAK TO  YOU

## 2019-12-23 DIAGNOSIS — F41.9 ANXIETY: ICD-10-CM

## 2019-12-23 DIAGNOSIS — M79.7 FIBROMYALGIA: ICD-10-CM

## 2019-12-23 RX ORDER — HYDROCODONE BITARTRATE AND ACETAMINOPHEN 7.5; 3 MG/1; MG/1
TABLET ORAL
Qty: 30 TABLET | Refills: 0 | Status: SHIPPED | OUTPATIENT
Start: 2019-12-23 | End: 2020-01-13 | Stop reason: SDUPTHER

## 2019-12-23 RX ORDER — ALPRAZOLAM 0.5 MG/1
0.5 TABLET ORAL 3 TIMES DAILY PRN
Qty: 90 TABLET | Refills: 0 | Status: SHIPPED | OUTPATIENT
Start: 2019-12-30 | End: 2020-01-27 | Stop reason: SDUPTHER

## 2019-12-26 ENCOUNTER — OFFICE VISIT (OUTPATIENT)
Dept: FAMILY MEDICINE CLINIC | Facility: CLINIC | Age: 79
End: 2019-12-26
Payer: MEDICARE

## 2019-12-26 VITALS
SYSTOLIC BLOOD PRESSURE: 144 MMHG | HEART RATE: 87 BPM | BODY MASS INDEX: 30.43 KG/M2 | OXYGEN SATURATION: 98 % | DIASTOLIC BLOOD PRESSURE: 88 MMHG | RESPIRATION RATE: 18 BRPM | WEIGHT: 155 LBS | HEIGHT: 60 IN

## 2019-12-26 DIAGNOSIS — F32.A DEPRESSION, UNSPECIFIED DEPRESSION TYPE: ICD-10-CM

## 2019-12-26 DIAGNOSIS — M79.7 FIBROMYALGIA: Primary | ICD-10-CM

## 2019-12-26 DIAGNOSIS — H10.13 ALLERGIC CONJUNCTIVITIS OF BOTH EYES: ICD-10-CM

## 2019-12-26 PROCEDURE — 99214 OFFICE O/P EST MOD 30 MIN: CPT | Performed by: FAMILY MEDICINE

## 2019-12-26 RX ORDER — OLOPATADINE HYDROCHLORIDE 2 MG/ML
1 SOLUTION/ DROPS OPHTHALMIC DAILY
Qty: 2.5 ML | Refills: 1 | Status: SHIPPED | OUTPATIENT
Start: 2019-12-26

## 2019-12-27 NOTE — PATIENT INSTRUCTIONS
The patient was prescribed Patanol eyedrops for her allergic conjunctivitis  The patient was also referred to Dr Baltazar Glasgow for evaluation for her ongoing depression  Patient will continue her past medications  She will follow up with me in 2 months for chronic medical problems

## 2019-12-27 NOTE — PROGRESS NOTES
Assessment/Plan:    No problem-specific Assessment & Plan notes found for this encounter  Diagnoses and all orders for this visit:    Fibromyalgia    Allergic conjunctivitis of both eyes  -     olopatadine HCl (PATADAY) 0 2 % opth drops; Administer 1 drop to both eyes daily    Depression, unspecified depression type        Subjective:      Patient ID: Hemant Robledo is a 78 y o  female  This is a follow-up appointment for a 77-year-old patient with chronic medical problems that include fibromyalgia, depression, and a new acute complaint of dry itchy eyes  The patient usually has muscle aches and pains secondary to the fibromyalgia every day  She recently stated that she did not take her antidepressant medicine was working  She also complains of fatigue but has a history of hypothyroidism but recent TSH levels were normal   She denies any other new complaints  The following portions of the patient's history were reviewed and updated as appropriate: allergies, current medications, past family history, past medical history, past social history, past surgical history and problem list     Review of Systems   Constitutional: Positive for fatigue  Negative for fever  Eyes: Positive for discharge, redness, itching and visual disturbance  Respiratory: Negative  Cardiovascular: Negative  Musculoskeletal: Positive for arthralgias and myalgias  Neurological: Negative  Psychiatric/Behavioral: Positive for decreased concentration  Objective:      /88   Pulse 87   Resp 18   Ht 5' (1 524 m)   Wt 70 3 kg (155 lb)   SpO2 98%   BMI 30 27 kg/m²          Physical Exam   Constitutional: She is oriented to person, place, and time  She appears well-developed and well-nourished  The patient is obese with a BMI of 30 27   Eyes: Pupils are equal, round, and reactive to light  EOM are normal    Patient has erythematous conjunctiva bilaterally     Cardiovascular: Normal rate, regular rhythm and normal heart sounds  Pulmonary/Chest: Effort normal and breath sounds normal    Neurological: She is alert and oriented to person, place, and time  Psychiatric: She has a normal mood and affect   Her behavior is normal  Judgment and thought content normal

## 2020-01-02 ENCOUNTER — TELEPHONE (OUTPATIENT)
Dept: FAMILY MEDICINE CLINIC | Facility: CLINIC | Age: 80
End: 2020-01-02

## 2020-01-02 NOTE — TELEPHONE ENCOUNTER
Spoke to pharmacy regarding venlafaxine  They have 2 orders for venlafaxine, 150 mg and 37 5 mg  They were both ordered the same day (sept 2019) with the same amount of refills  Patient is not sure what she should be taking    Please clarify so I can let the patient/pharmacy know

## 2020-01-02 NOTE — TELEPHONE ENCOUNTER
Patient needs her venlafaxine ER clarified   She has 37 5mg and 150mg   Should she be taking both?  Only 37 5 is on list

## 2020-01-03 NOTE — TELEPHONE ENCOUNTER
She can stop the 37 5 dose  She needs to make the appt with Dr Valerio Davila for her depression because I don't know what to try for her depression because I have tried so many meds that didn't work or she had side effects

## 2020-01-03 NOTE — TELEPHONE ENCOUNTER
Telly Galvez called to give us Lizzy العلي [facility]) who had told Telly Galvez that there was a combo of meds, lexapro and something else,(she cant remember) that seemed to work well for depression  Telly Galvez thought maybe you could call her and find out what they are or what she recommends  Her number [phone]  ext [phone]  I did tell her about d/c her 37 5mg and keeping her appt with Dr Denise Ha and then we can see what happens and go from there
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
## 2020-01-03 NOTE — TELEPHONE ENCOUNTER
Spoke to patient, she says she feels awful all the time, fatigue, sweats, stomachache, she is thinking that this started when she started the levothyroxine and was wondering if the brand synthroid would work better  If she is unable to tolerate the generic maybe we can get a PA for the brand  She has an appt with Dr Ada Ramos which she will give it a try but heard not so good information about him from a friend  She really doesn't want to decrease her venlafaxine

## 2020-01-13 DIAGNOSIS — M79.7 FIBROMYALGIA: ICD-10-CM

## 2020-01-13 RX ORDER — HYDROCODONE BITARTRATE AND ACETAMINOPHEN 7.5; 3 MG/1; MG/1
TABLET ORAL
Qty: 30 TABLET | Refills: 0 | Status: CANCELLED | OUTPATIENT
Start: 2020-01-13

## 2020-01-13 RX ORDER — HYDROCODONE BITARTRATE AND ACETAMINOPHEN 7.5; 3 MG/1; MG/1
TABLET ORAL
Qty: 30 TABLET | Refills: 0 | Status: SHIPPED | OUTPATIENT
Start: 2020-01-13 | End: 2020-01-14 | Stop reason: SDUPTHER

## 2020-01-14 DIAGNOSIS — M79.7 FIBROMYALGIA: ICD-10-CM

## 2020-01-14 DIAGNOSIS — F33.1 MODERATE EPISODE OF RECURRENT MAJOR DEPRESSIVE DISORDER (HCC): ICD-10-CM

## 2020-01-14 RX ORDER — HYDROCODONE BITARTRATE AND ACETAMINOPHEN 7.5; 3 MG/1; MG/1
TABLET ORAL
Qty: 30 TABLET | Refills: 0 | Status: SHIPPED | OUTPATIENT
Start: 2020-01-14 | End: 2020-02-10 | Stop reason: SDUPTHER

## 2020-01-14 RX ORDER — VENLAFAXINE HYDROCHLORIDE 37.5 MG/1
37.5 TABLET, EXTENDED RELEASE ORAL
Qty: 30 TABLET | Refills: 5 | Status: SHIPPED | OUTPATIENT
Start: 2020-01-14 | End: 2020-01-24

## 2020-01-23 ENCOUNTER — TELEPHONE (OUTPATIENT)
Dept: FAMILY MEDICINE CLINIC | Facility: CLINIC | Age: 80
End: 2020-01-23

## 2020-01-23 ENCOUNTER — TELEPHONE (OUTPATIENT)
Dept: VASCULAR SURGERY | Facility: CLINIC | Age: 80
End: 2020-01-23

## 2020-01-23 NOTE — TELEPHONE ENCOUNTER
Pt would like you to increase her effexor - she is depressed  She also reports having a 80* F fever a few times  And being under blankets at those times  Feeling very horrible  She does think that her insides feel like they are burning up at times

## 2020-01-23 NOTE — TELEPHONE ENCOUNTER
Scheduling Instructions   F/U per Ratna patient to come back in 1 year 02/08/2020   schedule wasnt open at check out     Spoke with pt  She doesn't wish to follow up at this time

## 2020-01-24 DIAGNOSIS — F33.1 MODERATE EPISODE OF RECURRENT MAJOR DEPRESSIVE DISORDER (HCC): Primary | ICD-10-CM

## 2020-01-24 RX ORDER — VENLAFAXINE HYDROCHLORIDE 75 MG/1
75 TABLET, EXTENDED RELEASE ORAL
Qty: 30 TABLET | Refills: 5 | Status: SHIPPED | OUTPATIENT
Start: 2020-01-24 | End: 2020-02-17

## 2020-01-27 DIAGNOSIS — F41.9 ANXIETY: ICD-10-CM

## 2020-01-27 RX ORDER — ALPRAZOLAM 0.5 MG/1
0.5 TABLET ORAL 3 TIMES DAILY PRN
Qty: 90 TABLET | Refills: 0 | Status: SHIPPED | OUTPATIENT
Start: 2020-01-27 | End: 2020-02-17 | Stop reason: ALTCHOICE

## 2020-01-29 ENCOUNTER — APPOINTMENT (EMERGENCY)
Dept: RADIOLOGY | Facility: HOSPITAL | Age: 80
End: 2020-01-29
Payer: MEDICARE

## 2020-01-29 ENCOUNTER — HOSPITAL ENCOUNTER (EMERGENCY)
Facility: HOSPITAL | Age: 80
Discharge: HOME | End: 2020-01-29
Attending: EMERGENCY MEDICINE | Admitting: EMERGENCY MEDICINE
Payer: MEDICARE

## 2020-01-29 VITALS
HEART RATE: 90 BPM | TEMPERATURE: 96.4 F | RESPIRATION RATE: 18 BRPM | OXYGEN SATURATION: 97 % | DIASTOLIC BLOOD PRESSURE: 93 MMHG | SYSTOLIC BLOOD PRESSURE: 191 MMHG

## 2020-01-29 DIAGNOSIS — R52 GENERALIZED BODY ACHES: Primary | ICD-10-CM

## 2020-01-29 LAB
ALBUMIN SERPL BCP-MCNC: 3.9 G/DL (ref 3.5–5)
ALP SERPL-CCNC: 65 U/L (ref 46–116)
ALT SERPL W P-5'-P-CCNC: 20 U/L (ref 12–78)
ANION GAP SERPL CALCULATED.3IONS-SCNC: 7 MMOL/L (ref 4–13)
APTT PPP: 26 SECONDS (ref 25–32)
AST SERPL W P-5'-P-CCNC: 22 U/L (ref 5–45)
BACTERIA UR QL AUTO: ABNORMAL /HPF
BASOPHILS # BLD AUTO: 0.02 THOUSANDS/ΜL (ref 0–0.1)
BASOPHILS NFR BLD AUTO: 0 % (ref 0–1)
BILIRUB SERPL-MCNC: 0.8 MG/DL (ref 0.2–1)
BILIRUB UR QL STRIP: NEGATIVE
BUN SERPL-MCNC: 13 MG/DL (ref 5–25)
CALCIUM SERPL-MCNC: 10.2 MG/DL (ref 8.3–10.1)
CHLORIDE SERPL-SCNC: 102 MMOL/L (ref 100–108)
CLARITY UR: CLEAR
CO2 SERPL-SCNC: 29 MMOL/L (ref 21–32)
COLOR UR: ABNORMAL
CREAT SERPL-MCNC: 0.85 MG/DL (ref 0.6–1.3)
EOSINOPHIL # BLD AUTO: 0.14 THOUSAND/ΜL (ref 0–0.61)
EOSINOPHIL NFR BLD AUTO: 3 % (ref 0–6)
ERYTHROCYTE [DISTWIDTH] IN BLOOD BY AUTOMATED COUNT: 12.5 % (ref 11.6–15.1)
GFR SERPL CREATININE-BSD FRML MDRD: 65 ML/MIN/1.73SQ M
GLUCOSE SERPL-MCNC: 96 MG/DL (ref 65–140)
GLUCOSE UR STRIP-MCNC: NEGATIVE MG/DL
HCT VFR BLD AUTO: 38.3 % (ref 34.8–46.1)
HGB BLD-MCNC: 12.5 G/DL (ref 11.5–15.4)
HGB UR QL STRIP.AUTO: NEGATIVE
IMM GRANULOCYTES # BLD AUTO: 0.04 THOUSAND/UL (ref 0–0.2)
IMM GRANULOCYTES NFR BLD AUTO: 1 % (ref 0–2)
INR PPP: 0.98 (ref 0.91–1.09)
KETONES UR STRIP-MCNC: NEGATIVE MG/DL
LEUKOCYTE ESTERASE UR QL STRIP: ABNORMAL
LIPASE SERPL-CCNC: 44 U/L (ref 73–393)
LYMPHOCYTES # BLD AUTO: 0.85 THOUSANDS/ΜL (ref 0.6–4.47)
LYMPHOCYTES NFR BLD AUTO: 18 % (ref 14–44)
MAGNESIUM SERPL-MCNC: 1.8 MG/DL (ref 1.6–2.6)
MCH RBC QN AUTO: 32 PG (ref 26.8–34.3)
MCHC RBC AUTO-ENTMCNC: 32.6 G/DL (ref 31.4–37.4)
MCV RBC AUTO: 98 FL (ref 82–98)
MONOCYTES # BLD AUTO: 0.63 THOUSAND/ΜL (ref 0.17–1.22)
MONOCYTES NFR BLD AUTO: 13 % (ref 4–12)
NEUTROPHILS # BLD AUTO: 3.07 THOUSANDS/ΜL (ref 1.85–7.62)
NEUTS SEG NFR BLD AUTO: 65 % (ref 43–75)
NITRITE UR QL STRIP: NEGATIVE
NON-SQ EPI CELLS URNS QL MICRO: ABNORMAL /HPF
NRBC BLD AUTO-RTO: 0 /100 WBCS
NT-PROBNP SERPL-MCNC: 399 PG/ML
PH UR STRIP.AUTO: 6.5 [PH]
PLATELET # BLD AUTO: 196 THOUSANDS/UL (ref 149–390)
PMV BLD AUTO: 10.3 FL (ref 8.9–12.7)
POTASSIUM SERPL-SCNC: 3.6 MMOL/L (ref 3.5–5.3)
PROT SERPL-MCNC: 7.4 G/DL (ref 6.4–8.2)
PROT UR STRIP-MCNC: NEGATIVE MG/DL
PROTHROMBIN TIME: 10.5 SECONDS (ref 9.8–12)
RBC # BLD AUTO: 3.91 MILLION/UL (ref 3.81–5.12)
RBC #/AREA URNS AUTO: ABNORMAL /HPF
SODIUM SERPL-SCNC: 138 MMOL/L (ref 136–145)
SP GR UR STRIP.AUTO: <=1.005 (ref 1–1.03)
TROPONIN I SERPL-MCNC: <0.02 NG/ML
TSH SERPL DL<=0.05 MIU/L-ACNC: 2.79 UIU/ML (ref 0.36–3.74)
UROBILINOGEN UR QL STRIP.AUTO: 0.2 E.U./DL
WBC # BLD AUTO: 4.75 THOUSAND/UL (ref 4.31–10.16)
WBC #/AREA URNS AUTO: ABNORMAL /HPF

## 2020-01-29 PROCEDURE — 80053 COMPREHEN METABOLIC PANEL: CPT | Performed by: PHYSICIAN ASSISTANT

## 2020-01-29 PROCEDURE — 99285 EMERGENCY DEPT VISIT HI MDM: CPT | Performed by: PHYSICIAN ASSISTANT

## 2020-01-29 PROCEDURE — 74177 CT ABD & PELVIS W/CONTRAST: CPT

## 2020-01-29 PROCEDURE — 83880 ASSAY OF NATRIURETIC PEPTIDE: CPT | Performed by: PHYSICIAN ASSISTANT

## 2020-01-29 PROCEDURE — 84484 ASSAY OF TROPONIN QUANT: CPT | Performed by: PHYSICIAN ASSISTANT

## 2020-01-29 PROCEDURE — 83690 ASSAY OF LIPASE: CPT | Performed by: PHYSICIAN ASSISTANT

## 2020-01-29 PROCEDURE — 96361 HYDRATE IV INFUSION ADD-ON: CPT

## 2020-01-29 PROCEDURE — 86617 LYME DISEASE ANTIBODY: CPT | Performed by: PHYSICIAN ASSISTANT

## 2020-01-29 PROCEDURE — 96374 THER/PROPH/DIAG INJ IV PUSH: CPT

## 2020-01-29 PROCEDURE — 99284 EMERGENCY DEPT VISIT MOD MDM: CPT

## 2020-01-29 PROCEDURE — 83735 ASSAY OF MAGNESIUM: CPT | Performed by: PHYSICIAN ASSISTANT

## 2020-01-29 PROCEDURE — 81001 URINALYSIS AUTO W/SCOPE: CPT | Performed by: PHYSICIAN ASSISTANT

## 2020-01-29 PROCEDURE — 84443 ASSAY THYROID STIM HORMONE: CPT | Performed by: PHYSICIAN ASSISTANT

## 2020-01-29 PROCEDURE — 36415 COLL VENOUS BLD VENIPUNCTURE: CPT | Performed by: PHYSICIAN ASSISTANT

## 2020-01-29 PROCEDURE — 73030 X-RAY EXAM OF SHOULDER: CPT

## 2020-01-29 PROCEDURE — 85730 THROMBOPLASTIN TIME PARTIAL: CPT | Performed by: PHYSICIAN ASSISTANT

## 2020-01-29 PROCEDURE — 93005 ELECTROCARDIOGRAM TRACING: CPT

## 2020-01-29 PROCEDURE — 85025 COMPLETE CBC W/AUTO DIFF WBC: CPT | Performed by: PHYSICIAN ASSISTANT

## 2020-01-29 PROCEDURE — 85610 PROTHROMBIN TIME: CPT | Performed by: PHYSICIAN ASSISTANT

## 2020-01-29 PROCEDURE — 86618 LYME DISEASE ANTIBODY: CPT | Performed by: PHYSICIAN ASSISTANT

## 2020-01-29 RX ORDER — MORPHINE SULFATE 4 MG/ML
4 INJECTION, SOLUTION INTRAMUSCULAR; INTRAVENOUS ONCE
Status: COMPLETED | OUTPATIENT
Start: 2020-01-29 | End: 2020-01-29

## 2020-01-29 RX ADMIN — MORPHINE SULFATE 4 MG: 4 INJECTION INTRAVENOUS at 13:45

## 2020-01-29 RX ADMIN — SODIUM CHLORIDE 1000 ML: 0.9 INJECTION, SOLUTION INTRAVENOUS at 12:59

## 2020-01-29 RX ADMIN — IOHEXOL 100 ML: 350 INJECTION, SOLUTION INTRAVENOUS at 13:58

## 2020-01-29 NOTE — ED PROVIDER NOTES
History  Chief Complaint   Patient presents with    Generalized Body Aches     Pt c/o body aches, weakness and dizzy for months  78year old female hx breast cancer, fibromyalgia, lyme disease, HTN presents with generalized weakness x3 months  She states that she has had generalized body aches and pains and weakness for the past several months  She states she bumped into her general surgeon a few days ago who stated maybe she needed IV hydration and she should be seen in the emergency room  She came for further evaluation  She states that her PCP said she may have generalized body and pains chronically from her fibromyalgia  She states she has had Lyme disease in the past and this feels similar to it  She did have 2 ticks on her several months ago which she removed  No fever or chills  No chest pain, shortness of breath, difficulty breathing  No difficulty ambulating  No numbness or tingling  No focal extremity weakness  No change in appetite  No cold symptoms  No headache, dizziness, lightheadedness  No UTI symptoms  Prior to Admission Medications   Prescriptions Last Dose Informant Patient Reported? Taking? ALPRAZolam (XANAX) 0 5 mg tablet   No No   Sig: Take 1 tablet (0 5 mg total) by mouth 3 (three) times a day as needed for anxiety   Cholecalciferol (VITAMIN D-3 PO)  Self Yes No   Sig: Take 1 tablet by mouth every evening     HYDROcodone-acetaminophen (XODOL) 7 5-300 MG per tablet   No No   Sig: One tab every 8 hours as needed   XIFAXAN 550 MG tablet  Self Yes No   dexlansoprazole (DEXILANT) 60 MG capsule   No No   Sig: ONE CAPSULE DAILY   diclofenac sodium (VOLTAREN) 1 %  Self No No   Sig: Apply 2 g topically 4 (four) times a day   Patient not taking: Reported on 6/19/2019   divalproex sodium (DEPAKOTE) 125 mg EC tablet  Self Yes No   Sig: Take 125 mg by mouth daily at bedtime     fluticasone (FLONASE) 50 mcg/act nasal spray  Self Yes No   Sig: fluticasone propionate 50 mcg/actuation nasal spray,suspension   levocetirizine (XYZAL) 5 MG tablet  Self No No   Sig: Take 1 tablet (5 mg total) by mouth every evening   Patient not taking: Reported on 8/20/2019   levothyroxine 25 mcg tablet  Self No No   Sig: Take 1 tablet (25 mcg total) by mouth daily   losartan (COZAAR) 25 mg tablet  Self No No   Sig: Take 1 tablet (25 mg total) by mouth daily   losartan (COZAAR) 50 mg tablet  Self No No   Sig: Take 1 tablet (50 mg total) by mouth daily   olopatadine HCl (PATADAY) 0 2 % opth drops   No No   Sig: Administer 1 drop to both eyes daily   ondansetron (ZOFRAN) 4 mg tablet  Self No No   Sig: Take 1 tablet (4 mg total) by mouth every 8 (eight) hours as needed for nausea or vomiting   senna (SENOKOT) 8 6 MG tablet  Self Yes No   Sig: Take 1 tablet by mouth daily   terconazole (TERAZOL 3) 0 8 % vaginal cream  Self Yes No   venlafaxine 75 mg 24 hr tablet   No No   Sig: Take 1 tablet (75 mg total) by mouth daily with breakfast      Facility-Administered Medications: None       Past Medical History:   Diagnosis Date    Acute hyperglycemia     Anxiety     Arthritis     [place]    Benign polyp of large intestine     Cancer ([facility] [address] ) 2010    breast    Cat-scratch disease     Connective tissue disease ([facility] [address] )     DDD (degenerative disc disease), lumbar     Depression     Disease of thyroid gland     hypo    Diverticulitis large intestine     Feared complaint without diagnosis     last assessed: 1/16/17    Fibromyalgia     Full dentures     upper and lower    Genital warts     Hemorrhoids     Herniated lumbar intervertebral disc     History of transfusion     s/p diverticultits perf  emergency surgery    Hypertension     Irritable bowel syndrome     Lyme disease 2010    neurological residuals, feels ill daily, exhausted    Lymphedema     Rectocele     last assessed: 11/13/12    Shortness of breath     exertional    Skin pruritus     last assessed: 1/7/17    Spinal stenosis, lumbar  Urinary frequency     last assessed: 7/21/15    Wears glasses        Past Surgical History:   Procedure Laterality Date    APPENDECTOMY      during bowel surgery    BREAST SURGERY Left     CA in duct, needle bx     SECTION      CHOLECYSTECTOMY      open    COLON SURGERY      emergency perf diverticulitis, colostomy    COLON SURGERY      reversal of colostomy post rupture left side    COLONOSCOPY      3/1/11 Dr Nancy Hall: flexible sigmoidoscopy-ileosigmoid anastomotic ulcer (active mucositis with suppurative inflammatory exudate treated with Asacol and retention enema  )  08 Dr Talbot for RLQ abdominal pain: diverticulosis, otherwise normal rectum  Pain likely due to adhesions  06 Dr Nancy Hall for abdominal Pain: sigmoid hyperplastic polyp, f/u 3 years   ESOPHAGOGASTRODUODENOSCOPY N/A 2016    Procedure: ESOPHAGOGASTRODUODENOSCOPY (EGD) and biopsy, Flex Sig;  Surgeon: Mary Fulton MD;  Location: [place] [address] GI LAB; Service:     EYE SURGERY Bilateral 2010    cataract with IOL    HERNIA REPAIR      x2 abdominal, x1 incisional    HYSTERECTOMY      age 29 post fall/ trauma/ emergency    LUMBAR EPIDURAL INJECTION      x4 L1-5    MASTECTOMY, RADICAL Bilateral     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, 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, no radiation or chemo    [place] COLSC FLX W/RMVL OF TUMOR POLYP LESION SNARE TQ N/A 4/3/2018    Procedure: COLONOSCOPY;  Surgeon: Cathryn Crum MD;  Location: [place] [address] GI LAB; Service: Gastroenterology    [place] EGD TRANSORAL BIOPSY SINGLE/MULTIPLE N/A 4/3/2018    Procedure: ESOPHAGOGASTRODUODENOSCOPY (EGD); Surgeon: Cathryn Crum MD;  Location: [facility] GI LAB;   Service: Gastroenterology    RESECTION TONSIL RADICAL         Family History   Problem Relation Age of Onset    Heart disease Mother     Diverticulosis Mother     Emphysema Father     Cancer Sister         lung and breast, carcinoma of the pancreas    Heart disease Sister     Parkinsonism Brother     Lung cancer Brother         late 42's     I have reviewed and agree with the history as documented  Social History     Tobacco Use    Smoking status: Never Smoker    Smokeless tobacco: Never Used   Substance Use Topics    Alcohol use: Yes     Comment: Social    Drug use: No        Review of Systems   Constitutional: Negative for chills and fever  HENT: Negative for sneezing and sore throat  Respiratory: Negative for cough and shortness of breath  Cardiovascular: Negative for chest pain, palpitations and leg swelling  Gastrointestinal: Positive for abdominal pain  Negative for constipation, diarrhea, nausea and vomiting  Musculoskeletal: Positive for myalgias  Negative for back pain, gait problem and joint swelling  Skin: Negative for color change, pallor, rash and wound  Neurological: Positive for weakness  Negative for dizziness, syncope, light-headedness, numbness and headaches  All other systems reviewed and are negative  Physical Exam  Physical Exam   Constitutional: She appears well-developed and well-nourished  No distress  HENT:   Head: Normocephalic and atraumatic  Right Ear: Hearing, tympanic membrane, external ear and ear canal normal  Tympanic membrane is not perforated, not erythematous, not retracted and not bulging  Left Ear: Hearing, tympanic membrane, external ear and ear canal normal  Tympanic membrane is not perforated, not erythematous, not retracted and not bulging  Nose: Nose normal    Mouth/Throat: Uvula is midline, oropharynx is clear and moist and mucous membranes are normal  No trismus in the jaw  No uvula swelling  No oropharyngeal exudate, posterior oropharyngeal edema, posterior oropharyngeal erythema or tonsillar abscesses  Eyes: EOM are normal    Neck: Normal range of motion  Cardiovascular: Normal rate, regular rhythm, normal heart sounds and intact distal pulses  Exam reveals no gallop and no friction rub  No murmur heard    Pulmonary/Chest: Effort normal and breath sounds normal  No stridor  No respiratory distress  She has no wheezes  She has no rales  Sp02 is 95% indicating adequate oxygenation on room air   Abdominal: Soft  Bowel sounds are normal  She exhibits no distension and no mass  There is tenderness  There is no guarding  Skin: Skin is warm and dry  Capillary refill takes less than 2 seconds  No rash noted  She is not diaphoretic  No erythema  No pallor  Nursing note and vitals reviewed  Vital Signs  ED Triage Vitals [01/29/20 1158]   Temperature Pulse Respirations Blood Pressure SpO2   97 5 °F (36 4 °C) 92 16 (!) 188/92 95 %      Temp Source Heart Rate Source Patient Position - Orthostatic VS BP Location FiO2 (%)   Tympanic Monitor Lying Left arm --      Pain Score       Worst Possible Pain           Vitals:    01/29/20 1158 01/29/20 1422   BP: (!) 188/92 (!) 191/93   Pulse: 92 90   Patient Position - Orthostatic VS: Lying Lying         Visual Acuity      ED Medications  Medications   sodium chloride 0 9 % bolus 1,000 mL (0 mL Intravenous Stopped 1/29/20 1504)   morphine (PF) 4 mg/mL injection 4 mg (4 mg Intravenous Given 1/29/20 1345)   iohexol (OMNIPAQUE) 350 MG/ML injection (MULTI-DOSE) 100 mL (100 mL Intravenous Given 1/29/20 1358)       Diagnostic Studies  Results Reviewed     Procedure Component Value Units Date/Time    Lyme Antibody Profile with reflex to [facility] [[id number]] Collected:  01/29/20 1503    Lab Status: In process Specimen:  Blood from Arm, Right Updated:  01/29/20 1510    TSH, 3rd generation with Free T4 reflex [[id number]]  (Normal) Collected:  01/29/20 1259    Lab Status:  Final result Specimen:  Blood from Arm, Right Updated:  01/29/20 1333     TSH 3RD GENERATON 2 794 uIU/mL     Narrative:       Patients undergoing fluorescein dye angiography may retain small amounts of fluorescein in the body for 48-72 hours post procedure  Samples containing fluorescein can produce falsely depressed TSH values   If the patient had this procedure,a specimen should be resubmitted post fluorescein clearance        NT-BNP PRO [[id number]]  (Normal) Collected:  01/29/20 1259    Lab Status:  Final result Specimen:  Blood from Arm, Right Updated:  01/29/20 1333     NT-proBNP 399 pg/mL     Lipase [[id number]]  (Abnormal) Collected:  01/29/20 1259    Lab Status:  Final result Specimen:  Blood from Arm, Right Updated:  01/29/20 1333     Lipase 44 u/L     Magnesium [[id number]]  (Normal) Collected:  01/29/20 1259    Lab Status:  Final result Specimen:  Blood from Arm, Right Updated:  01/29/20 1333     Magnesium 1 8 mg/dL     Troponin I [[id number]]  (Normal) Collected:  01/29/20 1259    Lab Status:  Final result Specimen:  Blood from Arm, Right Updated:  01/29/20 1330     Troponin I <0 02 ng/mL     Comprehensive metabolic panel [[id number]]  (Abnormal) Collected:  01/29/20 1259    Lab Status:  Final result Specimen:  Blood from Arm, Right Updated:  01/29/20 1325     Sodium 138 mmol/L      Potassium 3 6 mmol/L      Chloride 102 mmol/L      CO2 29 mmol/L      ANION GAP 7 mmol/L      BUN 13 mg/dL      Creatinine 0 85 mg/dL      Glucose 96 mg/dL      Calcium 10 2 mg/dL      AST 22 U/L      ALT 20 U/L      Alkaline Phosphatase 65 U/L      Total Protein 7 4 g/dL      Albumin 3 9 g/dL      Total Bilirubin 0 80 mg/dL      eGFR 65 ml/min/1 73sq m     Narrative:       [place] guidelines for Chronic Kidney Disease (CKD):     Stage 1 with normal or high GFR (GFR > 90 mL/min/1 73 square meters)    Stage 2 Mild CKD (GFR = 60-89 mL/min/1 73 square meters)    Stage 3A Moderate CKD (GFR = 45-59 mL/min/1 73 square meters)    Stage 3B Moderate CKD (GFR = 30-44 mL/min/1 73 square meters)    Stage 4 Severe CKD (GFR = 15-29 mL/min/1 73 square meters)    Stage 5 End Stage CKD (GFR <15 mL/min/1 73 square meters)  Note: GFR calculation is accurate only with a steady state creatinine    APTT [[id number]]  (Normal) Collected:  01/29/20 1259    Lab Status:  Final result Specimen:  Blood from Arm, Right Updated:  01/29/20 1323     PTT 26 seconds     Protime-INR [[id number]]  (Normal) Collected:  01/29/20 1259    Lab Status:  Final result Specimen:  Blood from Arm, Right Updated:  01/29/20 1323     Protime 10 5 seconds      INR 0 98    Urine Microscopic [[id number]]  (Abnormal) Collected:  01/29/20 1259    Lab Status:  Final result Specimen:  Urine, Clean Catch Updated:  01/29/20 1316     RBC, UA None Seen /hpf      WBC, UA 0-1 /hpf      Epithelial Cells Occasional /hpf      Bacteria, UA None Seen /hpf     UA w Reflex to Microscopic w Reflex to Culture [[id number]]  (Abnormal) Collected:  01/29/20 1259    Lab Status:  Final result Specimen:  Urine, Clean Catch Updated:  01/29/20 1310     Color, UA Light Yellow     Clarity, UA Clear     Specific Gravity, UA <=1 005     pH, UA 6 5     Leukocytes, UA Small     Nitrite, UA Negative     Protein, UA Negative mg/dl      Glucose, UA Negative mg/dl      Ketones, UA Negative mg/dl      Urobilinogen, UA 0 2 E U /dl      Bilirubin, UA Negative     Blood, UA Negative    CBC and differential [[id number]]  (Abnormal) Collected:  01/29/20 1259    Lab Status:  Final result Specimen:  Blood from Arm, Right Updated:  01/29/20 1308     WBC 4 75 Thousand/uL      RBC 3 91 Million/uL      Hemoglobin 12 5 g/dL      Hematocrit 38 3 %      MCV 98 fL      MCH 32 0 pg      MCHC 32 6 g/dL      RDW 12 5 %      MPV 10 3 fL      Platelets 211 Thousands/uL      nRBC 0 /100 WBCs      Neutrophils Relative 65 %      Immat GRANS % 1 %      Lymphocytes Relative 18 %      Monocytes Relative 13 %      Eosinophils Relative 3 %      Basophils Relative 0 %      Neutrophils Absolute 3 07 Thousands/µL      Immature Grans Absolute 0 04 Thousand/uL      Lymphocytes Absolute 0 85 Thousands/µL      Monocytes Absolute 0 63 Thousand/µL      Eosinophils Absolute 0 14 Thousand/µL      Basophils Absolute 0 02 Thousands/µL                  XR shoulder 2+ views LEFT Final Result by Hira Goode MD (01/29 7916)      No acute osseous abnormality  Degenerative changes as described  Workstation performed: [id number]         CT abdomen pelvis with contrast   Final Result by Taqueria Goodman DO (01/29 5572)      Stable CT of the abdomen and pelvis  Postoperative change as described above  Workstation performed: [id number]                    Procedures  ECG 12 Lead Documentation Only  Date/Time: 1/29/2020 12:59 PM  Performed by: Leidy Blanco PA-C  Authorized by: Leidy Blanco PA-C     Indications / Diagnosis:  Generalized weakness  Patient location:  ED  Interpretation:     Interpretation: abnormal    Rate:     ECG rate:  80    ECG rate assessment: normal    Rhythm:     Rhythm: sinus rhythm    Ectopy:     Ectopy: PVCs      PVCs:  Infrequent  QRS:     QRS axis:  Normal    QRS intervals: Wide  Conduction:     Conduction: abnormal      Abnormal conduction: complete RBBB    ST segments:     ST segments:  Normal  T waves:     T waves: normal               ED Course                               MDM  Number of Diagnoses or Management Options  Generalized body aches:   Diagnosis management comments: Patient well appearing, afebrile with chronic generalized body aches and pains with otherwise unremarkable workup  Sent out for lymes testing, will contact for any +results  Discussed potentially treating small leuk in UA however patient was apprehensive starting abx with abdominal discomfort  Advised patient follow up with pcp for re-evaluation of symptoms  Gave patient proper education regarding diagnosis  Answered all questions  Return to ED for any worsening of symptoms otherwise follow up with primary care physician for re-evaluation  Discussed plan with patient who verbalized understanding and agreed to plan         Amount and/or Complexity of Data Reviewed  Clinical lab tests: reviewed and ordered  Tests in the radiology section of CPT®: ordered and reviewed  Tests in the medicine section of CPT®: ordered and reviewed  Discussion of test results with the performing providers: yes  Review and summarize past medical records: yes  Discuss the patient with other providers: yes  Independent visualization of images, tracings, or specimens: yes          Disposition  Final diagnoses:   Generalized body aches     Time reflects when diagnosis was documented in both MDM as applicable and the Disposition within this note     Time User Action Codes Description Comment    1/29/2020  3:34 PM Edinson Arreaga Add [R52] Generalized body aches       ED Disposition     ED Disposition Condition Date/Time Comment    Discharge Stable Wed Jan 29, 2020  3:34 PM Denis Perez discharge to home/self care              Follow-up Information     Follow up With Specialties Details Why Contact Info Additional Information    Latonya Arenas, DO Family Medicine Call today to make follow up appointment for body aches [address]   [address] Emergency Department Emergency Medicine Go to  As needed [address] [place] [address]  [phone] [address], [facility]          Discharge Medication List as of 1/29/2020  3:34 PM      CONTINUE these medications which have NOT CHANGED    Details   ALPRAZolam (XANAX) 0 5 mg tablet Take 1 tablet (0 5 mg total) by mouth 3 (three) times a day as needed for anxiety, Starting Mon 1/27/2020, Normal      Cholecalciferol (VITAMIN D-3 PO) Take 1 tablet by mouth every evening , Historical Med      dexlansoprazole (DEXILANT) 60 MG capsule ONE CAPSULE DAILY, Normal      diclofenac sodium (VOLTAREN) 1 % Apply 2 g topically 4 (four) times a day, Starting Thu 11/8/2018, Normal      divalproex sodium (DEPAKOTE) 125 mg EC tablet Take 125 mg by mouth daily at bedtime  , Starting Tue 5/1/2018, Historical Med      fluticasone (FLONASE) 50 mcg/act nasal spray fluticasone propionate 50 mcg/actuation nasal spray,suspension, Historical Med      HYDROcodone-acetaminophen (XODOL) 7 5-300 MG per tablet One tab every 8 hours as needed, Normal      levocetirizine (XYZAL) 5 MG tablet Take 1 tablet (5 mg total) by mouth every evening, Starting Wed 11/14/2018, Normal      levothyroxine 25 mcg tablet Take 1 tablet (25 mcg total) by mouth daily, Starting Mon 8/12/2019, Normal      !! losartan (COZAAR) 25 mg tablet Take 1 tablet (25 mg total) by mouth daily, Starting Mon 8/12/2019, Normal      !! losartan (COZAAR) 50 mg tablet Take 1 tablet (50 mg total) by mouth daily, Starting Mon 8/12/2019, Normal      olopatadine HCl (PATADAY) 0 2 % opth drops Administer 1 drop to both eyes daily, Starting Thu 12/26/2019, Normal      ondansetron (ZOFRAN) 4 mg tablet Take 1 tablet (4 mg total) by mouth every 8 (eight) hours as needed for nausea or vomiting, Starting Wed 4/3/2019, Normal      senna (SENOKOT) 8 6 MG tablet Take 1 tablet by mouth daily, Historical Med      terconazole (TERAZOL 3) 0 8 % vaginal cream Starting Wed 2/13/2019, Historical Med      venlafaxine 75 mg 24 hr tablet Take 1 tablet (75 mg total) by mouth daily with breakfast, Starting Fri 1/24/2020, Normal      XIFAXAN 550 MG tablet Starting Fri 10/26/2018, Historical Med       !! - Potential duplicate medications found  Please discuss with provider  No discharge procedures on file      ED Provider  Electronically Signed by           Candelaria Hernandez PA-C  01/29/20 6204

## 2020-01-29 NOTE — ED NOTES
Patient transported to [address] [place] [address],[facility], RN  01/29/20 8172

## 2020-01-30 LAB
ATRIAL RATE: 80 BPM
P AXIS: 45 DEGREES
PR INTERVAL: 184 MS
QRS AXIS: 0 DEGREES
QRSD INTERVAL: 168 MS
QT INTERVAL: 446 MS
QTC INTERVAL: 514 MS
T WAVE AXIS: 5 DEGREES
VENTRICULAR RATE: 80 BPM

## 2020-01-30 PROCEDURE — 93010 ELECTROCARDIOGRAM REPORT: CPT | Performed by: INTERNAL MEDICINE

## 2020-02-01 LAB
B BURGDOR IGG PATRN SER IB-IMP: POSITIVE
B BURGDOR IGG+IGM SER-ACNC: 2.06 ISR (ref 0–0.9)
B BURGDOR IGM PATRN SER IB-IMP: NEGATIVE
B BURGDOR18KD IGG SER QL IB: PRESENT
B BURGDOR23KD IGG SER QL IB: PRESENT
B BURGDOR23KD IGM SER QL IB: PRESENT
B BURGDOR28KD IGG SER QL IB: ABNORMAL
B BURGDOR30KD IGG SER QL IB: ABNORMAL
B BURGDOR39KD IGG SER QL IB: PRESENT
B BURGDOR39KD IGM SER QL IB: ABNORMAL
B BURGDOR41KD IGG SER QL IB: PRESENT
B BURGDOR41KD IGM SER QL IB: ABNORMAL
B BURGDOR45KD IGG SER QL IB: ABNORMAL
B BURGDOR58KD IGG SER QL IB: PRESENT
B BURGDOR66KD IGG SER QL IB: ABNORMAL
B BURGDOR93KD IGG SER QL IB: ABNORMAL

## 2020-02-08 DIAGNOSIS — R11.0 NAUSEA: ICD-10-CM

## 2020-02-09 RX ORDER — ONDANSETRON 4 MG/1
4 TABLET, FILM COATED ORAL EVERY 8 HOURS PRN
Qty: 10 TABLET | Refills: 0 | Status: SHIPPED | OUTPATIENT
Start: 2020-02-09 | End: 2020-07-27 | Stop reason: SDUPTHER

## 2020-02-10 DIAGNOSIS — M79.7 FIBROMYALGIA: ICD-10-CM

## 2020-02-10 RX ORDER — HYDROCODONE BITARTRATE AND ACETAMINOPHEN 7.5; 3 MG/1; MG/1
TABLET ORAL
Qty: 30 TABLET | Refills: 0 | Status: SHIPPED | OUTPATIENT
Start: 2020-02-10 | End: 2020-03-02

## 2020-02-17 ENCOUNTER — OFFICE VISIT (OUTPATIENT)
Dept: BEHAVIORAL/MENTAL HEALTH CLINIC | Facility: CLINIC | Age: 80
End: 2020-02-17
Payer: COMMERCIAL

## 2020-02-17 DIAGNOSIS — F41.1 GENERALIZED ANXIETY DISORDER: ICD-10-CM

## 2020-02-17 DIAGNOSIS — F33.1 MODERATE EPISODE OF RECURRENT MAJOR DEPRESSIVE DISORDER (HCC): Primary | ICD-10-CM

## 2020-02-17 PROCEDURE — 1036F TOBACCO NON-USER: CPT | Performed by: PSYCHIATRY & NEUROLOGY

## 2020-02-17 PROCEDURE — 90792 PSYCH DIAG EVAL W/MED SRVCS: CPT | Performed by: PSYCHIATRY & NEUROLOGY

## 2020-02-17 RX ORDER — ESCITALOPRAM OXALATE 10 MG/1
10 TABLET ORAL DAILY
Qty: 30 TABLET | Refills: 1 | Status: SHIPPED | OUTPATIENT
Start: 2020-02-17 | End: 2020-03-30 | Stop reason: SDUPTHER

## 2020-02-17 RX ORDER — VENLAFAXINE 37.5 MG/1
37.5 TABLET ORAL 2 TIMES DAILY
Qty: 30 TABLET | Refills: 1 | Status: SHIPPED | OUTPATIENT
Start: 2020-02-17 | End: 2020-04-27 | Stop reason: SDUPTHER

## 2020-02-17 RX ORDER — ALPRAZOLAM 0.5 MG/1
0.5 TABLET ORAL 2 TIMES DAILY PRN
Qty: 60 TABLET | Refills: 1 | Status: SHIPPED | OUTPATIENT
Start: 2020-02-17 | End: 2020-04-20 | Stop reason: SDUPTHER

## 2020-02-17 RX ORDER — VENLAFAXINE HYDROCHLORIDE 150 MG/1
150 CAPSULE, EXTENDED RELEASE ORAL DAILY
Qty: 30 CAPSULE | Refills: 1 | Status: SHIPPED | OUTPATIENT
Start: 2020-02-17 | End: 2020-03-30 | Stop reason: ALTCHOICE

## 2020-02-17 NOTE — PSYCH
Psychiatric Evaluation - Behavioral Health     Identification Data:Monica Newell Calender 78 y o  female MRN: [id number]    Chief Complaint:  "I have depression and anxiety"  History of present illness:  Patient is a 79 yo female who has been on medications for depression and anxiety for 30 years, following loss of , child  She presently has been on Effexor  5 mg daily although med list indicated only 75 mg daily, which she says was a mistake, not correct dose and did not take that dose  She is also on Xanax 0 5 mg bid  She has recent worsening in depression, feeling down on daily basis, low energy, low interest  Has a number of medical conditions affecting her as well  Sleep is fair, but only 5 or 6 hours  Appetite is good  She denies hopelessness or thoughts of death  She also reports having excessive worrying, feeling internally anxious, tense, edgy, decreased sleep  She denies any h/o manic or psychotic symptoms, denies OCD, PTSD symptoms  Psychiatric Review Of Systems:  Change in sleep: yes  Appetite changes: no  Weight changes: no  Change in energy/anergy: yes  Change in interest/pleasure/anhedonia: yes  Somatic symptoms: yes  Anxiety/panic: yes  Manic symptoms: no  Guilt feelings:no  Hopeless: no  Self injurious behavior/risky behavior: no    Historical Information     Past Psychiatric History:   Has been on Xanax for 30 years, Has been on Effexor for 2 years, previously on Lexapro       (only on 10 mg), briefly on Ambien  She also on Doxepin in past  Has never been hospitalized  Substance Abuse History:  She denies  Past Medical History  HTN, Hypothyroid, Colon resection due to diverticulitis, Breast CA in past, Chronic pain due to fibromyalgia, DJD, DDD  Spondylitis, Spinal Stenosis  Family Psychiatric History:   Multiple family members with alcohol use disorders  Social History:  Developmental: born [place] raised in area     Education: high school diploma/GED  Marital history:  x 2 ,  x 1,  x 1  Living arrangement, social support: son  Occupational History: retired phlebotomist  Access to firearms: none      ALLERGIES   Latex, Sulfa, Duricef  Mental Status Evaluation:  Appearance:  {Adequate hygiene and grooming   Behavior:  calm, cooperative and friendly   Speech:   Language Normal rate and Normal volume  Able to name objects and Able to repeat phrases   Mood:  anxious   Affect: Thought process constricted  Goal directed and coherent   Associations: Tightly connected   Thought Content:  Does not verbalize delusional material   Perceptual Disturbances: Denies hallucinations and does not appear to be responding to internal stimuli   Risk Potential: No suicidal or homicidal ideation   Orientation  Oriented x 3   Memory Short term intact and Long term intact   Attention/Concentration attention span and concentration were age appropriate   Fund of knowledge aware of current events, Aware of past history and vocabulary Average   Insight:  Good insight   Judgment: Good judgment   Gait/Station: normal gait/station   Motor Activity: No abnormal movement noted             Assessment/Plan   Diagnoses and all orders for this visit:    Moderate episode of recurrent major depressive disorder (HCC)  -     escitalopram (LEXAPRO) 10 mg tablet; Take 1 tablet (10 mg total) by mouth daily  -     venlafaxine (EFFEXOR-XR) 150 mg 24 hr capsule; Take 1 capsule (150 mg total) by mouth daily  -     venlafaxine (EFFEXOR) 37 5 mg tablet; Take 1 tablet (37 5 mg total) by mouth 2 (two) times a day    Generalized anxiety disorder  -     escitalopram (LEXAPRO) 10 mg tablet; Take 1 tablet (10 mg total) by mouth daily  -     ALPRAZolam (XANAX) 0 5 mg tablet; Take 1 tablet (0 5 mg total) by mouth 2 (two) times a day as needed for anxiety        Plan: Will add Lexapro 10 mg daily for anxiety and depression    Follow up in 6 weeks  Risks, benefits and possible side effects of Medications:   Risks, benefits, and possible side effects of medications explained to patient and patient verbalizes understanding

## 2020-02-19 ENCOUNTER — OFFICE VISIT (OUTPATIENT)
Dept: FAMILY MEDICINE CLINIC | Facility: CLINIC | Age: 80
End: 2020-02-19
Payer: MEDICARE

## 2020-02-19 VITALS
WEIGHT: 156 LBS | HEART RATE: 88 BPM | BODY MASS INDEX: 30.63 KG/M2 | OXYGEN SATURATION: 99 % | TEMPERATURE: 97.3 F | RESPIRATION RATE: 18 BRPM | SYSTOLIC BLOOD PRESSURE: 130 MMHG | DIASTOLIC BLOOD PRESSURE: 80 MMHG | HEIGHT: 60 IN

## 2020-02-19 DIAGNOSIS — M79.7 FIBROMYALGIA: Primary | ICD-10-CM

## 2020-02-19 DIAGNOSIS — F32.A DEPRESSION, UNSPECIFIED DEPRESSION TYPE: ICD-10-CM

## 2020-02-19 PROCEDURE — 3079F DIAST BP 80-89 MM HG: CPT | Performed by: FAMILY MEDICINE

## 2020-02-19 PROCEDURE — 3008F BODY MASS INDEX DOCD: CPT | Performed by: FAMILY MEDICINE

## 2020-02-19 PROCEDURE — 1160F RVW MEDS BY RX/DR IN RCRD: CPT | Performed by: FAMILY MEDICINE

## 2020-02-19 PROCEDURE — 1036F TOBACCO NON-USER: CPT | Performed by: FAMILY MEDICINE

## 2020-02-19 PROCEDURE — 3075F SYST BP GE 130 - 139MM HG: CPT | Performed by: FAMILY MEDICINE

## 2020-02-19 PROCEDURE — 99214 OFFICE O/P EST MOD 30 MIN: CPT | Performed by: FAMILY MEDICINE

## 2020-02-19 PROCEDURE — 4040F PNEUMOC VAC/ADMIN/RCVD: CPT | Performed by: FAMILY MEDICINE

## 2020-02-20 NOTE — PATIENT INSTRUCTIONS
The pt was told to continue Effexor  mg daily in the AM and given the option of taking Effexor XR 75 mg in the AM or the split dose of 37 5 mg bid that Dr Monica Cruz ordered  Her total dose of Effexor would be the same either way  She does have a f/u appt with Dr Monica Cruz later this month  She was advised to stop the Lexapro 10 mg for now until she sees Dr Monica Cruz because of the nausea    F/u appt in 6 weeks

## 2020-02-20 NOTE — PROGRESS NOTES
Assessment/Plan:    No problem-specific Assessment & Plan notes found for this encounter  Diagnoses and all orders for this visit:    Fibromyalgia    Depression, unspecified depression type        Subjective:      Patient ID: Jessica Siddiqui is a 78 y o  female  This is a f/u appt for a 79 yo female with past hx of fibromyalgia and longstanding depression/anxiety  She was seen in the ER on 1/31 for abdominal pain and fatigue  The pt had a complete w/u including a CT scan of the abd and chest xray and normal CBC, CMP,TSH level and lyme titer  She has also been seeing Dr Madeline Leigh for her anxiety/depression who adjusted some of her medications  She took one Lexapro 10 mg and was nauseous  She has been taking Effexor  mg in the AM   She was also taking Effexor XR 75 mg in the AM but she was given a new rx from Dr Madeline Leigh to split the dose into 37 5 mg in the AM and PM   She has been on multiple regimens of antidepressants in the past, and was referred to Dr Madeline Leigh because her symptoms were unchanged  She denies any new problems  Her abd pain is gone now      The following portions of the patient's history were reviewed and updated as appropriate: allergies, current medications, past family history, past medical history, past social history, past surgical history and problem list     Review of Systems   Constitutional: Positive for fatigue  Respiratory: Negative  Cardiovascular: Negative  Gastrointestinal: Negative  Musculoskeletal: Positive for arthralgias and myalgias  Neurological: Negative  Objective:      /80   Pulse 88   Temp (!) 97 3 °F (36 3 °C)   Resp 18   Ht 5' (1 524 m)   Wt 70 8 kg (156 lb)   SpO2 99%   BMI 30 47 kg/m²          Physical Exam   Constitutional: She is oriented to person, place, and time  She appears well-developed and well-nourished  Cardiovascular: Normal rate, regular rhythm and normal heart sounds     Pulmonary/Chest: Effort normal and breath sounds normal    Musculoskeletal:   Walks with a cane slowly   Neurological: She is alert and oriented to person, place, and time  Psychiatric: She has a normal mood and affect   Her behavior is normal  Judgment and thought content normal

## 2020-02-24 ENCOUNTER — TELEPHONE (OUTPATIENT)
Dept: FAMILY MEDICINE CLINIC | Facility: CLINIC | Age: 80
End: 2020-02-24

## 2020-02-24 NOTE — TELEPHONE ENCOUNTER
Patient states the regimen Dr Ocasio Ask put patient on is not currently working  Patient would like a call back [phone]  Thank you

## 2020-02-28 ENCOUNTER — TELEPHONE (OUTPATIENT)
Dept: OTHER | Facility: OTHER | Age: 80
End: 2020-02-28

## 2020-03-02 DIAGNOSIS — M79.7 FIBROMYALGIA: ICD-10-CM

## 2020-03-02 RX ORDER — HYDROCODONE BITARTRATE AND ACETAMINOPHEN 7.5; 3 MG/1; MG/1
TABLET ORAL
Qty: 30 TABLET | Refills: 0 | Status: SHIPPED | OUTPATIENT
Start: 2020-03-02 | End: 2020-03-17 | Stop reason: SDUPTHER

## 2020-03-02 RX ORDER — HYDROCODONE BITARTRATE AND ACETAMINOPHEN 7.5; 3 MG/1; MG/1
TABLET ORAL
Qty: 30 TABLET | Refills: 0 | Status: CANCELLED | OUTPATIENT
Start: 2020-03-02

## 2020-03-16 DIAGNOSIS — M79.7 FIBROMYALGIA: ICD-10-CM

## 2020-03-16 DIAGNOSIS — F41.1 GENERALIZED ANXIETY DISORDER: ICD-10-CM

## 2020-03-16 DIAGNOSIS — F33.1 MODERATE EPISODE OF RECURRENT MAJOR DEPRESSIVE DISORDER (HCC): ICD-10-CM

## 2020-03-17 DIAGNOSIS — M79.7 FIBROMYALGIA: ICD-10-CM

## 2020-03-17 RX ORDER — ESCITALOPRAM OXALATE 10 MG/1
10 TABLET ORAL DAILY
Qty: 30 TABLET | Refills: 1 | OUTPATIENT
Start: 2020-03-17

## 2020-03-17 RX ORDER — HYDROCODONE BITARTRATE AND ACETAMINOPHEN 7.5; 3 MG/1; MG/1
TABLET ORAL
Qty: 30 TABLET | Refills: 0 | OUTPATIENT
Start: 2020-03-17

## 2020-03-17 RX ORDER — HYDROCODONE BITARTRATE AND ACETAMINOPHEN 7.5; 3 MG/1; MG/1
TABLET ORAL
Qty: 30 TABLET | Refills: 0 | Status: SHIPPED | OUTPATIENT
Start: 2020-03-17 | End: 2020-04-01 | Stop reason: SDUPTHER

## 2020-03-17 RX ORDER — VENLAFAXINE 37.5 MG/1
37.5 TABLET ORAL 2 TIMES DAILY
Qty: 30 TABLET | Refills: 1 | OUTPATIENT
Start: 2020-03-17

## 2020-03-17 RX ORDER — VENLAFAXINE HYDROCHLORIDE 150 MG/1
150 CAPSULE, EXTENDED RELEASE ORAL DAILY
Qty: 30 CAPSULE | Refills: 1 | OUTPATIENT
Start: 2020-03-17

## 2020-03-17 RX ORDER — ALPRAZOLAM 0.5 MG/1
0.5 TABLET ORAL 2 TIMES DAILY PRN
Qty: 60 TABLET | Refills: 1 | OUTPATIENT
Start: 2020-03-17

## 2020-03-20 ENCOUNTER — TELEPHONE (OUTPATIENT)
Dept: FAMILY MEDICINE CLINIC | Facility: CLINIC | Age: 80
End: 2020-03-20

## 2020-03-20 NOTE — TELEPHONE ENCOUNTER
Patient would like a call back as she would like to stop taking her (Effexor and Lexapro)  Patient states it is making her feel sick, she would like to know if she needs to be decrease little by little of if she can just stop taking these mediations

## 2020-03-21 NOTE — TELEPHONE ENCOUNTER
Called patient back and made recommendation to reduce dose of both medications, to 1/2 Lexapro dose and to cut back the Effexor as well

## 2020-03-25 DIAGNOSIS — F41.1 GENERALIZED ANXIETY DISORDER: ICD-10-CM

## 2020-03-26 RX ORDER — ALPRAZOLAM 0.5 MG/1
TABLET ORAL
Qty: 60 TABLET | Refills: 1 | OUTPATIENT
Start: 2020-03-26

## 2020-03-30 ENCOUNTER — TELEMEDICINE (OUTPATIENT)
Dept: BEHAVIORAL/MENTAL HEALTH CLINIC | Facility: CLINIC | Age: 80
End: 2020-03-30
Payer: MEDICARE

## 2020-03-30 DIAGNOSIS — F41.1 GENERALIZED ANXIETY DISORDER: ICD-10-CM

## 2020-03-30 DIAGNOSIS — F33.1 MODERATE EPISODE OF RECURRENT MAJOR DEPRESSIVE DISORDER (HCC): Primary | ICD-10-CM

## 2020-03-30 PROCEDURE — 99214 OFFICE O/P EST MOD 30 MIN: CPT | Performed by: PSYCHIATRY & NEUROLOGY

## 2020-03-30 RX ORDER — ESCITALOPRAM OXALATE 10 MG/1
10 TABLET ORAL DAILY
Qty: 30 TABLET | Refills: 1 | Status: SHIPPED | OUTPATIENT
Start: 2020-03-30 | End: 2020-04-27 | Stop reason: DRUGHIGH

## 2020-03-30 NOTE — PSYCH
Virtual Brief Visit    Problem List Items Addressed This Visit        Other    Moderate episode of recurrent major depressive disorder ([facility] [address] ) - Primary    Relevant Medications    escitalopram (LEXAPRO) 10 mg tablet    Generalized anxiety disorder    Relevant Medications    escitalopram (LEXAPRO) 10 mg tablet                Reason for visit is anxiety and depression    Encounter provider Landon Loyd MD    Provider located at [address]  [address] [address] [phone]      Recent Visits  No visits were found meeting these conditions  Showing recent visits within past 7 days and meeting all other requirements     Future Appointments  No visits were found meeting these conditions  Showing future appointments within next 150 days and meeting all other requirements        After connecting through telephone, the patient was identified by name and date of birth  Ani Sinclair was informed that this is a telemedicine visit and that the visit is being conducted through telephone  My office door was closed  No one else was in the room  She acknowledged consent and understanding of privacy and security of the video platform  The patient has agreed to participate and understands they can discontinue the visit at any time  Patient is aware this is a billable service       Subjective  Ani Sinclair is a 78 y o  female , see below      Past Medical History:   Diagnosis Date    Acute hyperglycemia     Anxiety     Arthritis     [place]ick[place]Daw[place]    Benign polyp of large intestine     Cancer ([facility] [address] ) 2010    breast    Cat-scratch disease     Connective tissue disease ([facility] [address] )     DDD (degenerative disc disease), lumbar     Depression     Disease of thyroid gland     hypo    Diverticulitis large intestine     Feared complaint without diagnosis     last assessed: 1/16/17    Fibromyalgia     Full dentures     upper and lower    Genital warts     Hemorrhoids     Herniated lumbar intervertebral disc     History of transfusion     s/p diverticultits perf  emergency surgery    Hypertension     Irritable bowel syndrome     Lyme disease     neurological residuals, feels ill daily, exhausted    Lymphedema     Rectocele     last assessed: 12    Shortness of breath     exertional    Skin pruritus     last assessed: 17    Spinal stenosis, lumbar     Urinary frequency     last assessed: 7/21/15    Wears glasses        Past Surgical History:   Procedure Laterality Date    APPENDECTOMY      during bowel surgery    BREAST SURGERY Left     CA in duct, needle bx     SECTION      CHOLECYSTECTOMY      open    COLON SURGERY      emergency perf diverticulitis, colostomy    COLON SURGERY      reversal of colostomy post rupture left side    COLONOSCOPY      3/1/11 Dr Livier Foy: flexible sigmoidoscopy-ileosigmoid anastomotic ulcer (active mucositis with suppurative inflammatory exudate treated with Asacol and retention enema  )  08 Dr Talbot for RLQ abdominal pain: diverticulosis, otherwise normal rectum  Pain likely due to adhesions  06 Dr Livier Foy for abdominal Pain: sigmoid hyperplastic polyp, f/u 3 years   ESOPHAGOGASTRODUODENOSCOPY N/A 2016    Procedure: ESOPHAGOGASTRODUODENOSCOPY (EGD) and biopsy, Flex Sig;  Surgeon: Naty Ruiz MD;  Location: [place] [address] GI LAB; Service:     EYE SURGERY Bilateral 2010    cataract with IOL    HERNIA REPAIR      x2 abdominal, x1 incisional    HYSTERECTOMY      age 29 post fall/ trauma/ emergency    LUMBAR EPIDURAL INJECTION      x4 L1-5    MASTECTOMY, RADICAL Bilateral     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, 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, no radiation or chemo    TN COLSC FLX W/RMVL OF TUMOR POLYP LESION SNARE TQ N/A 4/3/2018    Procedure: COLONOSCOPY;  Surgeon: Estelita Preciado MD;  Location: [place] [address] GI LAB;   Service: Gastroenterology    TN EGD TRANSORAL BIOPSY SINGLE/MULTIPLE N/A 4/3/2018    Procedure: ESOPHAGOGASTRODUODENOSCOPY (EGD); Surgeon: Estelita Preciado MD;  Location: [facility]; Service: Gastroenterology    RESECTION TONSIL RADICAL         Current Outpatient Medications   Medication Sig Dispense Refill    ALPRAZolam (XANAX) 0 5 mg tablet Take 1 tablet (0 5 mg total) by mouth 2 (two) times a day as needed for anxiety 60 tablet 1    Cholecalciferol (VITAMIN D-3 PO) Take 1 tablet by mouth every evening        dexlansoprazole (DEXILANT) 60 MG capsule ONE CAPSULE DAILY 30 capsule 5    diclofenac sodium (VOLTAREN) 1 % Apply 2 g topically 4 (four) times a day 1 Tube 5    divalproex sodium (DEPAKOTE) 125 mg EC tablet Take 125 mg by mouth daily at bedtime        escitalopram (LEXAPRO) 10 mg tablet Take 1 tablet (10 mg total) by mouth daily 30 tablet 1    fluticasone (FLONASE) 50 mcg/act nasal spray fluticasone propionate 50 mcg/actuation nasal spray,suspension      HYDROcodone-acetaminophen (XODOL) 7 5-300 MG per tablet One tab every 8 hours as needed 30 tablet 0    levocetirizine (XYZAL) 5 MG tablet Take 1 tablet (5 mg total) by mouth every evening 30 tablet 5    levothyroxine 25 mcg tablet Take 1 tablet (25 mcg total) by mouth daily 90 tablet 3    losartan (COZAAR) 25 mg tablet Take 1 tablet (25 mg total) by mouth daily 90 tablet 3    losartan (COZAAR) 50 mg tablet Take 1 tablet (50 mg total) by mouth daily 90 tablet 3    olopatadine HCl (PATADAY) 0 2 % opth drops Administer 1 drop to both eyes daily 2 5 mL 1    ondansetron (ZOFRAN) 4 mg tablet TAKE 1 TABLET (4 MG TOTAL) BY MOUTH EVERY 8 (EIGHT) HOURS AS NEEDED FOR NAUSEA OR VOMITING 10 tablet 0    senna (SENOKOT) 8 6 MG tablet Take 1 tablet by mouth daily      terconazole (TERAZOL 3) 0 8 % vaginal cream   0    venlafaxine (EFFEXOR) 37 5 mg tablet Take 1 tablet (37 5 mg total) by mouth 2 (two) times a day 30 tablet 1    XIFAXAN 550 MG tablet   2     No current facility-administered medications for this visit  Allergies   Allergen Reactions    Duricef [Cefadroxil] GI Intolerance    Latex Rash    Sulfa Antibiotics Rash       Review of Systems           Patient ID: Annita Gaspar is a 78 y o  female  Subjective:    She is having a telephone visit, and discussed side effects that she is having and says that she may have been present for 1 year-ringing in her ears, nausea, fatigue, muscle tension,since being on Effexor  She is still feeling depressed on a daily basis, she reports her energy is poor and sleeps more in the day or night  She currently is taking Effexor  mg daily, Lexapro 5 mg daily, Xanax 0 5 mg bid  She has been taking hydrocodone only at night  We discussed trying to wean off Effexor and try increasing the Lexapro  She denies any SI/HI, denies A/V asher  Review Of Systems:     Mood Anxious and Depressed   Thought Content Normal   General Sleep Disturbances   Physical symptoms As Noted in HPI       Laboratory Results: No results found for this or any previous visit  Objective:       Mental status:  Appearance calm and cooperative    Mood depressed   Affect affect was blunted   Speech Normal rate and Normal volume   Thought Processes coherent/organized   Hallucinations no hallucinations present    Thought Content no delusional thoughts verbalized   Abnormal Thoughts no suicidal thoughts  and no homicidal thoughts    Orientation A+O x 3   Insight and judgment intact    Assessment/Plan:       Diagnoses and all orders for this visit:    Moderate episode of recurrent major depressive disorder (HCC)  -     escitalopram (LEXAPRO) 10 mg tablet; Take 1 tablet (10 mg total) by mouth daily    Generalized anxiety disorder  -     escitalopram (LEXAPRO) 10 mg tablet; Take 1 tablet (10 mg total) by mouth daily            Treatment Recommendations- Risks Benefits    Taper off Effexor, increasing Lexapro, Doxepin use for sleep    Risks, Benefits And Possible Side Effects Of Medications:  Risks, benefits, and possible side effects of medications explained to patient and patient verbalizes understanding   Discussed use of doxepin which she has for sleep  I spent 15 minutes with the patient during this visit

## 2020-04-01 ENCOUNTER — TELEMEDICINE (OUTPATIENT)
Dept: FAMILY MEDICINE CLINIC | Facility: CLINIC | Age: 80
End: 2020-04-01
Payer: MEDICARE

## 2020-04-01 DIAGNOSIS — M79.7 FIBROMYALGIA: ICD-10-CM

## 2020-04-01 DIAGNOSIS — J30.1 SEASONAL ALLERGIC RHINITIS DUE TO POLLEN: ICD-10-CM

## 2020-04-01 DIAGNOSIS — G89.4 CHRONIC PAIN SYNDROME: ICD-10-CM

## 2020-04-01 DIAGNOSIS — R53.83 FATIGUE, UNSPECIFIED TYPE: ICD-10-CM

## 2020-04-01 DIAGNOSIS — J30.1 SEASONAL ALLERGIC RHINITIS DUE TO POLLEN: Primary | ICD-10-CM

## 2020-04-01 DIAGNOSIS — F32.A DEPRESSION, UNSPECIFIED DEPRESSION TYPE: Primary | ICD-10-CM

## 2020-04-01 PROCEDURE — 99211 OFF/OP EST MAY X REQ PHY/QHP: CPT | Performed by: FAMILY MEDICINE

## 2020-04-01 RX ORDER — HYDROCODONE BITARTRATE AND ACETAMINOPHEN 7.5; 3 MG/1; MG/1
TABLET ORAL
Qty: 30 TABLET | Refills: 0 | Status: SHIPPED | OUTPATIENT
Start: 2020-04-01 | End: 2020-04-20 | Stop reason: SDUPTHER

## 2020-04-01 RX ORDER — FLUTICASONE PROPIONATE 50 MCG
SPRAY, SUSPENSION (ML) NASAL
Qty: 16 G | Refills: 5 | Status: SHIPPED | OUTPATIENT
Start: 2020-04-01

## 2020-04-20 DIAGNOSIS — F41.1 GENERALIZED ANXIETY DISORDER: ICD-10-CM

## 2020-04-20 DIAGNOSIS — M79.7 FIBROMYALGIA: ICD-10-CM

## 2020-04-20 RX ORDER — ALPRAZOLAM 0.5 MG/1
0.5 TABLET ORAL 2 TIMES DAILY PRN
Qty: 60 TABLET | Refills: 1 | Status: SHIPPED | OUTPATIENT
Start: 2020-04-25 | End: 2020-05-26 | Stop reason: SDUPTHER

## 2020-04-20 RX ORDER — HYDROCODONE BITARTRATE AND ACETAMINOPHEN 7.5; 3 MG/1; MG/1
TABLET ORAL
Qty: 30 TABLET | Refills: 0 | Status: SHIPPED | OUTPATIENT
Start: 2020-04-20 | End: 2020-05-04 | Stop reason: SDUPTHER

## 2020-04-24 ENCOUNTER — TELEMEDICINE (OUTPATIENT)
Dept: FAMILY MEDICINE CLINIC | Facility: CLINIC | Age: 80
End: 2020-04-24
Payer: MEDICARE

## 2020-04-24 DIAGNOSIS — F33.1 MODERATE EPISODE OF RECURRENT MAJOR DEPRESSIVE DISORDER (HCC): ICD-10-CM

## 2020-04-24 DIAGNOSIS — J30.1 SEASONAL ALLERGIC RHINITIS DUE TO POLLEN: ICD-10-CM

## 2020-04-24 DIAGNOSIS — E03.9 ACQUIRED HYPOTHYROIDISM: ICD-10-CM

## 2020-04-24 DIAGNOSIS — H10.13 ALLERGIC CONJUNCTIVITIS OF BOTH EYES: ICD-10-CM

## 2020-04-24 DIAGNOSIS — R53.83 FATIGUE, UNSPECIFIED TYPE: ICD-10-CM

## 2020-04-24 DIAGNOSIS — M79.7 FIBROMYALGIA: Primary | ICD-10-CM

## 2020-04-24 DIAGNOSIS — R35.0 URINARY FREQUENCY: ICD-10-CM

## 2020-04-24 PROCEDURE — 99214 OFFICE O/P EST MOD 30 MIN: CPT | Performed by: FAMILY MEDICINE

## 2020-04-27 ENCOUNTER — TELEMEDICINE (OUTPATIENT)
Dept: BEHAVIORAL/MENTAL HEALTH CLINIC | Facility: CLINIC | Age: 80
End: 2020-04-27
Payer: MEDICARE

## 2020-04-27 DIAGNOSIS — F33.1 MODERATE EPISODE OF RECURRENT MAJOR DEPRESSIVE DISORDER (HCC): Primary | ICD-10-CM

## 2020-04-27 DIAGNOSIS — F41.1 GENERALIZED ANXIETY DISORDER: ICD-10-CM

## 2020-04-27 PROCEDURE — 99213 OFFICE O/P EST LOW 20 MIN: CPT | Performed by: PSYCHIATRY & NEUROLOGY

## 2020-04-27 PROCEDURE — 1160F RVW MEDS BY RX/DR IN RCRD: CPT | Performed by: PSYCHIATRY & NEUROLOGY

## 2020-04-27 RX ORDER — ESCITALOPRAM OXALATE 5 MG/1
5 TABLET ORAL DAILY
Qty: 30 TABLET | Refills: 1 | Status: SHIPPED | OUTPATIENT
Start: 2020-04-27 | End: 2020-06-10

## 2020-04-27 RX ORDER — VENLAFAXINE HYDROCHLORIDE 75 MG/1
75 CAPSULE, EXTENDED RELEASE ORAL DAILY
Qty: 30 CAPSULE | Refills: 1 | Status: SHIPPED | OUTPATIENT
Start: 2020-04-27 | End: 2020-06-11 | Stop reason: DRUGHIGH

## 2020-04-27 RX ORDER — MIRTAZAPINE 15 MG/1
TABLET, FILM COATED ORAL
Qty: 30 TABLET | Refills: 1 | Status: SHIPPED | OUTPATIENT
Start: 2020-04-27 | End: 2020-06-10

## 2020-04-29 ENCOUNTER — TELEPHONE (OUTPATIENT)
Dept: FAMILY MEDICINE CLINIC | Facility: CLINIC | Age: 80
End: 2020-04-29

## 2020-05-01 ENCOUNTER — TELEMEDICINE (OUTPATIENT)
Dept: FAMILY MEDICINE CLINIC | Facility: CLINIC | Age: 80
End: 2020-05-01
Payer: MEDICARE

## 2020-05-01 DIAGNOSIS — R31.1 BENIGN ESSENTIAL MICROSCOPIC HEMATURIA: ICD-10-CM

## 2020-05-01 DIAGNOSIS — M79.7 FIBROMYALGIA: ICD-10-CM

## 2020-05-01 DIAGNOSIS — R35.0 URINARY FREQUENCY: Primary | ICD-10-CM

## 2020-05-01 DIAGNOSIS — F32.A DEPRESSION, UNSPECIFIED DEPRESSION TYPE: ICD-10-CM

## 2020-05-01 DIAGNOSIS — R30.0 DYSURIA: ICD-10-CM

## 2020-05-01 LAB
SL AMB  POCT GLUCOSE, UA: 50
SL AMB LEUKOCYTE ESTERASE,UA: ABNORMAL
SL AMB POCT BILIRUBIN,UA: 3
SL AMB POCT BLOOD,UA: 250
SL AMB POCT CLARITY,UA: CLEAR
SL AMB POCT COLOR,UA: YELLOW
SL AMB POCT KETONES,UA: ABNORMAL
SL AMB POCT NITRITE,UA: ABNORMAL
SL AMB POCT PH,UA: 5
SL AMB POCT SPECIFIC GRAVITY,UA: 1.01
SL AMB POCT URINE PROTEIN: 30
SL AMB POCT UROBILINOGEN: 4

## 2020-05-01 PROCEDURE — 81002 URINALYSIS NONAUTO W/O SCOPE: CPT | Performed by: FAMILY MEDICINE

## 2020-05-01 PROCEDURE — 99214 OFFICE O/P EST MOD 30 MIN: CPT | Performed by: FAMILY MEDICINE

## 2020-05-02 PROBLEM — R31.1 BENIGN ESSENTIAL MICROSCOPIC HEMATURIA: Status: ACTIVE | Noted: 2020-05-02

## 2020-05-02 LAB
BACTERIA UR CULT: NORMAL
Lab: NORMAL

## 2020-05-04 DIAGNOSIS — M79.7 FIBROMYALGIA: ICD-10-CM

## 2020-05-05 RX ORDER — HYDROCODONE BITARTRATE AND ACETAMINOPHEN 7.5; 3 MG/1; MG/1
TABLET ORAL
Qty: 30 TABLET | Refills: 0 | Status: SHIPPED | OUTPATIENT
Start: 2020-05-05 | End: 2020-05-18 | Stop reason: SDUPTHER

## 2020-05-11 ENCOUNTER — TELEPHONE (OUTPATIENT)
Dept: FAMILY MEDICINE CLINIC | Facility: CLINIC | Age: 80
End: 2020-05-11

## 2020-05-18 DIAGNOSIS — M79.7 FIBROMYALGIA: ICD-10-CM

## 2020-05-18 RX ORDER — HYDROCODONE BITARTRATE AND ACETAMINOPHEN 7.5; 3 MG/1; MG/1
TABLET ORAL
Qty: 30 TABLET | Refills: 0 | Status: SHIPPED | OUTPATIENT
Start: 2020-05-18 | End: 2020-06-01 | Stop reason: SDUPTHER

## 2020-05-26 ENCOUNTER — TELEPHONE (OUTPATIENT)
Dept: FAMILY MEDICINE CLINIC | Facility: CLINIC | Age: 80
End: 2020-05-26

## 2020-05-26 DIAGNOSIS — K21.9 GASTROESOPHAGEAL REFLUX DISEASE WITHOUT ESOPHAGITIS: ICD-10-CM

## 2020-05-26 DIAGNOSIS — F33.1 MODERATE EPISODE OF RECURRENT MAJOR DEPRESSIVE DISORDER (HCC): ICD-10-CM

## 2020-05-26 DIAGNOSIS — F41.1 GENERALIZED ANXIETY DISORDER: ICD-10-CM

## 2020-05-26 RX ORDER — ALPRAZOLAM 0.5 MG/1
0.5 TABLET ORAL 3 TIMES DAILY PRN
Qty: 90 TABLET | Refills: 0 | Status: SHIPPED | OUTPATIENT
Start: 2020-05-26 | End: 2020-06-11 | Stop reason: SDUPTHER

## 2020-05-26 RX ORDER — DEXLANSOPRAZOLE 60 MG/1
CAPSULE, DELAYED RELEASE ORAL
Qty: 30 CAPSULE | Refills: 5 | Status: SHIPPED | OUTPATIENT
Start: 2020-05-26 | End: 2020-06-29 | Stop reason: SDUPTHER

## 2020-05-26 RX ORDER — ESCITALOPRAM OXALATE 5 MG/1
5 TABLET ORAL DAILY
Qty: 30 TABLET | Refills: 1 | OUTPATIENT
Start: 2020-05-26

## 2020-06-01 DIAGNOSIS — M79.7 FIBROMYALGIA: ICD-10-CM

## 2020-06-01 DIAGNOSIS — E03.9 ACQUIRED HYPOTHYROIDISM: ICD-10-CM

## 2020-06-01 RX ORDER — LEVOTHYROXINE SODIUM 0.03 MG/1
25 TABLET ORAL DAILY
Qty: 90 TABLET | Refills: 3 | Status: SHIPPED | OUTPATIENT
Start: 2020-06-01

## 2020-06-01 RX ORDER — HYDROCODONE BITARTRATE AND ACETAMINOPHEN 7.5; 3 MG/1; MG/1
TABLET ORAL
Qty: 30 TABLET | Refills: 0 | Status: SHIPPED | OUTPATIENT
Start: 2020-06-01 | End: 2020-06-18 | Stop reason: SDUPTHER

## 2020-06-04 ENCOUNTER — CONSULT (OUTPATIENT)
Dept: SURGERY | Facility: CLINIC | Age: 80
End: 2020-06-04
Payer: MEDICARE

## 2020-06-04 VITALS
WEIGHT: 154 LBS | SYSTOLIC BLOOD PRESSURE: 132 MMHG | BODY MASS INDEX: 30.23 KG/M2 | HEART RATE: 81 BPM | TEMPERATURE: 98.7 F | DIASTOLIC BLOOD PRESSURE: 76 MMHG | HEIGHT: 60 IN

## 2020-06-04 DIAGNOSIS — R10.31 ABDOMINAL PAIN, CHRONIC, RIGHT LOWER QUADRANT: ICD-10-CM

## 2020-06-04 DIAGNOSIS — G89.4 CHRONIC PAIN SYNDROME: ICD-10-CM

## 2020-06-04 DIAGNOSIS — I10 ESSENTIAL HYPERTENSION: ICD-10-CM

## 2020-06-04 DIAGNOSIS — G89.29 ABDOMINAL PAIN, CHRONIC, RIGHT LOWER QUADRANT: ICD-10-CM

## 2020-06-04 DIAGNOSIS — E03.9 ACQUIRED HYPOTHYROIDISM: ICD-10-CM

## 2020-06-04 DIAGNOSIS — F41.9 ANXIETY: ICD-10-CM

## 2020-06-04 DIAGNOSIS — I89.0 LYMPHEDEMA OF BOTH LOWER EXTREMITIES: ICD-10-CM

## 2020-06-04 DIAGNOSIS — K59.04 CHRONIC IDIOPATHIC CONSTIPATION: Primary | ICD-10-CM

## 2020-06-04 PROCEDURE — 3075F SYST BP GE 130 - 139MM HG: CPT | Performed by: SPECIALIST

## 2020-06-04 PROCEDURE — 3078F DIAST BP <80 MM HG: CPT | Performed by: SPECIALIST

## 2020-06-04 PROCEDURE — 99214 OFFICE O/P EST MOD 30 MIN: CPT | Performed by: SPECIALIST

## 2020-06-04 PROCEDURE — 1036F TOBACCO NON-USER: CPT | Performed by: SPECIALIST

## 2020-06-04 PROCEDURE — 4040F PNEUMOC VAC/ADMIN/RCVD: CPT | Performed by: SPECIALIST

## 2020-06-04 PROCEDURE — 3008F BODY MASS INDEX DOCD: CPT | Performed by: SPECIALIST

## 2020-06-04 PROCEDURE — 1160F RVW MEDS BY RX/DR IN RCRD: CPT | Performed by: SPECIALIST

## 2020-06-08 ENCOUNTER — TELEPHONE (OUTPATIENT)
Dept: FAMILY MEDICINE CLINIC | Facility: CLINIC | Age: 80
End: 2020-06-08

## 2020-06-10 ENCOUNTER — TELEMEDICINE (OUTPATIENT)
Dept: FAMILY MEDICINE CLINIC | Facility: CLINIC | Age: 80
End: 2020-06-10
Payer: MEDICARE

## 2020-06-10 DIAGNOSIS — G89.4 CHRONIC PAIN SYNDROME: ICD-10-CM

## 2020-06-10 DIAGNOSIS — G47.19 DAYTIME HYPERSOMNOLENCE: ICD-10-CM

## 2020-06-10 DIAGNOSIS — M79.7 FIBROMYALGIA: ICD-10-CM

## 2020-06-10 DIAGNOSIS — F33.1 MODERATE EPISODE OF RECURRENT MAJOR DEPRESSIVE DISORDER (HCC): Primary | ICD-10-CM

## 2020-06-10 PROCEDURE — 99443 PR PHYS/QHP TELEPHONE EVALUATION 21-30 MIN: CPT | Performed by: FAMILY MEDICINE

## 2020-06-11 ENCOUNTER — OFFICE VISIT (OUTPATIENT)
Dept: BEHAVIORAL/MENTAL HEALTH CLINIC | Facility: CLINIC | Age: 80
End: 2020-06-11
Payer: MEDICARE

## 2020-06-11 DIAGNOSIS — F41.1 GENERALIZED ANXIETY DISORDER: ICD-10-CM

## 2020-06-11 DIAGNOSIS — F33.1 MODERATE EPISODE OF RECURRENT MAJOR DEPRESSIVE DISORDER (HCC): Primary | ICD-10-CM

## 2020-06-11 PROCEDURE — 4040F PNEUMOC VAC/ADMIN/RCVD: CPT | Performed by: PSYCHIATRY & NEUROLOGY

## 2020-06-11 PROCEDURE — 3075F SYST BP GE 130 - 139MM HG: CPT | Performed by: PSYCHIATRY & NEUROLOGY

## 2020-06-11 PROCEDURE — 99213 OFFICE O/P EST LOW 20 MIN: CPT | Performed by: PSYCHIATRY & NEUROLOGY

## 2020-06-11 PROCEDURE — 3078F DIAST BP <80 MM HG: CPT | Performed by: PSYCHIATRY & NEUROLOGY

## 2020-06-11 PROCEDURE — 1036F TOBACCO NON-USER: CPT | Performed by: PSYCHIATRY & NEUROLOGY

## 2020-06-11 RX ORDER — ALPRAZOLAM 0.5 MG/1
0.5 TABLET ORAL 3 TIMES DAILY PRN
Qty: 90 TABLET | Refills: 1 | Status: SHIPPED | OUTPATIENT
Start: 2020-06-11 | End: 2020-07-13 | Stop reason: SDUPTHER

## 2020-06-11 RX ORDER — BUSPIRONE HYDROCHLORIDE 10 MG/1
TABLET ORAL
Qty: 60 TABLET | Refills: 1 | Status: SHIPPED | OUTPATIENT
Start: 2020-06-11 | End: 2020-07-13 | Stop reason: SINTOL

## 2020-06-11 RX ORDER — VENLAFAXINE HYDROCHLORIDE 150 MG/1
150 CAPSULE, EXTENDED RELEASE ORAL DAILY
Qty: 30 CAPSULE | Refills: 1 | Status: SHIPPED | OUTPATIENT
Start: 2020-06-11 | End: 2020-07-13 | Stop reason: SDUPTHER

## 2020-06-18 DIAGNOSIS — M79.7 FIBROMYALGIA: ICD-10-CM

## 2020-06-18 RX ORDER — HYDROCODONE BITARTRATE AND ACETAMINOPHEN 7.5; 3 MG/1; MG/1
TABLET ORAL
Qty: 30 TABLET | Refills: 0 | Status: SHIPPED | OUTPATIENT
Start: 2020-06-18 | End: 2020-07-06 | Stop reason: SDUPTHER

## 2020-06-29 DIAGNOSIS — K21.9 GASTROESOPHAGEAL REFLUX DISEASE WITHOUT ESOPHAGITIS: ICD-10-CM

## 2020-06-29 RX ORDER — DEXLANSOPRAZOLE 60 MG/1
CAPSULE, DELAYED RELEASE ORAL
Qty: 30 CAPSULE | Refills: 5 | Status: SHIPPED | OUTPATIENT
Start: 2020-06-29

## 2020-07-06 DIAGNOSIS — M79.7 FIBROMYALGIA: ICD-10-CM

## 2020-07-06 RX ORDER — HYDROCODONE BITARTRATE AND ACETAMINOPHEN 7.5; 3 MG/1; MG/1
TABLET ORAL
Qty: 30 TABLET | Refills: 0 | Status: SHIPPED | OUTPATIENT
Start: 2020-07-06 | End: 2020-07-23 | Stop reason: SDUPTHER

## 2020-07-13 ENCOUNTER — OFFICE VISIT (OUTPATIENT)
Dept: BEHAVIORAL/MENTAL HEALTH CLINIC | Facility: CLINIC | Age: 80
End: 2020-07-13
Payer: MEDICARE

## 2020-07-13 ENCOUNTER — TELEPHONE (OUTPATIENT)
Dept: FAMILY MEDICINE CLINIC | Facility: CLINIC | Age: 80
End: 2020-07-13

## 2020-07-13 DIAGNOSIS — F33.1 MODERATE EPISODE OF RECURRENT MAJOR DEPRESSIVE DISORDER (HCC): Primary | ICD-10-CM

## 2020-07-13 DIAGNOSIS — F41.1 GENERALIZED ANXIETY DISORDER: ICD-10-CM

## 2020-07-13 PROCEDURE — 4040F PNEUMOC VAC/ADMIN/RCVD: CPT | Performed by: PSYCHIATRY & NEUROLOGY

## 2020-07-13 PROCEDURE — 99214 OFFICE O/P EST MOD 30 MIN: CPT | Performed by: PSYCHIATRY & NEUROLOGY

## 2020-07-13 PROCEDURE — 3075F SYST BP GE 130 - 139MM HG: CPT | Performed by: PSYCHIATRY & NEUROLOGY

## 2020-07-13 PROCEDURE — 1036F TOBACCO NON-USER: CPT | Performed by: PSYCHIATRY & NEUROLOGY

## 2020-07-13 PROCEDURE — 3078F DIAST BP <80 MM HG: CPT | Performed by: PSYCHIATRY & NEUROLOGY

## 2020-07-13 RX ORDER — VENLAFAXINE 75 MG/1
75 TABLET ORAL 2 TIMES DAILY
Qty: 60 TABLET | Refills: 2 | Status: SHIPPED | OUTPATIENT
Start: 2020-07-13

## 2020-07-13 RX ORDER — ALPRAZOLAM 0.5 MG/1
0.5 TABLET ORAL 3 TIMES DAILY PRN
Qty: 90 TABLET | Refills: 2 | Status: SHIPPED | OUTPATIENT
Start: 2020-07-13

## 2020-07-13 RX ORDER — VENLAFAXINE HYDROCHLORIDE 150 MG/1
150 CAPSULE, EXTENDED RELEASE ORAL DAILY
Qty: 30 CAPSULE | Refills: 2 | Status: SHIPPED | OUTPATIENT
Start: 2020-07-13 | End: 2020-07-13 | Stop reason: ALTCHOICE

## 2020-07-13 NOTE — TELEPHONE ENCOUNTER
[facility] called , I spoke w Holly Helio     Juarez;ar effexor tabs are not regularly used he stated it should be changed to the XR if you could resend

## 2020-07-13 NOTE — PSYCH
Subjective:     Patient ID: Isabel Medrano is a [de-identified] y o  female  HPI:   Patient seen for follow up, she is currently on Effexor  mg daily, Xanax 0 5 mg 2 to 3 times per day  She has been unable to tolerate Buspar and stopped this after 1 week, due to having more depression  She typically is somewhat depressed on mostly a daily basis and often related to pain and impaired mobility  She does endorse hopelessness, but not having any thoughts of death  She sleeps poorly during the night, often waking up for ano hour and all told sleeps about 5 hours  She has tried several medications for sleep such as Remeron, Trazodone, Doxepin  She sleeps about 4 or hrs during the day  She feels comfortable on her current medications  ROS Appetite Changes and Sleep: normal appetite, decreased energy and decrease in number of sleep hours see above    Review Of Systems:     Mood Anxiety and Depression   Behavior Normal    Thought Content Normal   General Normal    Personality Normal   Other Psych Symptoms Normal   Constitutional Normal   ENT Normal   Cardiovascular Normal    Respiratory Normal    Gastrointestinal Normal   Genitourinary Normal    Musculoskeletal Negative   Integumentary Normal    Neurological Normal    Endocrine Normal    Other Symptoms Normal              Laboratory Results: No results found for this or any previous visit      Substance Abuse History:   Social History     Substance and Sexual Activity   Drug Use No       Family Psychiatric History:   Family History   Problem Relation Age of Onset    Heart disease Mother     Diverticulosis Mother     Emphysema Father     Cancer Sister         lung and breast, carcinoma of the pancreas    Heart disease Sister     Parkinsonism Brother     Lung cancer Brother         late 42's       The following portions of the patient's history were reviewed and updated as appropriate: allergies, current medications, past family history, past medical history, past social history, past surgical history and problem list     Social History     Socioeconomic History    Marital status: Single     Spouse name: Not on file    Number of children: Not on file    Years of education: Not on file    Highest education level: Not on file   Occupational History    Occupation: Retired   Social Needs    Financial resource strain: Not on file    Food insecurity:     Worry: Not on file     Inability: Not on file   [employer] needs:     Medical: Not on file     Non-medical: Not on file   Tobacco Use    Smoking status: Never Smoker    Smokeless tobacco: Never Used   Substance and Sexual Activity    Alcohol use: Yes     Comment: Social    Drug use: No    Sexual activity: Not on file   Lifestyle    Physical activity:     Days per week: Not on file     Minutes per session: Not on file    Stress: Not on file   Relationships    Social connections:     Talks on phone: Not on file     Gets together: Not on file     Attends [employer] service: Not on file     Active member of club or organization: Not on file     Attends meetings of clubs or organizations: Not on file     Relationship status: Not on file    Intimate partner violence:     Fear of current or ex partner: Not on file     Emotionally abused: Not on file     Physically abused: Not on file     Forced sexual activity: Not on file   Other Topics Concern    Not on file   Social History Narrative    Advance directive on file    [employer] states both  and Single     Social History     Social History Narrative    Advance directive on file    [employer] states both  and Single         Objective:       Mental status:  Appearance calm and cooperative  and adequate hygiene and grooming   Mood depressed   Affect affect was blunted   Speech a normal rate and speech soft   Thought Processes normal thought processes   Hallucinations no hallucinations present    Thought Content no delusions   Abnormal Thoughts no suicidal thoughts  and no homicidal thoughts    Orientation  oriented to person and place and time   Remote Memory short term memory intact and long term memory intact   Attention Span concentration intact   Intellect Appears to be of Average Intelligence   Insight Insight intact   Judgement judgment was intact   Muscle Strength Muscle strength and tone were normal and Normal gait    Language no difficulty naming common objects, no difficulty repeating a phrase  and no difficulty writing a sentence    Fund of Knowledge displays adequate knowledge of current events, adequate fund of knowledge regarding past history and adequate fund of knowledge regarding vocabulary    Pain none   Pain Scale 0       Assessment/Plan:       Diagnoses and all orders for this visit:    Moderate episode of recurrent major depressive disorder ([facility] [address] )  -     Discontinue: venlafaxine (EFFEXOR-XR) 150 mg 24 hr capsule; Take 1 capsule (150 mg total) by mouth daily  -     venlafaxine (EFFEXOR) 75 mg tablet; Take 1 tablet (75 mg total) by mouth 2 (two) times a day    Generalized anxiety disorder  -     ALPRAZolam (XANAX) 0 5 mg tablet; Take 1 tablet (0 5 mg total) by mouth 3 (three) times a day as needed for anxiety  -     Discontinue: venlafaxine (EFFEXOR-XR) 150 mg 24 hr capsule; Take 1 capsule (150 mg total) by mouth daily            Treatment Recommendations- Risks Benefits    Changing to tablets to avoid dye, itching related to that  Immediate Medical/Psychiatric/Psychotherapy Treatments and Any Precautions: continue with current meds, discussed exercising in wheelchair to help pain, mood  Risks, Benefits And Possible Side Effects Of Medications:  dicussed    Controlled Medication Discussion: Discussed with patient Black Box warning on concurrent use of benzodiazepines and opioid medications including sedation, respiratory depression, coma and death   Patient understands the risk of treatment with benzodiazepines in addition to opioids and wants to continue taking those medications  and Discussed with patient the risks of sedation, respiratory depression, impairment of ability to drive and potential for abuse and addiction related to treatment with benzodiazepine medications  The patient understands risk of treatment with benzodiazepine medications, agrees to not drive if feels impaired and agrees to take medications as prescribed

## 2020-07-14 NOTE — TELEPHONE ENCOUNTER
Called and left message at pharmacy that his was intentional order to see if she has been having an allergic reaction, itching from the capsule

## 2020-07-23 DIAGNOSIS — M79.7 FIBROMYALGIA: ICD-10-CM

## 2020-07-23 RX ORDER — HYDROCODONE BITARTRATE AND ACETAMINOPHEN 7.5; 3 MG/1; MG/1
TABLET ORAL
Qty: 30 TABLET | Refills: 0 | Status: SHIPPED | OUTPATIENT
Start: 2020-07-23

## 2020-07-27 DIAGNOSIS — J30.1 SEASONAL ALLERGIC RHINITIS DUE TO POLLEN: Primary | ICD-10-CM

## 2020-07-27 DIAGNOSIS — R11.0 NAUSEA: ICD-10-CM

## 2020-07-27 RX ORDER — FEXOFENADINE HCL 180 MG/1
180 TABLET ORAL DAILY
Qty: 30 TABLET | Refills: 5 | Status: SHIPPED | OUTPATIENT
Start: 2020-07-27

## 2020-07-27 RX ORDER — ONDANSETRON 4 MG/1
4 TABLET, FILM COATED ORAL EVERY 8 HOURS PRN
Qty: 10 TABLET | Refills: 0 | Status: SHIPPED | OUTPATIENT
Start: 2020-07-27

## 2020-08-02 ENCOUNTER — TELEPHONE (OUTPATIENT)
Dept: OTHER | Facility: OTHER | Age: 80
End: 2020-08-02

## 2020-08-02 NOTE — TELEPHONE ENCOUNTER
[facility] from [facility] medical examiner's office called, Pt was pronounced yesterday at 822 amat home, notching suspicious - per Medical examiner

## 2020-08-03 ENCOUNTER — TELEPHONE (OUTPATIENT)
Dept: FAMILY MEDICINE CLINIC | Facility: CLINIC | Age: 80
End: 2020-08-03

## 2020-08-03 ENCOUNTER — TELEPHONE (OUTPATIENT)
Dept: OTHER | Facility: OTHER | Age: 80
End: 2020-08-03

## 2020-08-03 NOTE — TELEPHONE ENCOUNTER
Cirilo Harry from [facility] is calling to report that Patient Passed away in her home on 8/1/2020 at 8:23 am  Per Paramedics   Death certificate needs to signed    Case Number [id number]

## 2020-08-03 NOTE — TELEPHONE ENCOUNTER
[facility] is calling again regarding this patient passing, message was sent to office last night  Please call back as soon as possible regarding death certificate

## 2021-09-10 NOTE — TELEPHONE ENCOUNTER
S/w Pt C/o sinus pain and pressure, Pt would like a call back has some other questions  Offered appt w/different provider and pt refused  Pt will be on a meeting call from 7:30 pm until 9:15  cooperative then irritable and storms away Abnormal gait,  uses walker responding to internal stimuli and internally preoccupied

## (undated) DEVICE — TRAP POLY

## (undated) DEVICE — SINGLE-USE POLYPECTOMY SNARE: Brand: SENSATION SHORT THROW

## (undated) DEVICE — SINGLE-USE BIOPSY FORCEPS: Brand: RADIAL JAW 4